# Patient Record
Sex: FEMALE | Race: BLACK OR AFRICAN AMERICAN | NOT HISPANIC OR LATINO
[De-identification: names, ages, dates, MRNs, and addresses within clinical notes are randomized per-mention and may not be internally consistent; named-entity substitution may affect disease eponyms.]

---

## 2020-03-23 ENCOUNTER — APPOINTMENT (OUTPATIENT)
Dept: COLORECTAL SURGERY | Facility: CLINIC | Age: 30
End: 2020-03-23
Payer: COMMERCIAL

## 2020-03-23 VITALS
BODY MASS INDEX: 20.88 KG/M2 | WEIGHT: 133 LBS | HEART RATE: 91 BPM | DIASTOLIC BLOOD PRESSURE: 69 MMHG | TEMPERATURE: 97.7 F | SYSTOLIC BLOOD PRESSURE: 112 MMHG | HEIGHT: 67 IN

## 2020-03-23 PROCEDURE — 99203 OFFICE O/P NEW LOW 30 MIN: CPT

## 2020-03-23 NOTE — PHYSICAL EXAM
[Abdomen Masses] : No abdominal masses [Abdomen Tenderness] : ~T No ~M abdominal tenderness [No HSM] : no hepatosplenomegaly [None] : no anal fissures seen [Tight] : was tight [de-identified] : Superficial one CM ulceration of the posterior margin [de-identified] : Moderate diffuse tenderness. Significant stricture at 6  cm. Patient unable to tolerate further examination

## 2020-03-23 NOTE — ASSESSMENT
[FreeTextEntry1] : I reviewed with the patient with the findings on examination and imaging are consistent with Crohn's disease. I recommend that she proceed and returned to her gastroenterologist for medical therapy. Pending assessment of response to medical therapy further treatment options would include balloon dilatation of the rectal stricture and possible ileal stricture. However the role of further surgical intervention with detail and outlined including but limited to risk benefits and alternatives.

## 2020-03-30 ENCOUNTER — APPOINTMENT (OUTPATIENT)
Dept: COLORECTAL SURGERY | Facility: CLINIC | Age: 30
End: 2020-03-30

## 2021-01-29 ENCOUNTER — APPOINTMENT (OUTPATIENT)
Dept: COLORECTAL SURGERY | Facility: CLINIC | Age: 31
End: 2021-01-29
Payer: COMMERCIAL

## 2021-01-29 VITALS
BODY MASS INDEX: 24.8 KG/M2 | DIASTOLIC BLOOD PRESSURE: 76 MMHG | HEIGHT: 67 IN | TEMPERATURE: 97.4 F | WEIGHT: 158 LBS | HEART RATE: 97 BPM | SYSTOLIC BLOOD PRESSURE: 129 MMHG

## 2021-01-29 DIAGNOSIS — Z82.49 FAMILY HISTORY OF ISCHEMIC HEART DISEASE AND OTHER DISEASES OF THE CIRCULATORY SYSTEM: ICD-10-CM

## 2021-01-29 DIAGNOSIS — Z80.3 FAMILY HISTORY OF MALIGNANT NEOPLASM OF BREAST: ICD-10-CM

## 2021-01-29 DIAGNOSIS — Z83.3 FAMILY HISTORY OF DIABETES MELLITUS: ICD-10-CM

## 2021-01-29 PROCEDURE — 99072 ADDL SUPL MATRL&STAF TM PHE: CPT

## 2021-01-29 PROCEDURE — 99214 OFFICE O/P EST MOD 30 MIN: CPT

## 2021-01-29 NOTE — HISTORY OF PRESENT ILLNESS
[FreeTextEntry1] : 30 y/o F presents for f/u Crohn's disease\par Last seen in the office on 3/23/20 after initial diagnosis. Patient was advised to return to GI and begin medical therapy \par She is currently being followed by Dr. Tonia Joshua and is on Remicaide Q4 weeks\par \par Reports difficulty evacuating her bowls which she discussed with Dr. Prieto. On exam, she was informed her muscles were very tight and diagnosed with possible anal fissure. Never prescribed topical compounds, advised to seek consultation with our office for anal dilatation\par Denies pain, itching, bleeding. Has noticed in the past blood in the bowl when only passing flatus\par BH: Daily\par Admits to straining with BM's and taking up to 5 minutes to evacuate\par Instructed by GI to take Miralax daily. Has been using for 1 month without improvement\par Working on dietary fiber intake, allergic to a number of fruits which limits options. Drinking 4-5 16 oz . bottles of water daily \par \par Colonoscopy completed 2/24/20. To be scheduled for repeat colonoscopy once evaluation of current symptoms completed\par \par MRI Enterography completed within the last 2 months at [facility] in [place]\par \par No ASA/NSAIDs last 7 days

## 2021-01-29 NOTE — ASSESSMENT
[FreeTextEntry1] : I reviewed with the patient that it appears that she has left-sided inflammation on a recent MRI and on examination the suspicion of rectal inflammation and secondary hypertonicity. I have suggested that we trialed a course of 5 ASA suppositories therapy and if symptoms persist we'll plan for exam under anesthesia with anal dilation. At this time I would like to address the acute inflammatory aspect of her chronic Crohn disease prior to attempted dilation in the hopes of achieving a long-term positive outcome.\par \par follow up 4 weeks.\par

## 2021-01-29 NOTE — PHYSICAL EXAM
[Abdomen Masses] : No abdominal masses [Abdomen Tenderness] : ~T No ~M abdominal tenderness [No HSM] : no hepatosplenomegaly [None] : no anal fissures seen [Fistula] : a fistula [1:00] : in the 1:00 position [Tight] : was tight [de-identified] : Moderate diffuse tenderness. able to pass finger on rectal examination- increased tone secondary to pain

## 2021-02-26 ENCOUNTER — APPOINTMENT (OUTPATIENT)
Dept: COLORECTAL SURGERY | Facility: CLINIC | Age: 31
End: 2021-02-26

## 2021-08-19 NOTE — HISTORY OF PRESENT ILLNESS
[FreeTextEntry1] : 30 y/o F presents for f/u Crohn's disease\par She is currently being followed by Dr. Tonia Joshua and is on Remicaide Q4 weeks\par \par Last seen in the office on 1/29/21 with c/o anal tightness and possible fissure. A quiescent closed fistula in the right anterior quadrant at 1 o'clock noted as well as a tight sphincter tone. Patient also noted to have moderate diffuse tenderness and increased tone secondary to pain. She was advised to complete a trial of  Mesalamine suppositories\par \par Colonoscopy completed 2/24/20. To be scheduled for repeat colonoscopy once evaluation of current symptoms completed\par \par MR enterography completed 1/4/21 at [facility]\par - compared to 3/3/20, the inflammatory change of the distal ileum shows improvement. Previously appreciated long ileal segment mild inflammatory change is no longer appreciated. However, there is newly appreciated descending colon inflammatory change\par - no evidence of bowel obstruction, proximal small bowel dilatation, fistula or abscess\par - IUD appears to be upside down in position within the uterine cavity

## 2021-08-20 ENCOUNTER — APPOINTMENT (OUTPATIENT)
Dept: COLORECTAL SURGERY | Facility: CLINIC | Age: 31
End: 2021-08-20

## 2021-10-01 ENCOUNTER — APPOINTMENT (OUTPATIENT)
Dept: COLORECTAL SURGERY | Facility: CLINIC | Age: 31
End: 2021-10-01

## 2021-10-25 ENCOUNTER — APPOINTMENT (OUTPATIENT)
Dept: COLORECTAL SURGERY | Facility: CLINIC | Age: 31
End: 2021-10-25

## 2021-11-12 ENCOUNTER — APPOINTMENT (OUTPATIENT)
Dept: COLORECTAL SURGERY | Facility: CLINIC | Age: 31
End: 2021-11-12
Payer: COMMERCIAL

## 2021-11-12 VITALS
WEIGHT: 159 LBS | HEIGHT: 67 IN | BODY MASS INDEX: 24.96 KG/M2 | HEART RATE: 98 BPM | DIASTOLIC BLOOD PRESSURE: 76 MMHG | SYSTOLIC BLOOD PRESSURE: 111 MMHG | TEMPERATURE: 98.2 F

## 2021-11-12 PROCEDURE — 99215 OFFICE O/P EST HI 40 MIN: CPT

## 2021-11-12 NOTE — ASSESSMENT
[FreeTextEntry1] : I reviewed with the patient her findings on examination are consistent with complex Crohn's disease.  Specifically she has complex perianal disease with perianal skin excoriations edematous tags, and a anal fistula with stricturing of the distal rectum.  In regard to these findings there is no benefit of surgical intervention at this time.  I suggested we proceed with an MRI for further evaluation.\par \par Pending review of the MRI further evaluation including colonoscopy for surveillance of her inflammatory bowel disease would be appropriate.\par \par I have outlined to her that surgical intervention for Crohn's disease would be utilized in settings of complications–abscess/obstruction/stricture not amenable to medical treatment.\par \par Nutritional consultation provided.  Appropriate referrals given.

## 2021-11-12 NOTE — PHYSICAL EXAM
[Abdomen Masses] : No abdominal masses [Abdomen Tenderness] : ~T No ~M abdominal tenderness [No HSM] : no hepatosplenomegaly [None] : no anal fissures seen [Excoriation] : excoriations [Fistula] : a fistula [5:00] : in the 5:00 position [Tight] : was tight [de-identified] : Superficial skin erosions/excoriations extending along the posterior anal margin of the inter  cleft and perianal skin.  Edematous right posterior base external hemorrhoidal tag with small anterior tags [de-identified] : able to pass fifth finger on rectal examination-

## 2021-11-12 NOTE — HISTORY OF PRESENT ILLNESS
[FreeTextEntry1] : 30 y/o F presents Crohn's disease\par She is currently being followed by Dr. Tonia Joshua and is on Remicaide Q4 weeks\par \par Last seen in the office on 1/29/21.  A quiescent, closed right anterior anal fistula opening at right anterior anal fistula opening in the 1 o'clock position. Sphincter tone was tight with moderate diffuse tenderness. Anoscopy deferred due to pain. Advised to begin trial of Mesalamine suppositories. Never started because pharmacy did not have in stock. Spoke to Dr. Prieto who advised patient begin using Metamucil instead\par \par Since last visit had a flare of symptoms when infusion was delayed one week. Back on track with infusions\par C/o persistent intermittent abdominal pains \par Reports small bumps near the anus that she believes may be a skin tag vs a cyst\par Denies anorectal pain or drainage\par Reports occasional BRBPR on the TP \par Denies fever, chills\par C/o occasional nausea and vomiting, last episode occurred last week. States this typically occurs within several days of receiving infusion. Last infusion complete several days prior \par Appetite is adequate, eating several smaller meals daily \par Does not do sitz baths\par BH: 2-3 times daily. Stools are typically formed with decreased caliber, at times can be mushy. Depending on diet can occasionally experience watery diarrhea \par C/o FU with  partial FI that occurs on average every 2 weeks. Typically triggered by diet. Wearing panty liner as a precaution \par Denies mucous with BM's\par Using Metamucil daily \par \par Colonoscopy completed 2/24/20\par - JOSE remarkable for perianal skin tags\par - firm circumferential nodularity in the anus causing stricture. Could NOT be traversed with pediatric colonoscopy, finally traversed with adult gastroscope. Reached to the ascending colon but likely visualizing ICV\par - large ulcer seen at farthest extent reached\par \par To be scheduled for repeat colonoscopy, patient advised to speak to our office in regards when she can proceed 2/2 stricturing.\par Reports blood work shows no change in inflammatory markers \par \par MRI Enterography completed 1/4/21\par - compared to 3/3/20, the inflammatory change of the distal ileum shows improvement. Previously appreciated long ileal segment mild inflammatory change is no longer appreciated. However, there is newly appreciated descending colon inflammatory change\par - no evidence of bowel obstruction, proximal small bowel dilatation, fistula or abscess\par - IUD appear to be upside down in position within the uterine cavity\par \par No ASA/NSAIDs last 7 days

## 2021-12-02 ENCOUNTER — APPOINTMENT (OUTPATIENT)
Dept: MRI IMAGING | Facility: CLINIC | Age: 31
End: 2021-12-02

## 2021-12-22 ENCOUNTER — APPOINTMENT (OUTPATIENT)
Dept: GASTROENTEROLOGY | Facility: CLINIC | Age: 31
End: 2021-12-22

## 2021-12-29 ENCOUNTER — TRANSCRIPTION ENCOUNTER (OUTPATIENT)
Age: 31
End: 2021-12-29

## 2022-01-18 ENCOUNTER — NON-APPOINTMENT (OUTPATIENT)
Age: 32
End: 2022-01-18

## 2022-02-17 ENCOUNTER — APPOINTMENT (OUTPATIENT)
Dept: GASTROENTEROLOGY | Facility: CLINIC | Age: 32
End: 2022-02-17
Payer: COMMERCIAL

## 2022-02-17 VITALS
SYSTOLIC BLOOD PRESSURE: 109 MMHG | WEIGHT: 160 LBS | DIASTOLIC BLOOD PRESSURE: 73 MMHG | BODY MASS INDEX: 25.11 KG/M2 | RESPIRATION RATE: 16 BRPM | HEART RATE: 106 BPM | TEMPERATURE: 96.6 F | HEIGHT: 67 IN | OXYGEN SATURATION: 99 %

## 2022-02-17 PROCEDURE — 99205 OFFICE O/P NEW HI 60 MIN: CPT

## 2022-02-18 NOTE — CONSULT LETTER
[Dear  ___] : Dear  [unfilled], [Consult Letter:] : I had the pleasure of evaluating your patient, [unfilled]. [Please see my note below.] : Please see my note below. [Consult Closing:] : Thank you very much for allowing me to participate in the care of this patient.  If you have any questions, please do not hesitate to contact me. [Sincerely,] : Sincerely, [FreeTextEntry3] : Raciel Wu MD\par Associate Professor of Medicine\par Chief of GI\par Director IBD Program\par [facility]\par

## 2022-02-18 NOTE — ASSESSMENT
[FreeTextEntry1] :  31 y/o F patient with history of Stricturing Crohn disease patient of Dr. Tonia Prieto, currently on Inflectra q4 weeks, sent as referral for evaluation and treatment of IC valve stricture.  Based on her radiologic follow-up it is clear that the Inflectra has been effective and has improved the distal colonic disease but the stricture at the ileum remains unaffected and will likely need mechanical or surgical therapy.\par \par #Stricturing Crohn Disease \par - Although not overtly symptomatic currently she does have intermittent abdominal pain and is food avoidant.\par - Case discussed with multidisciplinary IBD conference- Continue with medical management for now. Can try dilation of anal stenosis, but surgical evaluation suggested easy passage of digit and no evidence of anal stenosis as a cause of outlet obstruction (also agreed based on imaging). Bowel regimen to help with constipation. Group agreed that endoscopic dilation of IVC would be beneficial.  We reviewed the images with the patient.\par - Planned for Plan for Colonoscopy with dilation (with fluoroscopy), use golytely for prep \par - Risks/benefits and alternatives of procedure discussed with the patient and patient agrees to proceed with the procedure. \par - Continue Inflectra q4 weeks \par - Routine pre-procedure labs today \par \par \par RTC after procedure \par \par \par Time spent on preparation of visit immediately before the encounter patient visit and immediate post visit care was 80 minutes activities pertaining to this visit were: Obtaining and/or reviewing separately obtained history performing a medically appropriate examination and/or evaluation as documented in this encounter note, counseling, and education of the patient, family, or caregiver, ordering medications, tests, procedures, referring and communicating with healthcare professionals, documenting clinical information the patient's electronic medical record, interpreting results not separately reported and communicating results to the patient, family, or caregiver and care coordination.As a new patient, additional time was required to  re: options for meds and communicate with referring physician.\par \par

## 2022-02-18 NOTE — PHYSICAL EXAM
[General Appearance - Alert] : alert [General Appearance - In No Acute Distress] : in no acute distress [] : no respiratory distress [Abdomen Soft] : soft [Oriented To Time, Place, And Person] : oriented to person, place, and time [Sclera] : the sclera and conjunctiva were normal [Outer Ear] : the ears and nose were normal in appearance [Neck Appearance] : the appearance of the neck was normal [FreeTextEntry1] : tender in the lower abdomen with an ileal loop felt that had peristalsis with deeper palpation  [Abnormal Walk] : normal gait [Skin Color & Pigmentation] : normal skin color and pigmentation

## 2022-02-18 NOTE — HISTORY OF PRESENT ILLNESS
[Heartburn] : denies heartburn [Vomiting] : denies vomiting [Nausea] : denies nausea [Diarrhea] : denies diarrhea [Yellow Skin Or Eyes (Jaundice)] : denies jaundice [Constipation] : denies constipation [Abdominal Swelling] : denies abdominal swelling [Inflammatory Bowel Disease] : inflammatory bowel disease [Abdominal Pain] : abdominal pain [de-identified] : intermittent rectal bleeding  [FreeTextEntry1] :  33 y/o F patient with history of Stricturing Crohn Diesese, patient of Dr. Tonia Joshua, currently on Inflectra q4 weeks, sent as referral for evaluation and treatment of IC valve stricture. \par \par Crohn Disease \par Patient was diagnosed in 2019, after she had loss of weight accomplainied with intolerance of PO intake, vomiting and abdominal pain. She underwent a colonoscopy and was found to have anal stenosis and IC valve stricutre with ulceration. Initially started on Remicaide q8 weeks, not much symptom improvement and later changed to q4 weeks (Remicaide changed to biosimilar)\par \par Currently she complains of intermittent lower abdominal pain, she has changed her diet over the past year and avoids foods which cause her symptoms. Notices some blood in stools. No nausea, vomiting reported. \par BM 2-3 times/day, consistency varies with diet. \par \par Colonoscopy completed 2/24/20\par - JOSE remarkable for perianal skin tags\par - firm circumferential nodularity in the anus causing stricture. Could NOT be traversed with pediatric colonoscopy, finally traversed with adult gastroscope. Reached to the ascending colon but likely visualizing ICV\par - large ulcer seen at farthest extent reached\par \par MRI 12/2021\par - Stable 3 cm segment terminal ileum with moderate active inflammatory disease including prominent mural hyperenhancement and restricted diffusion. The segment again demonstrated probable stricture (intraluminal narrowing without upstream dilation). \par - Long segment distal colon with ch previously demonstrated active inflammatory disease now appears significantly improved with a 4 cm residual segment demonstrating persistent mild active inflammatory disease \par - NO evidence of penetrating or perianal disease\par - NO extraintestinal sequela of inflammatory bowel disease \par \par \par Case discussed with multidisciplinary IBD conference- \par Continue with medical management for now. Can try dilation of anal stenosis, but surgical evaluation suggested easy passage of digit and no evidence of anal stenosis as a cause of outlet obstruction (also agreed based on imaging). Bowel regimen to help with constipation. Group agreed that endoscopic dilation of IVC would be beneficial. \par \par

## 2022-02-22 LAB
ALBUMIN SERPL ELPH-MCNC: 4 G/DL
ALP BLD-CCNC: 97 U/L
ALT SERPL-CCNC: 15 U/L
ANION GAP SERPL CALC-SCNC: 11 MMOL/L
AST SERPL-CCNC: 16 U/L
BASOPHILS # BLD AUTO: 0.05 K/UL
BASOPHILS NFR BLD AUTO: 0.5 %
BILIRUB SERPL-MCNC: 0.3 MG/DL
BUN SERPL-MCNC: 12 MG/DL
CALCIUM SERPL-MCNC: 9.2 MG/DL
CHLORIDE SERPL-SCNC: 103 MMOL/L
CO2 SERPL-SCNC: 22 MMOL/L
CREAT SERPL-MCNC: 0.88 MG/DL
CRP SERPL-MCNC: 11 MG/L
EOSINOPHIL # BLD AUTO: 0.23 K/UL
EOSINOPHIL NFR BLD AUTO: 2.5 %
GLUCOSE SERPL-MCNC: 108 MG/DL
HCT VFR BLD CALC: 39.2 %
HGB BLD-MCNC: 12.3 G/DL
IMM GRANULOCYTES NFR BLD AUTO: 0.3 %
LYMPHOCYTES # BLD AUTO: 3.6 K/UL
LYMPHOCYTES NFR BLD AUTO: 39.6 %
MAN DIFF?: NORMAL
MCHC RBC-ENTMCNC: 28.5 PG
MCHC RBC-ENTMCNC: 31.4 GM/DL
MCV RBC AUTO: 90.7 FL
MONOCYTES # BLD AUTO: 1.1 K/UL
MONOCYTES NFR BLD AUTO: 12.1 %
NEUTROPHILS # BLD AUTO: 4.09 K/UL
NEUTROPHILS NFR BLD AUTO: 45 %
PLATELET # BLD AUTO: 395 K/UL
POTASSIUM SERPL-SCNC: 4.5 MMOL/L
PROT SERPL-MCNC: 8.2 G/DL
RBC # BLD: 4.32 M/UL
RBC # FLD: 12.9 %
SODIUM SERPL-SCNC: 137 MMOL/L
WBC # FLD AUTO: 9.1 K/UL

## 2022-03-15 ENCOUNTER — NON-APPOINTMENT (OUTPATIENT)
Age: 32
End: 2022-03-15

## 2022-03-22 ENCOUNTER — APPOINTMENT (OUTPATIENT)
Dept: GASTROENTEROLOGY | Facility: HOSPITAL | Age: 32
End: 2022-03-22

## 2022-04-07 ENCOUNTER — APPOINTMENT (OUTPATIENT)
Dept: GASTROENTEROLOGY | Facility: CLINIC | Age: 32
End: 2022-04-07

## 2022-05-03 ENCOUNTER — APPOINTMENT (OUTPATIENT)
Dept: GASTROENTEROLOGY | Facility: HOSPITAL | Age: 32
End: 2022-05-03

## 2022-07-05 ENCOUNTER — RESULT REVIEW (OUTPATIENT)
Age: 32
End: 2022-07-05

## 2022-07-05 ENCOUNTER — APPOINTMENT (OUTPATIENT)
Dept: GASTROENTEROLOGY | Facility: HOSPITAL | Age: 32
End: 2022-07-05

## 2022-07-05 ENCOUNTER — TRANSCRIPTION ENCOUNTER (OUTPATIENT)
Age: 32
End: 2022-07-05

## 2022-07-05 ENCOUNTER — OUTPATIENT (OUTPATIENT)
Dept: OUTPATIENT SERVICES | Facility: HOSPITAL | Age: 32
LOS: 1 days | Discharge: HOME | End: 2022-07-05
Payer: COMMERCIAL

## 2022-07-05 PROCEDURE — 45386 COLONOSCOPY W/BALLOON DILAT: CPT

## 2022-07-05 PROCEDURE — 45380 COLONOSCOPY AND BIOPSY: CPT

## 2022-07-05 PROCEDURE — 88305 TISSUE EXAM BY PATHOLOGIST: CPT | Mod: 26

## 2022-07-05 PROCEDURE — C1726: CPT

## 2022-07-05 PROCEDURE — 88305 TISSUE EXAM BY PATHOLOGIST: CPT

## 2022-07-05 DEVICE — CATH BLLN CRE .035INX7.7FR 10-11-12MM: Type: IMPLANTABLE DEVICE | Status: FUNCTIONAL

## 2022-07-05 DEVICE — CATHETER ESPH PYL CLN BIL .035: Type: IMPLANTABLE DEVICE | Status: FUNCTIONAL

## 2022-07-06 LAB — SURGICAL PATHOLOGY STUDY: SIGNIFICANT CHANGE UP

## 2022-07-13 ENCOUNTER — APPOINTMENT (OUTPATIENT)
Dept: GASTROENTEROLOGY | Facility: CLINIC | Age: 32
End: 2022-07-13

## 2022-07-13 PROCEDURE — 99215 OFFICE O/P EST HI 40 MIN: CPT | Mod: 95

## 2022-07-13 NOTE — PHYSICAL EXAM
[General Appearance - Alert] : alert [General Appearance - In No Acute Distress] : in no acute distress [General Appearance - Well Nourished] : well nourished [General Appearance - Well Developed] : well developed [General Appearance - Well-Appearing] : healthy appearing [Sclera] : the sclera and conjunctiva were normal [Hearing Threshold Finger Rub Not [place]] : hearing was normal [Neck Appearance] : the appearance of the neck was normal [Exaggerated Use Of Accessory Muscles For Inspiration] : no accessory muscle use [] : no rash [No Focal Deficits] : no focal deficits [Oriented To Time, Place, And Person] : oriented to person, place, and time [Impaired Insight] : insight and judgment were intact [Affect] : the affect was normal

## 2022-07-15 NOTE — HISTORY OF PRESENT ILLNESS
[Home] : at home, [unfilled] , at the time of the visit. [Medical Office: ([facility])___] : at the medical office located in  [Verbal consent obtained from patient] : the patient, [unfilled] [de-identified] : 31 y/o F patient with history of Stricturing Crohn Disease, patient of Dr. Tonia Joshua, currently on Inflectra q4 weeks, sent as referral for evaluation and treatment of IC valve stricture. \par \par Since her last visit to the clinic, patient had a colonoscopy with Dr Wu on 7/5/22. Results significant for anorectal stricture, s/p digital dilatation and ICV stricture, s/p dilatation 8-12 mm, still unable to pass through with the PCF scope. Notes feeling no change/improvement as far as her pain since her dilatation. Notes that she occasionally has a hard time passing stool and noted some blood with straining approximately 2 weeks ago. Notes her RLQ cramping abdominal pain persists, occurring approximately 2x/week. \par \par ROS otherwise negative for nausea/vomiting, inability to tolerate PO, weight loss. Remainder of ROS negative. \par \par Colonoscopy (7/5/22): Digital dilation was performed to be able pass PCF beyond anorectal stricture. The rest of the colon had diffuse apthae/exudate affecting >50% of surface area. The ICV was deformed and ulcerated and had only a pinhole opening. Under fluoroscopy guidance, a guidewire was passed and the stricture was dilated incrementally form 8 - 12mm. The stricture was still did not allow passage of colonoscope. (Sigmoid colon pathology --> colonic mucosa showing mild to moderate, focally active chronic inflammation. No dysplasia noted) \par \par ____________________________________________________________________________\par Crohn Disease \par Patient was diagnosed in 2019, after she had loss of weight accompanied with intolerance of PO intake, vomiting and abdominal pain. She underwent a colonoscopy and was found to have anal stenosis and IC valve stricture with ulceration. Initially started on Remicade q8 weeks, not much symptom improvement and later changed to q4 weeks (Remicade changed to biosimilar)\par \par Currently she complains of intermittent lower abdominal pain, she has changed her diet over the past year and avoids foods which cause her symptoms. Notices some blood in stools. No nausea, vomiting reported. \par BM 2-3 times/day, consistency varies with diet. \par \par Colonoscopy completed 2/24/20\par - JOSE remarkable for perianal skin tags\par - firm circumferential nodularity in the anus causing stricture. Could NOT be traversed with pediatric colonoscopy, finally traversed with adult gastroscope. Reached to the ascending colon but likely visualizing ICV\par - large ulcer seen at farthest extent reached\par \par MRI 12/2021\par - Stable 3 cm segment terminal ileum with moderate active inflammatory disease including prominent mural hyperenhancement and restricted diffusion. The segment again demonstrated probable stricture (intraluminal narrowing without upstream dilation). \par - Long segment distal colon with ch previously demonstrated active inflammatory disease now appears significantly improved with a 4 cm residual segment demonstrating persistent mild active inflammatory disease \par - NO evidence of penetrating or perianal disease\par - NO extraintestinal sequela of inflammatory bowel disease \par \par Case discussed with multidisciplinary IBD conference- \par Continue with medical management for now. Can try dilation of anal stenosis, but surgical evaluation suggested easy passage of digit and no evidence of anal stenosis as a cause of outlet obstruction (also agreed based on imaging). Bowel regimen to help with constipation. Group agreed that endoscopic dilation of IVC would be beneficial. \par

## 2022-07-15 NOTE — CONSULT LETTER
[DrArmando  ___] : Dr. RICKETTS [Dear  ___] : Dear  [unfilled], [Courtesy Letter:] : I had the pleasure of seeing your patient, [unfilled], in my office today. [Please see my note below.] : Please see my note below. [Sincerely,] : Sincerely, [FreeTextEntry3] : Raciel Wu MD\par Associate Professor of Medicine\par Chief of GI\par Director IBD Program\par [facility]\par

## 2022-07-15 NOTE — REVIEW OF SYSTEMS
[As Noted in HPI] : as noted in HPI [Abdominal Pain] : abdominal pain [Constipation] : constipation [Negative] : Heme/Lymph [FreeTextEntry7] : occasional rectal bleeding, RLQ pain

## 2022-07-15 NOTE — ASSESSMENT
[FreeTextEntry1] : 31 y/o F patient with history of Stricturing Crohn Disease, patient of Dr. Tonia Joshua, currently on Inflectra q4 weeks, sent as referral for evaluation and treatment of IC valve stricture. \par \par #Stricturing Crohn's disease \par With stricturing CD, diagnosed 2020, with ICV stricture & anorectal stenosis. MRI (12/2021) demonstrating 2.9 cm segment of TI with moderate wall thickening and moderate mural hyperenhancement with no upstream dilatation. No e/o fistulizing disease. With previous exposure to Remicade, since switched to biosimilar agent, Inflectra, q4 weeks, last infusion 6/28/22 with some clinical improvement on medication however still with persistent stricture @ ICV, unable to traverse s/p dilatation with PCF. \par - Scheduled for upcoming infusion 7/29, will plan to obtain Infliximab drug level/ab prior to planned infusion \par - Following infusion/pending BW results, will discuss with Dr Prieto regarding Inflectra dosing and need for adjustment of current regimen, including DUET study given persistent ongoing inflammation\par - Anorectal stenosis  resulting in complaints of "constipation", and seemingly limited benefit with repeat dilation.  WOuld benefit from more control of inflammation vs, possible temporary diversion. \par \par Dw Dr Prieto and will have f/u dr. Talamantes. \par \par RTC in 2 weeks post next appt 7/29\par \par Prema Beaver, \par Gastroenterology Fellow\par \par Medical decision making level was high given chronic illness posing threat to bodily function, and I personally reviewed colonoscopy and pathology and discussed findings with her referring provider, and have arranged for follow up of drug therapy monitoring and identified factors that may be relevant to elective surgery.\par \par

## 2022-08-10 LAB
INFLIXIMAB AB SERPL-MCNC: 38 NG/ML
INFLIXIMAB SERPL-MCNC: 11 UG/ML

## 2022-08-24 ENCOUNTER — APPOINTMENT (OUTPATIENT)
Dept: GASTROENTEROLOGY | Facility: CLINIC | Age: 32
End: 2022-08-24

## 2022-08-24 PROCEDURE — 99214 OFFICE O/P EST MOD 30 MIN: CPT | Mod: 95

## 2022-08-29 NOTE — CONSULT LETTER
[Dear  ___] : Dear  [unfilled], [Consult Letter:] : I had the pleasure of evaluating your patient, [unfilled]. [Please see my note below.] : Please see my note below. [Consult Closing:] : Thank you very much for allowing me to participate in the care of this patient.  If you have any questions, please do not hesitate to contact me. [Sincerely,] : Sincerely, [FreeTextEntry3] : Raciel Wu MD\par Associate Professor of Medicine\par Chief of GI\par Director IBD Program\par [facility]\par  [DrArmando  ___] : Dr. RICKETTS

## 2022-08-29 NOTE — HISTORY OF PRESENT ILLNESS
[Home] : at home, [unfilled] , at the time of the visit. [Medical Office: ([facility])___] : at the medical office located in  [Verbal consent obtained from patient] : the patient, [unfilled] [de-identified] : 33 y/o F patient with history of Stricturing Crohn Disease, patient of Dr. Tonia Joshua, currently on Inflectra q4 weeks, sent as referral for evaluation and treatment of IC valve stricture. Presenting today for telemedicine follow up.\par \par - Feeling well today\par - Overall feeling fatigued branden after work\par - Abdominal pain subsided, manageable with Tylenol\par - 1 BM/ day, thin, with straining with brb. 1 x/ day feels urge to defecate but unable to do so\par - Does not feel dilation improved symptoms\par - Last dose inflectra 8/12/22, gets q4 weeks, feelis it helps 90% with symptom but symptoms start to return the week before infusion\par - Requests a nutritionist referral\par - Trying to set up appt with Dr. Talamantes but difficult to take day off for work\par \par Prior visit 7/13/22:\par Since her last visit to the clinic, patient had a colonoscopy with Dr Wu on 7/5/22. Results significant for anorectal stricture, s/p digital dilatation and ICV stricture, s/p dilatation 8-12 mm, still unable to pass through with the PCF scope. Notes feeling no change/improvement as far as her pain since her dilatation. Notes that she occasionally has a hard time passing stool and noted some blood with straining approximately 2 weeks ago. Notes her RLQ cramping abdominal pain persists, occurring approximately 2x/week. \par \par ROS otherwise negative for nausea/vomiting, inability to tolerate PO, weight loss. Remainder of ROS negative. \par \par Colonoscopy (7/5/22): Digital dilation was performed to be able pass PCF beyond anorectal stricture. The rest of the colon had diffuse apthae/exudate affecting >50% of surface area. The ICV was deformed and ulcerated and had only a pinhole opening. Under fluoroscopy guidance, a guidewire was passed and the stricture was dilated incrementally form 8 - 12mm. The stricture was still did not allow passage of colonoscope. (Sigmoid colon pathology --> colonic mucosa showing mild to moderate, focally active chronic inflammation. No dysplasia noted) \par \par ____________________________________________________________________________\par Crohn Disease \par Patient was diagnosed in 2019, after she had loss of weight accompanied with intolerance of PO intake, vomiting and abdominal pain. She underwent a colonoscopy and was found to have anal stenosis and IC valve stricture with ulceration. Initially started on Remicade q8 weeks, not much symptom improvement and later changed to q4 weeks (Remicade changed to biosimilar)\par \par Currently she complains of intermittent lower abdominal pain, she has changed her diet over the past year and avoids foods which cause her symptoms. Notices some blood in stools. No nausea, vomiting reported. \par BM 2-3 times/day, consistency varies with diet. \par \par Colonoscopy completed 2/24/20\par - JOSE remarkable for perianal skin tags\par - firm circumferential nodularity in the anus causing stricture. Could NOT be traversed with pediatric colonoscopy, finally traversed with adult gastroscope. Reached to the ascending colon but likely visualizing ICV\par - large ulcer seen at farthest extent reached\par \par MRI 12/2021\par - Stable 3 cm segment terminal ileum with moderate active inflammatory disease including prominent mural hyperenhancement and restricted diffusion. The segment again demonstrated probable stricture (intraluminal narrowing without upstream dilation). \par - Long segment distal colon with ch previously demonstrated active inflammatory disease now appears significantly improved with a 4 cm residual segment demonstrating persistent mild active inflammatory disease \par - NO evidence of penetrating or perianal disease\par - NO extraintestinal sequela of inflammatory bowel disease \par \par Case discussed with multidisciplinary IBD conference- \par Continue with medical management for now. Can try dilation of anal stenosis, but surgical evaluation suggested easy passage of digit and no evidence of anal stenosis as a cause of outlet obstruction (also agreed based on imaging). Bowel regimen to help with constipation. Group agreed that endoscopic dilation of IVC would be beneficial. \par

## 2022-08-29 NOTE — ASSESSMENT
[FreeTextEntry1] : 31 y/o F patient with history of Stricturing Crohn Disease, patient of Dr. Tonia Joshua, currently on Inflectra 10mg/kg q4 weeks, sent as referral for evaluation and treatment of IC valve stricture. \par \par #Stricturing Crohn's disease \par With stricturing CD, diagnosed 2020, with ICV stricture & anorectal stenosis. MRI (12/2021) demonstrating 2.9 cm segment of TI with moderate wall thickening and moderate mural hyperenhancement with no upstream dilatation. No e/o fistulizing disease. With previous exposure to Remicade, since switched to biosimilar agent, Inflectra, q4 weeks, last infusion 8/12/22 with significant clinical improvement on medication however still with persistent stricture @ ICV, unable to traverse s/p dilatation with PCF. Symptoms appear to improve after infusion and wear off after week three. \par - Anorectal stenosis resulting in complaints of thin stools with rectal bleeding, and seemingly limited benefit with repeat dilation.  Would benefit from more control of inflammation vs, possible temporary diversion. \par - Drug level 7/29: 11, Antibody 38, - given early Ab will plan to obtain Infliximab drug level/ab prior to next planned infusion \par - Will plan to start Azathioprine 50mg/d to current regime and monitor ab/ level q4w, will obtain TPMT first\par - Can later consider increasing dose ifx biosim from 10 to 15mg/kg\par - Plan discussed with Dr Prieto regarding adjustment of current regimen\par - Recommend follow up with Dr. Talamantes for consideration of diversion (but only if we can't get control with higher dose of ifx biosim)\par \par Pt to f/u Dr Prieto and dr. Talamantes. \par \par F/u telemedicine visit following lab work\par \par Minal Howard MD\par GI Fellow \par

## 2022-09-19 LAB
TPMT ENZYME INTERPRETATION: NORMAL
TPMT ENZYME METHODOLOGY: NORMAL
TPMT ENZYME: 14.6

## 2022-09-20 LAB
ANTIBODIES TO INFLIXIMAB (ATI) CONCENRTRATION: < 3.1 U/ML
PROMETHEUS ANSER IFX: NORMAL
PROMETHEUS LABORATORY FOOTER: NORMAL
SERUM INFLIXIMAB (IFX) CONCENTRATION: > 34 UG/ML

## 2022-09-29 ENCOUNTER — APPOINTMENT (OUTPATIENT)
Dept: GASTROENTEROLOGY | Facility: CLINIC | Age: 32
End: 2022-09-29

## 2022-09-29 PROCEDURE — 99214 OFFICE O/P EST MOD 30 MIN: CPT | Mod: 95

## 2022-09-30 NOTE — CONSULT LETTER
[Dear  ___] : Dear  [unfilled], [Consult Letter:] : I had the pleasure of evaluating your patient, [unfilled]. [Please see my note below.] : Please see my note below. [Consult Closing:] : Thank you very much for allowing me to participate in the care of this patient.  If you have any questions, please do not hesitate to contact me. [Sincerely,] : Sincerely, [FreeTextEntry3] : Raciel Wu MD\par Associate Professor of Medicine\par Chief of GI\par Director IBD Program\par [facility]\par  [DrArmando  ___] : Dr. RICKETTS

## 2022-09-30 NOTE — HISTORY OF PRESENT ILLNESS
[de-identified] : 33 y/o F patient with history of Stricturing Crohn Disease, patient of Dr. Tonia Joshua, currently on Inflectra q4 weeks.  Presenting today for telemedicine follow up.\par \par - Feeling well today\par Remarkably better since her last visit and generally is asymptomatic between every 4 week infusions.  We had been planning to start an immunomodulators as her last drug level monitoring suggested the development of antibodies against Renflexis but a repeat study with the Prometheus assay does not show any antibodies and a higher level of in Inflectra at 34 mcg/mL.\par \par - Last dose inflectra 8/12/22, gets q4 weeks, \par - Requests a nutritionist referral\par \par Prior visit 7/13/22:\par Since her last visit to the clinic, patient had a colonoscopy with Dr Wu on 7/5/22. Results significant for anorectal stricture, s/p digital dilatation and ICV stricture, s/p dilatation 8-12 mm, still unable to pass through with the PCF scope. Notes feeling no change/improvement as far as her pain since her dilatation. Notes that she occasionally has a hard time passing stool and noted some blood with straining approximately 2 weeks ago. Notes her RLQ cramping abdominal pain persists, occurring approximately 2x/week. \par \par ROS otherwise negative for nausea/vomiting, inability to tolerate PO, weight loss. Remainder of ROS negative. \par \par Colonoscopy (7/5/22): Digital dilation was performed to be able pass PCF beyond anorectal stricture. The rest of the colon had diffuse apthae/exudate affecting >50% of surface area. The ICV was deformed and ulcerated and had only a pinhole opening. Under fluoroscopy guidance, a guidewire was passed and the stricture was dilated incrementally form 8 - 12mm. The stricture was still did not allow passage of colonoscope. (Sigmoid colon pathology --> colonic mucosa showing mild to moderate, focally active chronic inflammation. No dysplasia noted) \par \par ____________________________________________________________________________\par Crohn Disease \par Patient was diagnosed in 2019, after she had loss of weight accompanied with intolerance of PO intake, vomiting and abdominal pain. She underwent a colonoscopy and was found to have anal stenosis and IC valve stricture with ulceration. Initially started on Remicade q8 weeks, not much symptom improvement and later changed to q4 weeks (Remicade changed to biosimilar)\par \par Currently she complains of intermittent lower abdominal pain, she has changed her diet over the past year and avoids foods which cause her symptoms. Notices some blood in stools. No nausea, vomiting reported. \par BM 2-3 times/day, consistency varies with diet. \par \par Colonoscopy completed 2/24/20\par - JOSE remarkable for perianal skin tags\par - firm circumferential nodularity in the anus causing stricture. Could NOT be traversed with pediatric colonoscopy, finally traversed with adult gastroscope. Reached to the ascending colon but likely visualizing ICV\par - large ulcer seen at farthest extent reached\par \par MRI 12/2021\par - Stable 3 cm segment terminal ileum with moderate active inflammatory disease including prominent mural hyperenhancement and restricted diffusion. The segment again demonstrated probable stricture (intraluminal narrowing without upstream dilation). \par - Long segment distal colon with ch previously demonstrated active inflammatory disease now appears significantly improved with a 4 cm residual segment demonstrating persistent mild active inflammatory disease \par - NO evidence of penetrating or perianal disease\par - NO extraintestinal sequela of inflammatory bowel disease \par \par Case discussed with multidisciplinary IBD conference- \par Continue with medical management for now. Can try dilation of anal stenosis, but surgical evaluation suggested easy passage of digit and no evidence of anal stenosis as a cause of outlet obstruction (also agreed based on imaging). Bowel regimen to help with constipation. Group agreed that endoscopic dilation of IVC would be beneficial. \par  [Home] : at home, [unfilled] , at the time of the visit. [Medical Office: ([facility])___] : at the medical office located in  [Verbal consent obtained from patient] : the patient, [unfilled]

## 2022-09-30 NOTE — ASSESSMENT
[FreeTextEntry1] : 31 y/o F patient with history of Stricturing Crohn Disease, patient of Dr. Tonia Joshua, currently on Inflectra 10mg/kg q4 weeks, overall feels significantly better with excellent trough drug levels at 34.\par \par #Stricturing Crohn's disease \par With stricturing CD, diagnosed 2020, with ICV stricture & anorectal stenosis. MRI (12/2021) demonstrating 2.9 cm segment of TI with moderate wall thickening and moderate mural hyperenhancement with no upstream dilatation. No e/o fistulizing disease. With previous exposure to Remicade, since switched to biosimilar agent, Inflectra, q4 weeks, last infusion 8/12/22 with significant clinical improvement on medication however still with persistent stricture @ ICV, unable to traverse s/p dilatation with PCF. \par -Plan for repeat colonoscopy and plan for dilation up to 15 mm, with reassessment of overall disease burden which was quite high at last evaluation\par -Given her improvement in symptoms she will hold off visit with Dr. Talamantes as the need for diversion seems to be less now.\par - Drug level 7/29: 11, Antibody 38, - given early Ab will plan to obtain Infliximab drug level/ab prior to next planned infusion \par -She has intermediate to normal level of TPMT but no plans to start immunomodulators given her drug levels are therapeutic without evidence of antibodies to Inflectra\par - Plan discussed with Dr Prieto regarding adjustment of current regimen\par \par \par \par \par Follow-up after procedure\par \par Minal Howard MD\par GI Fellow \par

## 2022-09-30 NOTE — REASON FOR VISIT
[Home] : at home, [unfilled] , at the time of the visit. [Medical Office: ([facility])___] : at the medical office located in  [Patient] : the patient [Self] : self [Follow-Up: _____] : a [unfilled] follow-up visit

## 2022-10-07 ENCOUNTER — APPOINTMENT (OUTPATIENT)
Dept: COLORECTAL SURGERY | Facility: CLINIC | Age: 32
End: 2022-10-07

## 2022-11-22 ENCOUNTER — APPOINTMENT (OUTPATIENT)
Dept: GASTROENTEROLOGY | Facility: HOSPITAL | Age: 32
End: 2022-11-22

## 2023-01-28 ENCOUNTER — LABORATORY RESULT (OUTPATIENT)
Age: 33
End: 2023-01-28

## 2023-01-31 ENCOUNTER — APPOINTMENT (OUTPATIENT)
Dept: GASTROENTEROLOGY | Facility: HOSPITAL | Age: 33
End: 2023-01-31

## 2023-01-31 ENCOUNTER — OUTPATIENT (OUTPATIENT)
Dept: OUTPATIENT SERVICES | Facility: HOSPITAL | Age: 33
LOS: 1 days | Discharge: HOME | End: 2023-01-31
Payer: COMMERCIAL

## 2023-01-31 VITALS — WEIGHT: 160.94 LBS | HEIGHT: 67 IN

## 2023-01-31 PROCEDURE — 45378 DIAGNOSTIC COLONOSCOPY: CPT | Mod: 52

## 2023-01-31 PROCEDURE — C1726: CPT

## 2023-01-31 PROCEDURE — 45386 COLONOSCOPY W/BALLOON DILAT: CPT | Mod: 53

## 2023-01-31 DEVICE — CATH BLLN CRE .035INX7.7FR 10-11-12MM: Type: IMPLANTABLE DEVICE | Status: FUNCTIONAL

## 2023-01-31 NOTE — PRE-ANESTHESIA EVALUATION ADULT - NSANTHAIRWAYFT_ENT_ALL_CORE
Patient presents to [facility] with complaints of   cough and sore throat since last night.    Medications verified, no changes.    Denies known Latex allergy or symptoms of Latex sensitivity.    Patient would like communication of their results via:      Home Phone: [phone] (home)  Okay to leave a message containing results? Yes   neck full rangeof motion, >3FB

## 2023-02-15 ENCOUNTER — APPOINTMENT (OUTPATIENT)
Dept: GASTROENTEROLOGY | Facility: CLINIC | Age: 33
End: 2023-02-15
Payer: COMMERCIAL

## 2023-02-15 PROCEDURE — 99214 OFFICE O/P EST MOD 30 MIN: CPT | Mod: 95

## 2023-02-15 NOTE — REASON FOR VISIT
[Home] : at home, [unfilled] , at the time of the visit. [Medical Office: ([facility])___] : at the medical office located in  [Patient] : the patient [Self] : self [Follow-Up: _____] : a [unfilled] follow-up visit

## 2023-02-19 NOTE — ASSESSMENT
[FreeTextEntry1] : 32 y/o F patient with history of Stricturing Crohn Disease, patient of Dr. Tonia Joshua, currently on Inflectra 10mg/kg q4 weeks, overall feels significantly better with excellent trough drug levels at 34 (checked Sept 2022, pt receiving at another infusions center). \par \par #Stricturing Crohn's disease \par - With stricturing CD, diagnosed 2020, with ICV stricture & anorectal stenosis. MRI (12/2021) demonstrating 2.9 cm segment of TI with moderate wall thickening and moderate mural hyperenhancement with no upstream dilatation. No e/o fistulizing disease. With previous exposure to Remicade, since switched to biosimilar agent, Inflectra, q4 weeks, w/ significant clinical improvement on medication however still with persistent stricture @ ICV, unable to traverse s/p dilatation with PCF. \par - repeat cscope successfully dilated anal stricture, however due to poor prep unable to satisfactorily clear in order to perform ICV dilation; will plan for golytely prep and repeat dilation after MRE as last MRE in 2021 \par - start at home anal dilation\par - Given her improvement in symptoms she will hold off visit with Dr. Talamantes as the need for diversion seems to be less now.\par - She has intermediate to normal level of TPMT but no plans to start immunomodulators given her drug levels are therapeutic without evidence of antibodies to Inflectra in Sept - can check again this year \par \par F/U post-MRE

## 2023-02-19 NOTE — CONSULT LETTER
[Dear  ___] : Dear  [unfilled], [Consult Letter:] : I had the pleasure of evaluating your patient, [unfilled]. [Please see my note below.] : Please see my note below. [Consult Closing:] : Thank you very much for allowing me to participate in the care of this patient.  If you have any questions, please do not hesitate to contact me. [Sincerely,] : Sincerely, [DrArmando  ___] : Dr. RIKCETTS [FreeTextEntry3] : Raciel Wu MD\par Associate Professor of Medicine\par Chief of GI\par Director IBD Program\par [facility]\par

## 2023-02-19 NOTE — HISTORY OF PRESENT ILLNESS
[Home] : at home, [unfilled] , at the time of the visit. [Medical Office: ([facility])___] : at the medical office located in  [Verbal consent obtained from patient] : the patient, [unfilled] [de-identified] : 34 y/o F patient with history of Stricturing Crohn Disease, patient of Dr. Tonia Prieto, currently on Inflectra q4 weeks.  Presenting today for telemedicine follow up after recent cscope. \par \par CSCOPE: A very tight anal stricture did not initially allow passage of adult \par colonoscope. A gastroscope was used to bypass the anal stricture into the rectum \par and then a colonoscope was used to do the same with some resistance at the anus. \par The colon was full of stool that could not be satisfactorily cleared. The \par visible mucosa did not have evidence of ulceration or inflammation to the level \par of the transverse colon. The anal canal was dilated using TTE 10-12mm upon w/d. \par \par Remarkably better since her last visit and generally is asymptomatic between every 4 week infusions.  We had been planning to start an immunomodulators as her last drug level monitoring suggested the development of antibodies against Renflexis but a repeat study with the Prometheus assay does not show any antibodies and a higher level of in Inflectra. \par Pt reports less pain after cscope as well. \par \par Prior visit 7/13/22:\par Since her last visit to the clinic, patient had a colonoscopy with Dr Wu on 7/5/22. Results significant for anorectal stricture, s/p digital dilatation and ICV stricture, s/p dilatation 8-12 mm, still unable to pass through with the PCF scope. Notes feeling no change/improvement as far as her pain since her dilatation. Notes that she occasionally has a hard time passing stool and noted some blood with straining approximately 2 weeks ago. Notes her RLQ cramping abdominal pain persists, occurring approximately 2x/week. \par \par ROS otherwise negative for nausea/vomiting, inability to tolerate PO, weight loss. Remainder of ROS negative. \par \par Colonoscopy (7/5/22): Digital dilation was performed to be able pass PCF beyond anorectal stricture. The rest of the colon had diffuse apthae/exudate affecting >50% of surface area. The ICV was deformed and ulcerated and had only a pinhole opening. Under fluoroscopy guidance, a guidewire was passed and the stricture was dilated incrementally form 8 - 12mm. The stricture was still did not allow passage of colonoscope. (Sigmoid colon pathology --> colonic mucosa showing mild to moderate, focally active chronic inflammation. No dysplasia noted) \par \par ____________________________________________________________________________\par Crohn Disease \par Patient was diagnosed in 2019, after she had loss of weight accompanied with intolerance of PO intake, vomiting and abdominal pain. She underwent a colonoscopy and was found to have anal stenosis and IC valve stricture with ulceration. Initially started on Remicade q8 weeks, not much symptom improvement and later changed to q4 weeks (Remicade changed to biosimilar)\par \par Currently she complains of intermittent lower abdominal pain, she has changed her diet over the past year and avoids foods which cause her symptoms. Notices some blood in stools. No nausea, vomiting reported. \par BM 2-3 times/day, consistency varies with diet. \par \par Colonoscopy completed 2/24/20\par - JOSE remarkable for perianal skin tags\par - firm circumferential nodularity in the anus causing stricture. Could NOT be traversed with pediatric colonoscopy, finally traversed with adult gastroscope. Reached to the ascending colon but likely visualizing ICV\par - large ulcer seen at farthest extent reached\par \par MRI 12/2021\par - Stable 3 cm segment terminal ileum with moderate active inflammatory disease including prominent mural hyperenhancement and restricted diffusion. The segment again demonstrated probable stricture (intraluminal narrowing without upstream dilation). \par - Long segment distal colon with ch previously demonstrated active inflammatory disease now appears significantly improved with a 4 cm residual segment demonstrating persistent mild active inflammatory disease \par - NO evidence of penetrating or perianal disease\par - NO extraintestinal sequela of inflammatory bowel disease \par \par Case discussed with multidisciplinary IBD conference- \par Continue with medical management for now. Can try dilation of anal stenosis, but surgical evaluation suggested easy passage of digit and no evidence of anal stenosis as a cause of outlet obstruction (also agreed based on imaging). Bowel regimen to help with constipation. Group agreed that endoscopic dilation of IVC would be beneficial. \par

## 2023-02-22 ENCOUNTER — RESULT REVIEW (OUTPATIENT)
Age: 33
End: 2023-02-22

## 2023-04-15 ENCOUNTER — APPOINTMENT (OUTPATIENT)
Dept: MRI IMAGING | Facility: HOSPITAL | Age: 33
End: 2023-04-15

## 2023-05-05 ENCOUNTER — APPOINTMENT (OUTPATIENT)
Dept: MRI IMAGING | Facility: CLINIC | Age: 33
End: 2023-05-05

## 2023-05-05 ENCOUNTER — OUTPATIENT (OUTPATIENT)
Dept: OUTPATIENT SERVICES | Facility: HOSPITAL | Age: 33
LOS: 1 days | End: 2023-05-05
Payer: COMMERCIAL

## 2023-05-05 PROCEDURE — 72197 MRI PELVIS W/O & W/DYE: CPT

## 2023-05-05 PROCEDURE — 72197 MRI PELVIS W/O & W/DYE: CPT | Mod: 26

## 2023-05-05 PROCEDURE — 74183 MRI ABD W/O CNTR FLWD CNTR: CPT

## 2023-05-05 PROCEDURE — 74183 MRI ABD W/O CNTR FLWD CNTR: CPT | Mod: 26

## 2023-05-24 ENCOUNTER — APPOINTMENT (OUTPATIENT)
Dept: GASTROENTEROLOGY | Facility: CLINIC | Age: 33
End: 2023-05-24
Payer: COMMERCIAL

## 2023-05-24 PROCEDURE — 99213 OFFICE O/P EST LOW 20 MIN: CPT | Mod: 95

## 2023-05-24 NOTE — REASON FOR VISIT
[Home] : at home, [unfilled] , at the time of the visit. [Medical Office: ([facility])___] : at the medical office located in  [Patient] : the patient [Self] : self [This encounter was initiated by telehealth (audio with video) and converted to telephone (audio only) due to technical difficulties.] : This encounter was initiated by telehealth (audio with video) and converted to telephone (audio only) due to technical difficulties.

## 2023-05-25 NOTE — ASSESSMENT
[FreeTextEntry1] : 32 y/o F with history of Stricturing CD dx 2020, patient of Dr. Tonia Prieto, currently on Inflectra q4 weeks, presenting today for telemedicine s/p recent MRE. 5/3/23 MRE with involvement of 3.5cm segment of TI at the ICV with luminal narrowing (stable from prior studies) and resolved previously visualized colonic involvement. Last scope 1/2023 with anal stricture dilated to 10-12mm and limited mucosal evaluation due to inadequate prep. Pt has been using home dilator kit but reports that she has not seen much benefit from it (unclear which size was biggest she tried). Shes been experiencing constipation with formed to hard stool, generally every other day now, and is wary of straining to avoid bleeding.\par \par # Stricturing Crohn's disease; in clinical remission\par - clinical status, MRE without colonic involvement and stable TI segment, and healed visualized mucosa on last scope suggestive of good disease control on current regimen\par - unclear if recent constipation is due to tightening again of anal stricture vs true constipation.  She is too anxious to inc the size of her dilator.\par - discussed option of CRS referral for anal stricture dilation followed by colonoscopy to eval ICV vs daily miralax and Golytely prep and reassess anal stricture at time of colonoscopy; pt prefers Miralax and colonoscopy eval. 2 day prep given prior poor prep.\par - She has intermediate to normal level of TPMT but no plans to start immunomodulators given her drug levels are therapeutic without evidence of antibodies to Inflectra in Sept - can check again later this year \par - noted past consideration to see Dr. Talamantes for evaluation for diversion\par - todays evaluation and recs shared with Dr Prieto\par \par F/U post procedure\par \par \par Edson Kincaid DO\par Krunal Acevedo IBD Fellow

## 2023-05-25 NOTE — HISTORY OF PRESENT ILLNESS
[FreeTextEntry1] : 32 y/o F with history of Stricturing CD dx 2020, patient of Dr. Tonia Prieto, currently on Inflectra q4 weeks, presenting today for telemedicine s/p recent MRE. 5/3/23 MRE with involvement of 3.5cm segment of TI at the ICV with luminal narrowing (stable from prior studies) and resolved previously visualized colonic involvement. Last scope 1/2023 with anal stricture dilated to 10-12mm and limited mucosal evaluation due to inadequate prep. Pt has been using home dilator kit but reports that she has not seen much benefit from it (unclear which size was biggest she tried). Shes been experiencing constipation with formed to hard stool, generally every other day now and is wary of straining to avoid bleeding.\par \par Prior HPI:\par \par 32 y/o F patient with history of Stricturing Crohn Disease, patient of Dr. Tonia Prieto, currently on Inflectra q4 weeks. Presenting today for telemedicine follow up after recent cscope. \par \par CSCOPE: A very tight anal stricture did not initially allow passage of adult \par colonoscope. A gastroscope was used to bypass the anal stricture into the rectum \par and then a colonoscope was used to do the same with some resistance at the anus. \par The colon was full of stool that could not be satisfactorily cleared. The \par visible mucosa did not have evidence of ulceration or inflammation to the level \par of the transverse colon. The anal canal was dilated using TTE 10-12mm upon w/d. \par \par Remarkably better since her last visit and generally is asymptomatic between every 4 week infusions. We had been planning to start an immunomodulators as her last drug level monitoring suggested the development of antibodies against Renflexis but a repeat study with the [employer] assay does not show any antibodies and a higher level of in Inflectra. \par Pt reports less pain after cscope as well. \par \par Prior visit 7/13/22:\par Since her last visit to the clinic, patient had a colonoscopy with Dr Wu on 7/5/22. Results significant for anorectal stricture, s/p digital dilatation and ICV stricture, s/p dilatation 8-12 mm, still unable to pass through with the PCF scope. Notes feeling no change/improvement as far as her pain since her dilatation. Notes that she occasionally has a hard time passing stool and noted some blood with straining approximately 2 weeks ago. Notes her RLQ cramping abdominal pain persists, occurring approximately 2x/week. \par \par ROS otherwise negative for nausea/vomiting, inability to tolerate PO, weight loss. Remainder of ROS negative. \par \par Colonoscopy (7/5/22): Digital dilation was performed to be able pass PCF beyond anorectal stricture. The rest of the colon had diffuse apthae/exudate affecting >50% of surface area. The ICV was deformed and ulcerated and had only a pinhole opening. Under fluoroscopy guidance, a guidewire was passed and the stricture was dilated incrementally form 8 - 12mm. The stricture was still did not allow passage of colonoscope. (Sigmoid colon pathology --> colonic mucosa showing mild to moderate, focally active chronic inflammation. No dysplasia noted) \par \par ____________________________________________________________________________\par Crohn Disease \par Patient was diagnosed in 2019, after she had loss of weight accompanied with intolerance of PO intake, vomiting and abdominal pain. She underwent a colonoscopy and was found to have anal stenosis and IC valve stricture with ulceration. Initially started on Remicade q8 weeks, not much symptom improvement and later changed to q4 weeks (Remicade changed to biosimilar)\par \par Currently she complains of intermittent lower abdominal pain, she has changed her diet over the past year and avoids foods which cause her symptoms. Notices some blood in stools. No nausea, vomiting reported. \par BM 2-3 times/day, consistency varies with diet. \par \par Colonoscopy completed 2/24/20\par - JOSE remarkable for perianal skin tags\par - firm circumferential nodularity in the anus causing stricture. Could NOT be traversed with pediatric colonoscopy, finally traversed with adult gastroscope. Reached to the ascending colon but likely visualizing ICV\par - large ulcer seen at farthest extent reached\par \par MRI 12/2021\par - Stable 3 cm segment terminal ileum with moderate active inflammatory disease including prominent mural hyperenhancement and restricted diffusion. The segment again demonstrated probable stricture (intraluminal narrowing without upstream dilation). \par - Long segment distal colon with ch previously demonstrated active inflammatory disease now appears significantly improved with a 4 cm residual segment demonstrating persistent mild active inflammatory disease \par - NO evidence of penetrating or perianal disease\par - NO extraintestinal sequela of inflammatory bowel disease \par \par Case discussed with multidisciplinary IBD conference- \par Continue with medical management for now. Can try dilation of anal stenosis, but surgical evaluation suggested easy passage of digit and no evidence of anal stenosis as a cause of outlet obstruction (also agreed based on imaging). Bowel regimen to help with constipation. Group agreed that endoscopic dilation of IVC would be beneficial.

## 2023-05-25 NOTE — CONSULT LETTER
[Dear  ___] : Dear  [unfilled], [Please see my note below.] : Please see my note below. [Consult Closing:] : Thank you very much for allowing me to participate in the care of this patient.  If you have any questions, please do not hesitate to contact me. [Sincerely,] : Sincerely, [Courtesy Letter:] : I had the pleasure of seeing your patient, [unfilled], in my office today. [FreeTextEntry3] : Raciel Wu MD\par Professor of Medicine\par Chief of GI\par Director IBD Program\par [facility]\par

## 2023-07-05 ENCOUNTER — APPOINTMENT (OUTPATIENT)
Dept: GASTROENTEROLOGY | Facility: CLINIC | Age: 33
End: 2023-07-05

## 2023-07-25 ENCOUNTER — APPOINTMENT (OUTPATIENT)
Dept: GASTROENTEROLOGY | Facility: HOSPITAL | Age: 33
End: 2023-07-25

## 2023-10-31 ENCOUNTER — TRANSCRIPTION ENCOUNTER (OUTPATIENT)
Age: 33
End: 2023-10-31

## 2023-10-31 ENCOUNTER — APPOINTMENT (OUTPATIENT)
Dept: GASTROENTEROLOGY | Facility: HOSPITAL | Age: 33
End: 2023-10-31

## 2023-10-31 ENCOUNTER — OUTPATIENT (OUTPATIENT)
Dept: OUTPATIENT SERVICES | Facility: HOSPITAL | Age: 33
LOS: 1 days | Discharge: HOME | End: 2023-10-31
Payer: COMMERCIAL

## 2023-10-31 VITALS
SYSTOLIC BLOOD PRESSURE: 107 MMHG | HEART RATE: 78 BPM | HEIGHT: 67 IN | OXYGEN SATURATION: 99 % | RESPIRATION RATE: 18 BRPM | WEIGHT: 167.99 LBS | DIASTOLIC BLOOD PRESSURE: 69 MMHG | TEMPERATURE: 98 F

## 2023-10-31 PROCEDURE — C1726: CPT

## 2023-10-31 PROCEDURE — 45386 COLONOSCOPY W/BALLOON DILAT: CPT

## 2023-10-31 PROCEDURE — C1769: CPT

## 2023-10-31 DEVICE — CATH BLLN CRE .035INX7.7FR 12-13.5-15MM: Type: IMPLANTABLE DEVICE | Status: FUNCTIONAL

## 2023-10-31 DEVICE — CATH BLLN CRE .035INX7.7FR 10-11-12MM: Type: IMPLANTABLE DEVICE | Status: FUNCTIONAL

## 2023-10-31 DEVICE — HYDRA WIRE: Type: IMPLANTABLE DEVICE | Status: FUNCTIONAL

## 2023-10-31 NOTE — PRE-ANESTHESIA EVALUATION ADULT - NSANTHPMHFT_GEN_ALL_CORE
Cardiac: Denies HTN, HLD, MI/Angina/Heart Failure, Arrhythmia, Murmur/Valvular Disorder. >4 METS  Pulmonary: Denies Asthma, COPD, JOSEPHINE  Renal: Denies kidney dysfunction  Hepatic: Denies liver dysfunction  Gastrointestinal: Positive for Crohn's Disease complicated by strictures. Multiple food allergies.  Endocrine: Denies DM or thyroid dysfunction  Neurologic: Denies stroke/seizure disorder  Hematologic: Denies anemia, blood clotting disorder, blood thinning medication.    PSH: IUD placement two years ago, multiple colonoscopies.

## 2023-10-31 NOTE — PRE-ANESTHESIA EVALUATION ADULT - NSANTHOSAYNRD_GEN_A_CORE
No. JOSEPHINE screening performed.  STOP BANG Legend: 0-2 = LOW Risk; 3-4 = INTERMEDIATE Risk; 5-8 = HIGH Risk

## 2023-10-31 NOTE — PRE-ANESTHESIA EVALUATION ADULT - NSDENTALSD_ENT_ALL_CORE
Missing rear molars, several cavities noted, poor dentition with minor chips/grinding/worn down teeth./missing teeth

## 2023-11-07 ENCOUNTER — APPOINTMENT (OUTPATIENT)
Dept: GASTROENTEROLOGY | Facility: CLINIC | Age: 33
End: 2023-11-07
Payer: COMMERCIAL

## 2023-11-07 PROCEDURE — 99213 OFFICE O/P EST LOW 20 MIN: CPT | Mod: 95

## 2023-11-08 ENCOUNTER — APPOINTMENT (OUTPATIENT)
Dept: GASTROENTEROLOGY | Facility: CLINIC | Age: 33
End: 2023-11-08

## 2023-11-27 ENCOUNTER — APPOINTMENT (OUTPATIENT)
Dept: COLORECTAL SURGERY | Facility: CLINIC | Age: 33
End: 2023-11-27
Payer: COMMERCIAL

## 2023-11-27 ENCOUNTER — NON-APPOINTMENT (OUTPATIENT)
Age: 33
End: 2023-11-27

## 2023-11-27 VITALS
HEIGHT: 67 IN | DIASTOLIC BLOOD PRESSURE: 75 MMHG | HEART RATE: 85 BPM | BODY MASS INDEX: 27 KG/M2 | SYSTOLIC BLOOD PRESSURE: 110 MMHG | WEIGHT: 172 LBS | TEMPERATURE: 98.2 F

## 2023-11-27 PROCEDURE — 99214 OFFICE O/P EST MOD 30 MIN: CPT

## 2023-11-27 RX ORDER — LEVONORGESTREL 52 MG/1
INTRAUTERINE DEVICE INTRAUTERINE
Refills: 0 | Status: ACTIVE | COMMUNITY

## 2023-11-29 ENCOUNTER — NON-APPOINTMENT (OUTPATIENT)
Age: 33
End: 2023-11-29

## 2023-12-02 ENCOUNTER — APPOINTMENT (OUTPATIENT)
Dept: MRI IMAGING | Facility: CLINIC | Age: 33
End: 2023-12-02

## 2023-12-19 ENCOUNTER — APPOINTMENT (OUTPATIENT)
Dept: MRI IMAGING | Facility: CLINIC | Age: 33
End: 2023-12-19

## 2023-12-28 ENCOUNTER — APPOINTMENT (OUTPATIENT)
Dept: MRI IMAGING | Facility: CLINIC | Age: 33
End: 2023-12-28
Payer: COMMERCIAL

## 2023-12-28 ENCOUNTER — OUTPATIENT (OUTPATIENT)
Dept: OUTPATIENT SERVICES | Facility: HOSPITAL | Age: 33
LOS: 1 days | End: 2023-12-28

## 2023-12-28 PROCEDURE — 72197 MRI PELVIS W/O & W/DYE: CPT | Mod: 26

## 2023-12-29 ENCOUNTER — APPOINTMENT (OUTPATIENT)
Dept: COLORECTAL SURGERY | Facility: CLINIC | Age: 33
End: 2023-12-29

## 2024-01-09 ENCOUNTER — NON-APPOINTMENT (OUTPATIENT)
Age: 34
End: 2024-01-09

## 2024-02-05 ENCOUNTER — APPOINTMENT (OUTPATIENT)
Dept: COLORECTAL SURGERY | Facility: CLINIC | Age: 34
End: 2024-02-05
Payer: COMMERCIAL

## 2024-02-05 VITALS
HEART RATE: 90 BPM | HEIGHT: 67 IN | BODY MASS INDEX: 27.15 KG/M2 | SYSTOLIC BLOOD PRESSURE: 113 MMHG | WEIGHT: 173 LBS | DIASTOLIC BLOOD PRESSURE: 78 MMHG | TEMPERATURE: 97.4 F

## 2024-02-05 PROCEDURE — 99214 OFFICE O/P EST MOD 30 MIN: CPT

## 2024-02-05 NOTE — ASSESSMENT
[FreeTextEntry1] : Crohn's disease with significant anal rectal involvement.  I have reviewed with the patient that at this time she has persistent inflammatory aspect of her anal Crohn's disease.  Although documented fistula to both rectovaginal-not symptomatic and intersphincteric there is no evidence of active undrained abscess/infectious process.  Given the persistent inflammatory aspect I recommend she continue with medical management of her Crohn's disease.  However if symptoms change, and develops pain or increasing difficulty with bowel function I have outlined to her that she may require fecal diversion.  I have outlined to her that the treatment for Crohn's disease surgically is to address the complications of the disease.  First-line treatment would include medical therapy.  However if medical therapy is unable to control her disease we will reconsider surgical therapy including colostomy and possible proctectomy.  The risks, benefits and alternatives of the procedures and treatment plan have been reviewed.  All questions answered.

## 2024-02-05 NOTE — HISTORY OF PRESENT ILLNESS
[FreeTextEntry1] : 33 y/o F presents for f/u Crohn's disease  Primary GI Dr. Tonia Prieto, Crohn's managed by Dr. Wu   Colonoscopy completed 20 - JOSE remarkable for perianal skin tags - firm circumferential nodularity in the anus causing stricture. Could NOT be traversed with pediatric colonoscopy, finally traversed with adult gastroscope. Reached to the ascending colon but likely visualizing ICV - large ulcer seen at farthest extent reached  Seen in the office on 3/23/20 after initial diagnosis. Patient was advised to return to GI and begin medical therapy  MRI Enterography completed 21 - compared to 3/3/20, the inflammatory change of the distal ileum shows improvement. Previously appreciated long ileal segment mild inflammatory change is no longer appreciated. However, there is newly appreciated descending colon inflammatory change - no evidence of bowel obstruction, proximal small bowel dilatation, fistula or abscess - IUD appear to be upside down in position within the uterine cavity  Office visit 21 on exam noted A quiescent, closed right anterior anal fistula opening at right anterior anal fistula opening in the 1 o'clock position. Sphincter tone was tight with moderate diffuse tenderness. Anoscopy deferred due to pain. Advised to begin trial of Mesalamine suppositories and/or possible EUA w/ dilation.  Seen in office 2021, on Remicaide Q4 wks, c/o fecal urgency and fecal incontinence about every 2 weeks. Exam: Perianal Skin: excoriations, a fistula (quiescent- closed right anterior anal fistula opening) in the 5:00 position and Superficial skin erosions/excoriations extending along the posterior anal margin of the inter  cleft and perianal skin. Edematous right posterior base external hemorrhoidal tag with small anterior tags. The sphincter tone was tight. able to pass fifth finger on rectal examination. Advised complex perianal disease with anal fistula and stricturing of distal rectum. No surgical intervention advised, recommended MRI and further evaluation with colonoscopy for surveillance of IBD  MRI 2021 - Stable 3 cm segment terminal ileum with moderate active inflammatory disease including prominent mural hyperenhancement and restricted diffusion. The segment again demonstrated probable stricture (intraluminal narrowing without upstream dilation). - Long segment distal colon with ch previously demonstrated active inflammatory disease now appears significantly improved with a 4 cm residual segment demonstrating persistent mild active inflammatory disease - NO evidence of penetrating or perianal disease - NO extraintestinal sequela of inflammatory bowel disease  Case discussed with multidisciplinary IBD conference- Continue with medical management for now. Can try dilation of anal stenosis, but surgical evaluation suggested easy passage of digit and no evidence of anal stenosis as a cause of outlet obstruction (also agreed based on imaging). Bowel regimen to help with constipation. Group agreed that endoscopic dilation of IVC would be beneficial.  Colonoscopy (22) w/ Dr. Wu: Digital dilation was performed to be able pass PCF beyond anorectal stricture. The rest of the colon had diffuse apthae/exudate affecting >50% of surface area. The ICV was deformed and ulcerated and had only a pinhole opening. Under fluoroscopy guidance, a guidewire was passed and the stricture was dilated incrementally form 8 - 12mm. The stricture was still did not allow passage of colonoscope. (Sigmoid colon pathology --> colonic mucosa showing mild to moderate, focally active chronic inflammation. No dysplasia noted)  Last MRE 2023: IMPRESSION: 3.5 cm short segment involvement of the terminal ileum at the ileocecal valve with luminal narrowing, most consistent with chronic fibro stenotic disease, not significantly changed since prior studies.  Last Colonoscopy 10/31/23: Findings: Additional findings On JOSE there was a 6cm distal rectal stricture from 3-9cm from anal verge that was manually dilated prior to procedure and allowed passage of the PCF colonoscope. The stricture was later balloon dilated from 12 to 13.5 cm upon completion of the colonoscopy. There was ulceration and a pinhole stricture at the entrance of the terminal ileum. A guidewire was used to confirm placement. The stricture was dilated from 10 to 15mm; however, the PCF colonoscope was unable to pass the stricture following dilation. The rest of the colon was otherwise unremarkable..  Impressions: On JOSE there was a 6cm distal rectal stricture from 3-9cm from anal verge that was manually dilated prior to procedure and then balloon dilated from 12 to 13cm upon completion of the colonoscopy. There was ulceration and a pinhole stricture at the entrance of the terminal ileum. The stricture was dilated from 10 to 15; however, the PCF colonoscope was unable to pass the stricture following dilation. The rest of the colon was otherwise unremarkable.  Follow-up office visit. We did discuss that she should be evaluated by colorectal to address the stricture with a resection. Continue Inflectra [id number] and rectal dilations.  Followed w/ GI NP [place] 23, -clinical status, MRE without colonic involvement and stable TI segment, and healed visualized mucosa on last scope suggestive of good disease control on current regimen - unclear if recent constipation is due to tightening again of anal stricture vs true constipation. She is too anxious to inc the size of her dilator. Advised to start Miralax daily as no relief with recent CSCOPE. - She has intermediate to normal level of TPMT but no plans to start immunomodulators given her drug levels are therapeutic without evidence of antibodies to Inflectra in Sept - can check again later this year   Last seen 23, On exam; skin erosions / excoriations extending along the posterior anal margin of the inter  cleft and perianal skin. Edematous right posterior base external hemorrhoidal tag with small anterior tags, excoriations and fistula (RAQ) area of induration and fistula opening with expressible drainage mildly tender in 5:00 position. Sphincter tone was tight, able to pass fifth finger on rectal exam. MRI advised for further assessment and extent of perianal Crohn's disease. Role of possible EUA with dilation and possible drainage and seton placement reviewed.   MRI pelvis performed at [facility] on 23 ANAL FISTULA: Present: Yes LOCATION: MUCOSAL OPENING (based on axial images): Right anterior quadrant (6:26) EXTERNAL OPENING: Left inner gluteal fold CLASSIFICATION: Intersphincteric ACTIVITY: Healing granulation tissue (linear enhancement without fluid) COURSE OF FISTULOUS TRACT: Nonbranching (15:38-47) DISTANCE FROM MUCOSAL DEFECT TO THE PERIANAL SKIN (measured on coronal imaging): 2.5 cm (9:14) ADDITIONAL COMMENTS: None SECONDARY FISTULA OR ABSCESS (description if present): Second fistula with mucosal opening arising from anterior midline superior anal canal communicating with posterior vaginal wall (6:22). 3 x 3 mm T2 hyperintense focus with peripheral enhancement (18:32, 6:21) suggesting tiny abscess, suspected communication with anal mucosa at separate level right anterior quadrant at level of anal rectal junction/dentate line (6:20; 9:14). REPRODUCTIVE ORGANS: Left ovary within normal limits. Right ovary not visualized. Intrauterine device in place. BLADDER: Within normal limits. LYMPH NODES: No pelvic lymphadenopathy. VISUALIZED PORTIONS: PERITONEUM: No ascites. VESSELS: Within normal limits. ABDOMINAL WALL: Within normal limits. BONES: Within normal limits.  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 Both demonstrate healing changes. Tiny abscess at left anorectal junction.  Cheko RIGGINS contact patient 24 to review findings, evidence of rectovaginal fistula, patient currently asymptomatic. Recommended pt to f/u with Gi for medical management. If symptomatic despite medication advised to return to office.   Patient presents for f/u c/w Inflectra q 4 wks, last done 01/15/2024. Not doing rectal dilations.  Patient reports noting rectal bleeding  w/ straining, states she will have urgency, try to go, pass gas and then notice blood when wiping, states noting almost daily. Denies any pain. BM are soft and thin happens every other day. Patient reports on Miralax 1 capful daily if not she is constipated. also reports some overflow of stool, noted some small steam of stool liquid but feels incomplete evacuation.   Denies passing gas through vagina. Denies passing stool/particles via vagina.

## 2024-02-05 NOTE — PHYSICAL EXAM
[Abdomen Masses] : No abdominal masses [Abdomen Tenderness] : ~T No ~M abdominal tenderness [No HSM] : no hepatosplenomegaly [None] : no anal fissures seen [Excoriation] : excoriations [Fistula] : a fistula [5:00] : in the 5:00 position [Tight] : was tight [de-identified] : Superficial skin erosions/excoriations extending along the posterior anal margin of the inter  cleft and perianal skin.  Edematous right posterior base external hemorrhoidal tag with small anterior tags. [de-identified] : Edematous mildly indurated tag along the right posterior anal verge [de-identified] : able to pass fifth finger on rectal examination-

## 2024-03-13 ENCOUNTER — APPOINTMENT (OUTPATIENT)
Dept: GASTROENTEROLOGY | Facility: CLINIC | Age: 34
End: 2024-03-13

## 2024-03-18 ENCOUNTER — APPOINTMENT (OUTPATIENT)
Dept: GASTROENTEROLOGY | Facility: CLINIC | Age: 34
End: 2024-03-18
Payer: COMMERCIAL

## 2024-03-18 VITALS
OXYGEN SATURATION: 98 % | TEMPERATURE: 96.4 F | HEIGHT: 67 IN | RESPIRATION RATE: 16 BRPM | BODY MASS INDEX: 27.94 KG/M2 | WEIGHT: 178 LBS | DIASTOLIC BLOOD PRESSURE: 70 MMHG | SYSTOLIC BLOOD PRESSURE: 120 MMHG | HEART RATE: 101 BPM

## 2024-03-18 PROCEDURE — G2211 COMPLEX E/M VISIT ADD ON: CPT

## 2024-03-18 PROCEDURE — 99215 OFFICE O/P EST HI 40 MIN: CPT

## 2024-03-18 RX ORDER — CIPROFLOXACIN HYDROCHLORIDE 500 MG/1
500 TABLET, FILM COATED ORAL
Qty: 20 | Refills: 1 | Status: COMPLETED | COMMUNITY
Start: 2023-11-27 | End: 2024-03-18

## 2024-03-18 RX ORDER — SODIUM PHOSPHATE, DIBASIC AND SODIUM PHOSPHATE, MONOBASIC 7; 19 G/230ML; G/230ML
ENEMA RECTAL
Qty: 1 | Refills: 0 | Status: COMPLETED | COMMUNITY
Start: 2023-10-10 | End: 2024-03-18

## 2024-03-18 RX ORDER — AMOXICILLIN AND CLAVULANATE POTASSIUM 875; 125 MG/1; MG/1
875-125 TABLET, COATED ORAL
Qty: 24 | Refills: 0 | Status: COMPLETED | COMMUNITY
Start: 2023-11-29 | End: 2024-03-18

## 2024-03-18 RX ORDER — MESALAMINE 1000 MG/1
1000 SUPPOSITORY RECTAL
Qty: 30 | Refills: 1 | Status: COMPLETED | COMMUNITY
Start: 2021-01-29 | End: 2024-03-18

## 2024-03-18 RX ORDER — METRONIDAZOLE 500 MG/1
500 TABLET ORAL 3 TIMES DAILY
Qty: 30 | Refills: 0 | Status: COMPLETED | COMMUNITY
Start: 2023-11-27 | End: 2024-03-18

## 2024-03-22 NOTE — HISTORY OF PRESENT ILLNESS
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  3/18/2024 Patient follows with gastroenterologist Dr. Tonia Prieto. Most recent colonoscopy 10/2023 revealed pinhole stricture of terminal ileum with ulceration, dilated 10 to 15, unable to pass with PCF scope. Anal stricture manually dilated prior to procedure and then balloon dilated from 12 to 13cm. MR pelvis obtained 1/2024 revealed left intersphincteric perianal fistula and separate rectovaginal fistula, both demonstrate healing changes as well as tiny abscess at left anorectal junction. Was then evaluated by Dr. Talamantes colorectal surgery to discuss possible surgical options given anal stricture as well as 2x fistulas (intersphincteric and rectovaginal). Given persistent inflammatory aspect of anal disease, recommendation was made to optimize medical management prior to surgical intervention, as this may require fecal diversion. Patient states that over the previous few months since colonoscopy, she has had worsening constipation symptoms. States that she will have 1-3 small bowel movements per day that are occasionally bloody. Describes tenesmus and incomplete emptying feeling throughout most of the day, necessitating multiple trips to the restroom. Denies abdominal pain, nausea, vomiting, diarrhea, or melena. Denies new fever, weight loss, rash, joint pains, or other extraintestinal manifestations. Describes continued use of home anal balloon for self dilation, however only able to partially use due to pain. Persistently elevated CRP on labs from infusion center at outside facility.    Previous notes:  34 y/o F with history of Stricturing CD dx 2020, patient of Dr. Tonia Prieto, currently on Inflectra q4 weeks, presenting today for telemedicine s/p recent CSCOPE showing pinhole TI stricture and rectal stricture. Last MRE. 5/3/23 MRE with involvement of 3.5cm segment of TI at the ICV with luminal narrowing (stable from prior studies) and resolved previously visualized colonic involvement. Last scope 1/2023 with anal stricture dilated to 10-12mm and limited mucosal evaluation due to inadequate prep. Pt has been using home dilator kit but reports that she has not seen much benefit from it (unclear which size was biggest she tried). Shes been experiencing constipation with formed to hard stool, generally every other day now and is wary of straining to avoid bleeding.  Since CSCOPE reports constipation remains. Not on any laxatives. Denies fevers/chills since procedure. Overall back to baseline of constipation. Has not called Dr. Talamantes yet.  Prior HPI:  34 y/o F patient with history of Stricturing Crohn Disease, patient of Dr. Tonia Prieto, currently on Inflectra q4 weeks. Presenting today for telemedicine follow up after recent cscope.  CSCOPE: A very tight anal stricture did not initially allow passage of adult colonoscope. A gastroscope was used to bypass the anal stricture into the rectum and then a colonoscope was used to do the same with some resistance at the anus. The colon was full of stool that could not be satisfactorily cleared. The visible mucosa did not have evidence of ulceration or inflammation to the level of the transverse colon. The anal canal was dilated using TTE 10-12mm upon w/d.  Remarkably better since her last visit and generally is asymptomatic between every 4 week infusions. We had been planning to start an immunomodulators as her last drug level monitoring suggested the development of antibodies against Renflexis but a repeat study with the PromMirakls assay does not show any antibodies and a higher level of in Inflectra. Pt reports less pain after cscope as well.  Prior visit 7/13/22: Since her last visit to the clinic, patient had a colonoscopy with Dr Wu on 7/5/22. Results significant for anorectal stricture, s/p digital dilatation and ICV stricture, s/p dilatation 8-12 mm, still unable to pass through with the PCF scope. Notes feeling no change/improvement as far as her pain since her dilatation. Notes that she occasionally has a hard time passing stool and noted some blood with straining approximately 2 weeks ago. Notes her RLQ cramping abdominal pain persists, occurring approximately 2x/week.  ROS otherwise negative for nausea/vomiting, inability to tolerate PO, weight loss. Remainder of ROS negative.  Colonoscopy (7/5/22): Digital dilation was performed to be able pass PCF beyond anorectal stricture. The rest of the colon had diffuse apthae/exudate affecting >50% of surface area. The ICV was deformed and ulcerated and had only a pinhole opening. Under fluoroscopy guidance, a guidewire was passed and the stricture was dilated incrementally form 8 - 12mm. The stricture was still did not allow passage of colonoscope. (Sigmoid colon pathology --> colonic mucosa showing mild to moderate, focally active chronic inflammation. No dysplasia noted)  ____________________________________________________________________________ Crohn Disease Patient was diagnosed in 2019, after she had loss of weight accompanied with intolerance of PO intake, vomiting and abdominal pain. She underwent a colonoscopy and was found to have anal stenosis and IC valve stricture with ulceration. Initially started on Remicade q8 weeks, not much symptom improvement and later changed to q4 weeks (Remicade changed to biosimilar)  Currently she complains of intermittent lower abdominal pain, she has changed her diet over the past year and avoids foods which cause her symptoms. Notices some blood in stools. No nausea, vomiting reported. BM 2-3 times/day, consistency varies with diet.  Colonoscopy completed 2/24/20 - JOSE remarkable for perianal skin tags - firm circumferential nodularity in the anus causing stricture. Could NOT be traversed with pediatric colonoscopy, finally traversed with adult gastroscope. Reached to the ascending colon but likely visualizing ICV - large ulcer seen at farthest extent reached  MRI 12/2021 - Stable 3 cm segment terminal ileum with moderate active inflammatory disease including prominent mural hyperenhancement and restricted diffusion. The segment again demonstrated probable stricture (intraluminal narrowing without upstream dilation). - Long segment distal colon with ch previously demonstrated active inflammatory disease now appears significantly improved with a 4 cm residual segment demonstrating persistent mild active inflammatory disease - NO evidence of penetrating or perianal disease - NO extraintestinal sequela of inflammatory bowel disease  Case discussed with multidisciplinary IBD conference- Continue with medical management for now. Can try dilation of anal stenosis, but surgical evaluation suggested easy passage of digit and no evidence of anal stenosis as a cause of outlet obstruction (also agreed based on imaging). Bowel regimen to help with constipation. Group agreed that endoscopic dilation of IVC would be beneficial. Gastroenterology Summary   Colonoscopy: 10/31/23: Findings:    Additional findings On JOSE there was a 6cm distal rectal stricture from 3-9cm  from anal verge that was manually dilated prior to procedure and allowed passage  of the PCF colonoscope. The stricture was later balloon dilated from 12 to 13.5  cm upon completion of the colonoscopy. There was ulceration and a pinhole  stricture at the entrance of the terminal ileum. A guidewire was used to confirm  placement. The stricture was dilated from 10 to 15mm; however, the PCF  colonoscope was unable to pass the stricture following dilation. The rest of the  colon was otherwise unremarkable..    Impressions:    On JOSE there was a 6cm distal rectal stricture from 3-9cm from anal verge that  was manually dilated prior to procedure and then balloon dilated from 12 to 13cm  upon completion of the colonoscopy. There was ulceration and a pinhole stricture  at the entrance of the terminal ileum. The stricture was dilated from 10 to 15;  however, the PCF colonoscope was unable to pass the stricture following  dilation. The rest of the colon was otherwise unremarkable..    Plan:    Clear liquid diet for 4 hours, then advance to soft. Tomorrow advance to regular  diet as tolerated    Follow-up office visit. We did discuss that she should be evaluated by  colorectal to address the stricture with a resection.    Continue Inflectra [id number] and rectal dilations.

## 2024-03-22 NOTE — CONSULT LETTER
[Dear  ___] : Dear  [unfilled], [Courtesy Letter:] : I had the pleasure of seeing your patient, [unfilled], in my office today. [Please see my note below.] : Please see my note below. [Sincerely,] : Sincerely, [FreeTextEntry3] : Raciel Wu MD Professor of Medicine Chief of GI Director IBD Program [facility]

## 2024-03-22 NOTE — ASSESSMENT
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 Follows with colorectal surgeon Dr. Talamantes. Given persistent inflammatory aspect of anal disease, recommendation was made to optimize medical management prior to surgical intervention, as this may require fecal diversion.   #Stricturing and fistulizing Crohn's disease with worsening of obstructive symptoms likely related to anal stricture - Held extensive shared decision-making discussion with patient regarding potential therapeutic options. Option 1 would be to discontinue IFX and possibly enroll in duet-cd clinical trial to ideally better medically manage this patient, improve the previously underestimated inflammatory burden, improve obstructive symptoms related to strictures, and avoid/delay surgical intervention. Option 2 would be to possibly enroll in GIE drug coated balloon study for stricture dilation (would need to have ileal stricture dilated outside of study through non-study balloon first, then use study balloon for anal stricture). Option 3 would be to continue our current management with repeated colonoscopies with ileal and anal dilations as well as optimize medical management, likely by discontinuation of IFX and initiation of alternative advanced therapy. Ultimately, patient decided option 1 (enrollment in duet-cd) would be her preferred choice.  - Patient to skip today's IFX infusion so she will complete medication washout period to allow for sooner trial screening - Patient has met with our research coordinator Lawanda to assist with research screening - Labs from outside infusion center personally reviewed and within acceptable limits. Only CRP and platelet counts elevated - Patient to continue using anal home dilator as tolerated - Given complicated nature of case, will plan to discuss at monthly IBD multidisciplinary conference -I did call and discusss with the patient's gastroenterologist Dr. Tonia Prieto, who is in agreement  Follow up in 1 week  DO Krunal Young Advanced Inflammatory Bowel Disease Fellow

## 2024-03-22 NOTE — PHYSICAL EXAM
[Normal] : normal bowel sounds, non-tender, no masses, soft, no no hepato-splenomegaly [de-identified] : Some abdominal distention, nontender to palpation

## 2024-04-04 ENCOUNTER — OUTPATIENT (OUTPATIENT)
Dept: OUTPATIENT SERVICES | Facility: HOSPITAL | Age: 34
LOS: 1 days | End: 2024-04-04
Payer: COMMERCIAL

## 2024-04-04 ENCOUNTER — APPOINTMENT (OUTPATIENT)
Dept: GASTROENTEROLOGY | Facility: CLINIC | Age: 34
End: 2024-04-04
Payer: COMMERCIAL

## 2024-04-04 VITALS
HEART RATE: 88 BPM | DIASTOLIC BLOOD PRESSURE: 80 MMHG | RESPIRATION RATE: 14 BRPM | BODY MASS INDEX: 26.68 KG/M2 | SYSTOLIC BLOOD PRESSURE: 138 MMHG | WEIGHT: 170 LBS | HEIGHT: 67 IN | TEMPERATURE: 96.6 F | OXYGEN SATURATION: 98 %

## 2024-04-04 PROCEDURE — 74019 RADEX ABDOMEN 2 VIEWS: CPT

## 2024-04-04 PROCEDURE — 99499A: CUSTOM | Mod: NC

## 2024-04-04 PROCEDURE — 74019 RADEX ABDOMEN 2 VIEWS: CPT | Mod: 26

## 2024-04-04 RX ORDER — INFLIXIMAB 100 MG/10ML
INJECTION, POWDER, LYOPHILIZED, FOR SOLUTION INTRAVENOUS
Refills: 0 | Status: COMPLETED | COMMUNITY
End: 2024-04-04

## 2024-04-05 ENCOUNTER — TRANSCRIPTION ENCOUNTER (OUTPATIENT)
Age: 34
End: 2024-04-05

## 2024-04-05 NOTE — HISTORY OF PRESENT ILLNESS
[FreeTextEntry1] : Date: 4/4/24 Subject Initials: RACHELL  Subject Number: [id number] Protocol: [id number]  This is a 34 year old female with stricturing and fistulizing Crohn's disease previously on IFX q 4 weeks, last dose skipped; who presents today for screening visit. Here for screening visit for Phase III, DUET-CD  A Phase 2b Randomized, Double-blind, Active-and Placebo-controlled, Parallel-group, Multicenter Study to Evaluate the Efficacy and Safety of Induction and Maintenance Combination Therapy with Guselkumab and Golimumab in Participants with Moderately to Severely Active Crohn's Disease    Subject # met all the inclusion criteria and did not meet any exclusion criteria for DUET-CD and was enrolled on 1/29/2024. The study was explained; the subject had the opportunity to ask questions, and was given a signed copy of the consent form. Consent was obtained prior to the start of any study procedures.    The procedures listed in the consent with regard to contraception were reviewed with the subject. The subject agreed to continue with condoms for the entire study. The subject was informed to notify the study doctor immediately if he suspects a partner has become pregnant.

## 2024-04-05 NOTE — ASSESSMENT
[FreeTextEntry1] :  Date: 4/4/24 Subject Initials: RACHELL  Subject Number: [id number] Protocol: [id number]  This is a 34 year old female with stricturing and fistulizing Crohn's disease previously on IFX q 4 weeks, last dose skipped; who presents today for screening visit. Here for screening visit for Phase III, DUET-CD    Subject # met all the inclusion criteria and did not meet any exclusion criteria for DUET-CD and was enrolled on 1/29/2024. The study was explained; the subject had the opportunity to ask questions, and was given a signed copy of the consent form. Consent was obtained prior to the start of any study procedures.  ICF signed E-diary instructions given Labs collected Urine collected as per protocol Given her abdominal pain and nausea, plan for abdominal xray to evaluate stool burden/rule out SB dilation Consider starting prednisone this weekend for relief Cont Miralax daily Dietary counseling provided for low fiber diet   F/U as per trial protocol

## 2024-04-05 NOTE — PHYSICAL EXAM
[Alert] : alert [Normal Voice/Communication] : normal voice/communication [Healthy Appearing] : healthy appearing [No Acute Distress] : no acute distress [Sclera] : the sclera and conjunctiva were normal [Hearing Threshold Finger Rub Not Hocking] : hearing was normal [Normal Lips/Gums] : the lips and gums were normal [Oropharynx] : the oropharynx was normal [Normal Appearance] : the appearance of the neck was normal [No Neck Mass] : no neck mass was observed [No Respiratory Distress] : no respiratory distress [No Acc Muscle Use] : no accessory muscle use [Respiration, Rhythm And Depth] : normal respiratory rhythm and effort [Bowel Sounds] : normal bowel sounds [Abdomen Tenderness] : non-tender [No Masses] : no abdominal mass palpated [Abdomen Soft] : soft [] : no hepatosplenomegaly [Oriented To Time, Place, And Person] : oriented to person, place, and time [de-identified] : buttock superficial fissure, external fistula opening, no abscess, drainage seen

## 2024-04-08 ENCOUNTER — TRANSCRIPTION ENCOUNTER (OUTPATIENT)
Age: 34
End: 2024-04-08

## 2024-04-08 ENCOUNTER — INPATIENT (INPATIENT)
Facility: HOSPITAL | Age: 34
LOS: 5 days | Discharge: HOME | End: 2024-04-14
Attending: SURGERY | Admitting: SURGERY
Payer: COMMERCIAL

## 2024-04-08 VITALS
TEMPERATURE: 98 F | DIASTOLIC BLOOD PRESSURE: 78 MMHG | RESPIRATION RATE: 16 BRPM | SYSTOLIC BLOOD PRESSURE: 115 MMHG | OXYGEN SATURATION: 100 % | HEIGHT: 67 IN | WEIGHT: 166.01 LBS | HEART RATE: 117 BPM

## 2024-04-08 LAB
ALBUMIN SERPL ELPH-MCNC: 3.4 G/DL — SIGNIFICANT CHANGE UP (ref 3.3–5)
ALP SERPL-CCNC: 101 U/L — SIGNIFICANT CHANGE UP (ref 40–120)
ALT FLD-CCNC: 11 U/L — SIGNIFICANT CHANGE UP (ref 10–45)
ANION GAP SERPL CALC-SCNC: 8 MMOL/L — SIGNIFICANT CHANGE UP (ref 5–17)
APPEARANCE UR: CLEAR — SIGNIFICANT CHANGE UP
AST SERPL-CCNC: 13 U/L — SIGNIFICANT CHANGE UP (ref 10–40)
BACTERIA # UR AUTO: NEGATIVE /HPF — SIGNIFICANT CHANGE UP
BASOPHILS # BLD AUTO: 0.05 K/UL — SIGNIFICANT CHANGE UP (ref 0–0.2)
BASOPHILS NFR BLD AUTO: 0.4 % — SIGNIFICANT CHANGE UP (ref 0–2)
BILIRUB SERPL-MCNC: 0.3 MG/DL — SIGNIFICANT CHANGE UP (ref 0.2–1.2)
BILIRUB UR-MCNC: NEGATIVE — SIGNIFICANT CHANGE UP
BUN SERPL-MCNC: 6 MG/DL — LOW (ref 7–23)
CALCIUM SERPL-MCNC: 9.4 MG/DL — SIGNIFICANT CHANGE UP (ref 8.4–10.5)
CAST: 2 /LPF — SIGNIFICANT CHANGE UP (ref 0–4)
CHLORIDE SERPL-SCNC: 101 MMOL/L — SIGNIFICANT CHANGE UP (ref 96–108)
CO2 SERPL-SCNC: 25 MMOL/L — SIGNIFICANT CHANGE UP (ref 22–31)
COLOR SPEC: SIGNIFICANT CHANGE UP
CREAT SERPL-MCNC: 0.81 MG/DL — SIGNIFICANT CHANGE UP (ref 0.5–1.3)
DIFF PNL FLD: NEGATIVE — SIGNIFICANT CHANGE UP
EGFR: 96 ML/MIN/1.73M2 — SIGNIFICANT CHANGE UP
EOSINOPHIL # BLD AUTO: 0.18 K/UL — SIGNIFICANT CHANGE UP (ref 0–0.5)
EOSINOPHIL NFR BLD AUTO: 1.6 % — SIGNIFICANT CHANGE UP (ref 0–6)
GLUCOSE SERPL-MCNC: 92 MG/DL — SIGNIFICANT CHANGE UP (ref 70–99)
GLUCOSE UR QL: NEGATIVE MG/DL — SIGNIFICANT CHANGE UP
HCG SERPL-ACNC: <1 MIU/ML — SIGNIFICANT CHANGE UP
HCT VFR BLD CALC: 36.1 % — SIGNIFICANT CHANGE UP (ref 34.5–45)
HGB BLD-MCNC: 11.5 G/DL — SIGNIFICANT CHANGE UP (ref 11.5–15.5)
IMM GRANULOCYTES NFR BLD AUTO: 0.3 % — SIGNIFICANT CHANGE UP (ref 0–0.9)
KETONES UR-MCNC: 15 MG/DL
LACTATE SERPL-SCNC: 1.6 MMOL/L — SIGNIFICANT CHANGE UP (ref 0.5–2)
LEUKOCYTE ESTERASE UR-ACNC: ABNORMAL
LIDOCAIN IGE QN: 24 U/L — SIGNIFICANT CHANGE UP (ref 7–60)
LYMPHOCYTES # BLD AUTO: 2.5 K/UL — SIGNIFICANT CHANGE UP (ref 1–3.3)
LYMPHOCYTES # BLD AUTO: 21.9 % — SIGNIFICANT CHANGE UP (ref 13–44)
MCHC RBC-ENTMCNC: 27.1 PG — SIGNIFICANT CHANGE UP (ref 27–34)
MCHC RBC-ENTMCNC: 31.9 GM/DL — LOW (ref 32–36)
MCV RBC AUTO: 85.1 FL — SIGNIFICANT CHANGE UP (ref 80–100)
MONOCYTES # BLD AUTO: 1.43 K/UL — HIGH (ref 0–0.9)
MONOCYTES NFR BLD AUTO: 12.5 % — SIGNIFICANT CHANGE UP (ref 2–14)
MUCOUS THREADS # UR AUTO: PRESENT
NEUTROPHILS # BLD AUTO: 7.23 K/UL — SIGNIFICANT CHANGE UP (ref 1.8–7.4)
NEUTROPHILS NFR BLD AUTO: 63.3 % — SIGNIFICANT CHANGE UP (ref 43–77)
NITRITE UR-MCNC: NEGATIVE — SIGNIFICANT CHANGE UP
NRBC # BLD: 0 /100 WBCS — SIGNIFICANT CHANGE UP (ref 0–0)
PH UR: 5.5 — SIGNIFICANT CHANGE UP (ref 5–8)
PLATELET # BLD AUTO: 457 K/UL — HIGH (ref 150–400)
POTASSIUM SERPL-MCNC: 4.6 MMOL/L — SIGNIFICANT CHANGE UP (ref 3.5–5.3)
POTASSIUM SERPL-SCNC: 4.6 MMOL/L — SIGNIFICANT CHANGE UP (ref 3.5–5.3)
PROT SERPL-MCNC: 7.9 G/DL — SIGNIFICANT CHANGE UP (ref 6–8.3)
PROT UR-MCNC: SIGNIFICANT CHANGE UP MG/DL
RBC # BLD: 4.24 M/UL — SIGNIFICANT CHANGE UP (ref 3.8–5.2)
RBC # FLD: 13.1 % — SIGNIFICANT CHANGE UP (ref 10.3–14.5)
RBC CASTS # UR COMP ASSIST: 1 /HPF — SIGNIFICANT CHANGE UP (ref 0–4)
SODIUM SERPL-SCNC: 134 MMOL/L — LOW (ref 135–145)
SP GR SPEC: 1.02 — SIGNIFICANT CHANGE UP (ref 1–1.03)
SQUAMOUS # UR AUTO: 2 /HPF — SIGNIFICANT CHANGE UP (ref 0–5)
UROBILINOGEN FLD QL: 1 MG/DL — SIGNIFICANT CHANGE UP (ref 0.2–1)
WBC # BLD: 11.42 K/UL — HIGH (ref 3.8–10.5)
WBC # FLD AUTO: 11.42 K/UL — HIGH (ref 3.8–10.5)
WBC UR QL: 1 /HPF — SIGNIFICANT CHANGE UP (ref 0–5)

## 2024-04-08 PROCEDURE — 99285 EMERGENCY DEPT VISIT HI MDM: CPT

## 2024-04-08 PROCEDURE — 93010 ELECTROCARDIOGRAM REPORT: CPT

## 2024-04-08 PROCEDURE — 74177 CT ABD & PELVIS W/CONTRAST: CPT | Mod: 26,MC

## 2024-04-08 RX ORDER — SODIUM CHLORIDE 9 MG/ML
1000 INJECTION INTRAMUSCULAR; INTRAVENOUS; SUBCUTANEOUS ONCE
Refills: 0 | Status: COMPLETED | OUTPATIENT
Start: 2024-04-08 | End: 2024-04-08

## 2024-04-08 RX ORDER — IOHEXOL 300 MG/ML
30 INJECTION, SOLUTION INTRAVENOUS ONCE
Refills: 0 | Status: DISCONTINUED | OUTPATIENT
Start: 2024-04-08 | End: 2024-04-08

## 2024-04-08 RX ORDER — ACETAMINOPHEN 500 MG
650 TABLET ORAL EVERY 6 HOURS
Refills: 0 | Status: DISCONTINUED | OUTPATIENT
Start: 2024-04-08 | End: 2024-04-09

## 2024-04-08 RX ORDER — ONDANSETRON 8 MG/1
4 TABLET, FILM COATED ORAL ONCE
Refills: 0 | Status: COMPLETED | OUTPATIENT
Start: 2024-04-08 | End: 2024-04-08

## 2024-04-08 RX ORDER — SOD SULF/SODIUM/NAHCO3/KCL/PEG
4000 SOLUTION, RECONSTITUTED, ORAL ORAL ONCE
Refills: 0 | Status: COMPLETED | OUTPATIENT
Start: 2024-04-08 | End: 2024-04-08

## 2024-04-08 RX ORDER — ONDANSETRON 8 MG/1
4 TABLET, FILM COATED ORAL EVERY 6 HOURS
Refills: 0 | Status: DISCONTINUED | OUTPATIENT
Start: 2024-04-08 | End: 2024-04-09

## 2024-04-08 RX ORDER — SODIUM CHLORIDE 9 MG/ML
1000 INJECTION, SOLUTION INTRAVENOUS
Refills: 0 | Status: DISCONTINUED | OUTPATIENT
Start: 2024-04-08 | End: 2024-04-09

## 2024-04-08 RX ORDER — HEPARIN SODIUM 5000 [USP'U]/ML
5000 INJECTION INTRAVENOUS; SUBCUTANEOUS EVERY 8 HOURS
Refills: 0 | Status: DISCONTINUED | OUTPATIENT
Start: 2024-04-08 | End: 2024-04-09

## 2024-04-08 RX ORDER — ACETAMINOPHEN 500 MG
650 TABLET ORAL ONCE
Refills: 0 | Status: COMPLETED | OUTPATIENT
Start: 2024-04-08 | End: 2024-04-08

## 2024-04-08 RX ADMIN — Medication 4000 MILLILITER(S): at 23:42

## 2024-04-08 RX ADMIN — Medication 650 MILLIGRAM(S): at 15:16

## 2024-04-08 RX ADMIN — ONDANSETRON 4 MILLIGRAM(S): 8 TABLET, FILM COATED ORAL at 15:13

## 2024-04-08 RX ADMIN — SODIUM CHLORIDE 1000 MILLILITER(S): 9 INJECTION INTRAMUSCULAR; INTRAVENOUS; SUBCUTANEOUS at 15:11

## 2024-04-08 RX ADMIN — SODIUM CHLORIDE 115 MILLILITER(S): 9 INJECTION, SOLUTION INTRAVENOUS at 22:04

## 2024-04-08 RX ADMIN — Medication 10 MILLIGRAM(S): at 21:05

## 2024-04-08 NOTE — ED PROVIDER NOTE - PROGRESS NOTE DETAILS
[place]: WBC 11.42, Na 134, plt 457, other labs grossly wnl. UA trace leuk esterase, unlikely UTI. CT showed "There is an approximately 3-4 severe segment of wall thickening with probable mild active inflammation of the distal/terminal ileum extending to the ileocecal valve. No evidence of stricturing disease. No bowel obstruction. There is mild wall thickening of the rectosigmoid colon, which could be   due to underdistention or colitis. Possible subtle residual collapsed rectovaginal fistula, better evaluated on the comparison MRI and small persistent right-sided perianal fistula extending inferiorly to the right medial gluteal skin. Malpositioned intrauterine device rotated greater than 90 degrees with stem oriented toward the right cornua." Rediscussed w/ surgery, awaiting dispo. Vitals improved, pain improved. Updated pt. Remains well appearing. Marisa: Rediscussed w/ surgery, requesting 2 tabs dulcolax, admission for further care. Updated pt.

## 2024-04-08 NOTE — H&P ADULT - NSICDXFAMHXNEG_GEN_ALL
cancer Patient found to have PET avid Pancreatic mass. Unlikely that this mass is metastatic colon cancer and most likely represents a second primary lesion. Without tissue cannot confirm.   -Based on Tumor Board discussion whether second primary vs. metastatic unlikely to  and therefore may not need biopsy.  -Plan to treat Patient found to have PET avid Pancreatic mass. Unlikely that this mass is metastatic colon cancer and most likely represents a second primary lesion. Without tissue cannot confirm.   -Based on Tumor Board discussion whether second primary vs. metastatic unlikely to  and therefore may not need biopsy.  -Will follow up with Heme/Onc about need/utility of pancreatic biopsy.

## 2024-04-08 NOTE — H&P ADULT - HISTORY OF PRESENT ILLNESS
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     The patient was seen by GI (Dr. Wu) last Thursday for increasing abdominal pain, was prescribed steroid course (prednisone 10mg PO) , however AXR demonstrated large stool burden. Was recommending to come to ED on Friday for rectal dilation, however patient opted for noninteventional management at that time. She was recommended to take Miralax and laxatives for BMs. She states she was having loose BMs on Saturday, similar in consistency to when she took prep for colonoscopy, however no BMs since Saturday. She continues to pass flatus and tolerating liquids (as was suggested by GI) had one episode of nbnb emesis this AM, but tolerated dinner last night. No blood in stool endorsed/ Pt denies fever, chills, recent weight loss, chest pain, palpitations, SOB,  dysuria, dizziness, syncope.      PMHx - Crohns  PSHx - none  Meds - previously on infliximab, mesalamine (off for 8 weeks)  Allergies - NKDA  SoHx - socially EtOH, no tobacco/rec drugs  FamHx - no IBD, colon ca, bleeding or clotting disorders  CScope - 10/31/23 w/ balloon dilation;   Last Colonoscopy 10/31/23: Findings On JOSE there was a 6cm distal rectal stricture from 3-9cm from anal verge that was manually dilated prior to procedure and allowed passage of the PCF colonoscope. The stricture was later balloon dilated from 12 to 13.5 cm upon completion of the colonoscopy. There was ulceration and a pinhole stricture at the entrance of the terminal ileum. A guidewire was used to confirm placement. The stricture was dilated from 10 to 15mm; however, the PCF colonoscope was unable to pass the stricture following dilation. The rest of the colon was otherwise unremarkable    Of note, patient saw Dr. Talamantes in 02/24 and was advised to continue with medical management of her Chron's, but that if she were to get worsening signs of obstruction she would have to be newly evaluated with possible diversion/ protectomy.     In the ED, the patient is afebrile, tachycardic to 117, normotensive, and satting well on RA.   On exam abdomen is soft, minimally tender in all 4 quadrants, no rebound or guarding, nondistended. JOSE with no presence of active drainage of blood or purulence, able to only pass 5th digit through anal orifice.   Labs with WBC 11.42, no left shift. BMP with Na 134, BUN/Cr 6/.81  UA with trace LE and ketones.   CTAP shows an approximately 3-4 severe segment of wall thickening with probable mild active inflammation of the distal/terminal ileum extending to the ileocecal valve. No evidence of stricturing disease. No bowel obstruction.There is mild wall thickening of the rectosigmoid colon, which could be due to underdistention or colitis. Possible subtle residual collapsed rectovaginal fistula, better evaluated on the comparison MRI and small persistent right-sided perianal fistula extending inferiorly to the right medial gluteal skin. Malpositioned intrauterine device rotated greater than 90 degrees with stem oriented toward the right cornua.

## 2024-04-08 NOTE — CHART NOTE - NSCHARTNOTEFT_GEN_A_CORE
GI consulted by ED for IBD management as patient follows with Dr. Wu.     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     Patient seen and examined at bedside. States that she started having significant pain in her rectum followed by a lack of bowel movements and incomplete emptying, so contacted the GI office (Dr. Wu). Dr. Wu's office then ordered and abdominal x-ray and advised her to start laxatives and stool softeners. Patient states that she initially had some liquid bowel movements, but that shortly thereafter she quit having any bowel movements at all. Continues to pass flatus. At this point in time, was also prescribed prednisone 10 mg PO daily.     Underwent abdominal x-ray on 04/04/24, which was read as abundant feces/impaction with no abnormal bowel dilatation or pneumoperitoneum. Patient continued to have worsening pain and was unable to produce a bowel movement, so was then advised to come to the ED for admission and evaluation by colorectal surgery team. Patient has previously been seen by Dr. Talamantes out-patient.     Once abdominal x-ray showed evidence of fecal impaction, patient was initially planned for an anal dilation with the CRS; however, after discussion with Dr. Talamantes's office, it was ultimately concluded that an ER admission would be preferable as opposed to scheduling the OR given severity of symptoms and possible need for fecal diversion.     Additional collateral obtained from recent out-patient notes:   After an out-patient visit on 03/18/24, patient elected to skip her most recent IFX infusion in order to undergo a medication washout period to allow for sooner research trial screening.   After a thoughtful discussion, patient opted to enroll in duet-cd clinical trial to ideally achieve better medically management, improve the previously underestimated inflammatory burden, improve obstructive symptoms related to strictures, and avoid/delay surgical intervention.    Patient is currently hemodynamically stable.   Labs significant for:   WBC 11.42 (in setting of steroids), H/H 11.5/36.1, plt 457   CMP within normal limits with Na 124, K 4.6, Cr 0.81, AST/ALT 13/11, alk phos 101, total bili 0.3     PHYSICAL EXAM:  General: No acute distress, appears comfortable   Lungs: Normal respiratory effort and no intercostal retractions  Cardiovascular: RRR  Abdomen: Soft, non-tender, non-distended  Neurological: Alert and oriented x3  Skin: Warm and dry. No obvious rash    Recommendations:   - agree with admission to surgical team   - f/u CT abdomen/pelvis performed in the ED   - send fecal calprotectin and trend CRP daily   - appreciate input from surgical team on best approach to management of rectal stricture  - no indication for steroids at this times as symptoms most likely 2/2 to known stricture as opposed to Crohn's disease flare     Case discussed with Dr. Wu. GI Team will continue to follow.     Gisella Quigley D.O.   Gastroenterology Fellow  Weekday 7am-5pm Pager: [phone]  Weeknights/Weekend/Holiday Coverage: Please call the  for contact info
SUBJECTIVE: Patient is resting comfortably and in no acute distress. Pain is controlled with current regimen. Received 2 dulcolax in the ED and starting 2L golytely currently, no bowel movements as of yet. Denies any nausea/vomiting.     OBJECTIVE:   MEDICATIONS  (STANDING):  heparin   Injectable 5000 Unit(s) SubCutaneous every 8 hours  lactated ringers. 1000 milliLiter(s) (115 mL/Hr) IV Continuous <Continuous>    MEDICATIONS  (PRN):  acetaminophen     Tablet .. 650 milliGRAM(s) Oral every 6 hours PRN Mild Pain (1 - 3)  bisacodyl 5 milliGRAM(s) Oral every 12 hours PRN Constipation  ondansetron Injectable 4 milliGRAM(s) IV Push every 6 hours PRN Nausea      Vital Signs Last 24 Hrs  T(C): 36.8 (2024 22:30), Max: 36.8 (2024 20:53)  T(F): 98.3 (2024 22:30), Max: 98.3 (2024 22:30)  HR: 83 (2024 22:30) (80 - 117)  BP: 108/75 (2024 22:30) (104/70 - 115/78)  BP(mean): --  RR: 18 (2024 22:30) (16 - 18)  SpO2: 100% (2024 22:30) (98% - 100%)    Parameters below as of 2024 22:30  Patient On (Oxygen Delivery Method): room air      Physical Exam:  General: NAD, resting comfortably in bed  Pulmonary: Nonlabored breathing, no respiratory distress  Abdominal: soft, ND, (+) TTP to the mid abdomen/periumbilical region   Extremities: WWP, normal strength  Neuro: A/O x 3, normal motor/sensation    I&O's Summary      LABS:                        11.5   11.42 )-----------( 457      ( 2024 14:59 )             36.1     04-08    134<L>  |  101  |  6<L>  ----------------------------<  92  4.6   |  25  |  0.81    Ca    9.4      2024 14:59    TPro  7.9  /  Alb  3.4  /  TBili  0.3  /  DBili  x   /  AST  13  /  ALT  11  /  AlkPhos  101  04-08      Urinalysis Basic - ( 2024 14:59 )    Color: Dark Yellow / Appearance: Clear / S.024 / pH: x  Gluc: 92 mg/dL / Ketone: 15 mg/dL  / Bili: Negative / Urobili: 1.0 mg/dL   Blood: x / Protein: Trace mg/dL / Nitrite: Negative   Leuk Esterase: Trace / RBC: 1 /HPF / WBC 1 /HPF   Sq Epi: x / Non Sq Epi: 2 /HPF / Bacteria: Negative /HPF      CAPILLARY BLOOD GLUCOSE        LIVER FUNCTIONS - ( 2024 14:59 )  Alb: 3.4 g/dL / Pro: 7.9 g/dL / ALK PHOS: 101 U/L / ALT: 11 U/L / AST: 13 U/L / GGT: x

## 2024-04-08 NOTE — ED ADULT TRIAGE NOTE - CHIEF COMPLAINT QUOTE
pmhx Crohn's c/o lower abdominal pain, N/V, since thursday and constipation since yesterday. Outpatient XR showed constipation that was unrelieved by laxatives  and miralax so she was sent here. Denies h/o similar sx. EKG completed in triage for tachycardia 117.

## 2024-04-08 NOTE — ED ADULT NURSE REASSESSMENT NOTE - NS ED NURSE REASSESS COMMENT FT1
Received pt from night shift RN. Pt resting comfortably, NAD. Speaking in clear coherent sentences, respirations are spontaneous and unlabored.

## 2024-04-08 NOTE — H&P ADULT - ASSESSMENT
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     Will opt to admit patient for initial conservative management of partially obstructive symptoms (patient still passing gas) with aggresive bowel regimen. If patient clincally exhibits worsening signs of obstruction or fails conservative treatment, will opt to pursue surgical intervention with fecal diversion +/- proctectomy.     Plan     Admit to Dr. 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     Will opt to admit patient for initial conservative management of partially obstructive symptoms (patient still passing gas) with aggresive bowel regimen. If patient clincally exhibits worsening signs of obstruction or fails conservative treatment, will opt to pursue surgical intervention with fecal diversion +/- proctectomy.     Plan     Admit to Dr. Talamantes, regional, Team 5  CLD/IVF resuscitation   2 dulcolax/ Golytely solution (1/2 gallon overnight - 1/2 during day 4/9)   Pain, nausea control PRN   [place]  OOB/SCDs/ SQH   Appreciate GI recs  Rest of plans pending clinical course   Plan discssed with attending and chief resident on call

## 2024-04-08 NOTE — H&P ADULT - NSHPLABSRESULTS_GEN_ALL_CORE
LABS:                        11.5   11.42 )-----------( 457      ( 2024 14:59 )             36.1     04-08    134<L>  |  101  |  6<L>  ----------------------------<  92  4.6   |  25  |  0.81    Ca    9.4      2024 14:59    TPro  7.9  /  Alb  3.4  /  TBili  0.3  /  DBili  x   /  AST  13  /  ALT  11  /  AlkPhos  101  04-08      Urinalysis Basic - ( 2024 14:59 )    Color: Dark Yellow / Appearance: Clear / S.024 / pH: x  Gluc: 92 mg/dL / Ketone: 15 mg/dL  / Bili: Negative / Urobili: 1.0 mg/dL   Blood: x / Protein: Trace mg/dL / Nitrite: Negative   Leuk Esterase: Trace / RBC: 1 /HPF / WBC 1 /HPF   Sq Epi: x / Non Sq Epi: 2 /HPF / Bacteria: Negative /HPF        RADIOLOGY AND ADDITIONAL TESTS: Reviewed

## 2024-04-08 NOTE — H&P ADULT - NSHPPHYSICALEXAM_GEN_ALL_CORE
CONSTITUTIONAL: Awake, alert.  Nontoxic, no acute distress.  HEAD: Normocephalic, atraumatic.  EYES: Conjunctivae clear without exudates or hemorrhage. Sclera is non-icteric.  ENT: Normal appearing external ears, nose, mucous membranes moist.  NECK: supple, trachea midline.  HEART:  Normal rate, regular rhythm.    LUNGS:  No acute respiratory distress.  Non-tachypneic and non-labored.  ABDOMEN: abdomen is soft, minimally tender in all 4 quadrants, no rebound or guarding, nondistended. JOSE with no presence of active drainage of blood or purulence, able to only pass 5th digit through anal orifice.   MUSCULOSKELETAL:  Moving all extremities without issue.  SKIN: Skin in warm, dry and intact without rashes or lesions.  Appropriate color for ethnicity.  NEUROLOGICAL:  Patient is alert, oriented x person, place and time.  PSYCH: Appropriate mood and affect. Good judgment and insight.

## 2024-04-08 NOTE — ED PROVIDER NOTE - CLINICAL SUMMARY MEDICAL DECISION MAKING FREE TEXT BOX
34F PMHx Crohn's (previously on mesalamine & infliximab, not on any meds preparing for clinical trial) presents to ED for 1 week hx lower abdominal pain, constipation, decreased PO intake. Was seen by GI doctor last week in preparation for trial where she endorsed abd pain & abdominal XR showed large stool burden. Pt given trial of miralax which helped at first but pt states she has had not had a BM since Saturday 4/6. BM was non-bloody diarrhea. Pt still passing flatus. Pt had decreasing PO intake 2/2 nausea w one episode of nbnb emesis this AM. CBC/CMP, lactate, CTAP PO/IV constrast, GI/surg cs

## 2024-04-08 NOTE — ED PROVIDER NOTE - PHYSICAL EXAMINATION
T(C): 36.5 (04-08-24 @ 13:30), Max: 36.5 (04-08-24 @ 13:30)  HR: 117 (04-08-24 @ 13:30) (117 - 117)  BP: 115/78 (04-08-24 @ 13:30) (115/78 - 115/78)  RR: 16 (04-08-24 @ 13:30) (16 - 16)  SpO2: 100% (04-08-24 @ 13:30) (100% - 100%)    CONSTITUTIONAL: Well groomed, no apparent distress  EYES: PERRLA and symmetric, EOMI, No conjunctival or scleral injection, non-icteric  ENMT: Oral mucosa with moist membranes.   NECK: Supple, symmetric and without tracheal deviation   RESP: No respiratory distress, no use of accessory muscles  CV: RRR, no peripheral edema  GI: Soft, ND, TTP lower mid abdomen w voluntary guarding, mildly TTP epigastric region  NEURO: CN II-XII intact; no focal or motor deficits  EXTREMITIES: WWP, SCDs in place  PSYCH: Appropriate insight/judgment; A+O x 3, mood and affect appropriate, recent/remote memory intact T(C): 36.5 (04-08-24 @ 13:30), Max: 36.5 (04-08-24 @ 13:30)  HR: 117 (04-08-24 @ 13:30) (117 - 117)  BP: 115/78 (04-08-24 @ 13:30) (115/78 - 115/78)  RR: 16 (04-08-24 @ 13:30) (16 - 16)  SpO2: 100% (04-08-24 @ 13:30) (100% - 100%)    CONSTITUTIONAL: Well groomed, no apparent distress  EYES: PERRLA and symmetric, EOMI, No conjunctival or scleral injection, non-icteric  ENMT: Oral mucosa with moist membranes.   NECK: Supple, symmetric and without tracheal deviation   RESP: No respiratory distress, no use of accessory muscles  CV: RRR, no peripheral edema  GI: Soft, ND, TTP lower mid abdomen w voluntary guarding, mildly TTP epigastric region  NEURO: CN II-XII intact; no focal or motor deficits  EXTREMITIES: WWP, SCDs in place  PSYCH: Appropriate insight/judgment; A+O x 3, mood and affect appropriate, recent/remote memory intact    [place]: Abd: soft, mild b/l lower abd ttp, R>L, no rebound/guarding.   no LE edema. well appearing.

## 2024-04-08 NOTE — ED PROVIDER NOTE - ATTENDING CONTRIBUTION TO CARE
34F PMH stricturing and fistulizing Crohn's disease, p/w several days of worsening lower abd pain, nausea, NBNB emesis today, decreased BMs/constipated. Has been following w/ GI and surgery, GI referred for abd XR 4days ago, results c/w large stool burden. Outpt notes indicate that pt is scheduled to have procedure (pt states dilation) w/ Dr. Talamantes but that due to worsening symptoms, she was referred to ED. No other systemic symptoms.  Denies fevers, chills, black stool, bloody stool, dysuria, hematuria, urinary frequency, focal weakness, focal numbness, lightheadedness, back pain, SOB, CP, rhinorrhea, nasal congestion, sore throat, cough.  Mild tachycardia, other vitals wnl. Exam as above.   ddx: Likely related to Crohns - possible flare vs. stricture/obstructive process. Likely mild dehydration.   Labs, IVF/attempt symptom control, CT. Surgery and GI consulted.

## 2024-04-08 NOTE — ED ADULT NURSE NOTE - OBJECTIVE STATEMENT
34 y.o. Female c/o mid to lower abdominal pain, constipation, and decreased PO intake x1 week. Had nausea with vomiting nonbloody emesis this AM now resolved. GI Doctor tucker last week that with absominal XR that showed constipation took miralax but has not had BM since 4/6/24. +flatus, bowel movements were nonbloody. Denies CP, SOB, fevers/chills, dizziness, urinary complaints. P<Hx Crohn's. A/ox4 speaking in clear full sentences unlabored even respirations. Ambulatory steady independently.

## 2024-04-08 NOTE — ED PROVIDER NOTE - OBJECTIVE STATEMENT
34F PMHx Crohn's (previously on mesalamine & infliximab, not on any meds preparing for clinical trial) presents to ED for 1 week hx lower abdominal pain, constipation, decreased PO intake. Was seen by GI doctor last week in preparation for trial where she endorsed abd pain & abdominal XR showed large stool burden. Pt given trial of miralax which helped at first but pt states she has had not had a BM since Saturday 4/6. BM was non-bloody diarrhea. Pt still passing flatus. Pt had decreasing PO intake 2/2 nausea w one episode of nbnb emesis this AM. Pt denies HA, fevers/chills, CP, SOB, difficulty urination.

## 2024-04-09 LAB
ALBUMIN SERPL ELPH-MCNC: 3.3 G/DL — SIGNIFICANT CHANGE UP (ref 3.3–5)
ALP SERPL-CCNC: 98 U/L — SIGNIFICANT CHANGE UP (ref 40–120)
ALT FLD-CCNC: 10 U/L — SIGNIFICANT CHANGE UP (ref 10–45)
ANION GAP SERPL CALC-SCNC: 2 MMOL/L — LOW (ref 5–17)
APTT BLD: 31.2 SEC — SIGNIFICANT CHANGE UP (ref 24.5–35.6)
AST SERPL-CCNC: 13 U/L — SIGNIFICANT CHANGE UP (ref 10–40)
BASOPHILS # BLD AUTO: 0.04 K/UL — SIGNIFICANT CHANGE UP (ref 0–0.2)
BASOPHILS NFR BLD AUTO: 0.4 % — SIGNIFICANT CHANGE UP (ref 0–2)
BILIRUB SERPL-MCNC: 0.2 MG/DL — SIGNIFICANT CHANGE UP (ref 0.2–1.2)
BUN SERPL-MCNC: 4 MG/DL — LOW (ref 7–23)
CALCIUM SERPL-MCNC: 9.2 MG/DL — SIGNIFICANT CHANGE UP (ref 8.4–10.5)
CHLORIDE SERPL-SCNC: 102 MMOL/L — SIGNIFICANT CHANGE UP (ref 96–108)
CO2 SERPL-SCNC: 31 MMOL/L — SIGNIFICANT CHANGE UP (ref 22–31)
CREAT SERPL-MCNC: 0.76 MG/DL — SIGNIFICANT CHANGE UP (ref 0.5–1.3)
CRP SERPL-MCNC: 38 MG/L — HIGH (ref 0–4)
EGFR: 104 ML/MIN/1.73M2 — SIGNIFICANT CHANGE UP
EOSINOPHIL # BLD AUTO: 0.34 K/UL — SIGNIFICANT CHANGE UP (ref 0–0.5)
EOSINOPHIL NFR BLD AUTO: 3.2 % — SIGNIFICANT CHANGE UP (ref 0–6)
GLUCOSE SERPL-MCNC: 85 MG/DL — SIGNIFICANT CHANGE UP (ref 70–99)
HCT VFR BLD CALC: 36.6 % — SIGNIFICANT CHANGE UP (ref 34.5–45)
HGB BLD-MCNC: 11.7 G/DL — SIGNIFICANT CHANGE UP (ref 11.5–15.5)
IMM GRANULOCYTES NFR BLD AUTO: 0.4 % — SIGNIFICANT CHANGE UP (ref 0–0.9)
INR BLD: 1.17 — SIGNIFICANT CHANGE UP (ref 0.85–1.18)
LYMPHOCYTES # BLD AUTO: 2.73 K/UL — SIGNIFICANT CHANGE UP (ref 1–3.3)
LYMPHOCYTES # BLD AUTO: 25.7 % — SIGNIFICANT CHANGE UP (ref 13–44)
MAGNESIUM SERPL-MCNC: 2.1 MG/DL — SIGNIFICANT CHANGE UP (ref 1.6–2.6)
MCHC RBC-ENTMCNC: 27.8 PG — SIGNIFICANT CHANGE UP (ref 27–34)
MCHC RBC-ENTMCNC: 32 GM/DL — SIGNIFICANT CHANGE UP (ref 32–36)
MCV RBC AUTO: 86.9 FL — SIGNIFICANT CHANGE UP (ref 80–100)
MONOCYTES # BLD AUTO: 1.27 K/UL — HIGH (ref 0–0.9)
MONOCYTES NFR BLD AUTO: 12 % — SIGNIFICANT CHANGE UP (ref 2–14)
NEUTROPHILS # BLD AUTO: 6.19 K/UL — SIGNIFICANT CHANGE UP (ref 1.8–7.4)
NEUTROPHILS NFR BLD AUTO: 58.3 % — SIGNIFICANT CHANGE UP (ref 43–77)
NRBC # BLD: 0 /100 WBCS — SIGNIFICANT CHANGE UP (ref 0–0)
PHOSPHATE SERPL-MCNC: 3.5 MG/DL — SIGNIFICANT CHANGE UP (ref 2.5–4.5)
PLATELET # BLD AUTO: 448 K/UL — HIGH (ref 150–400)
POTASSIUM SERPL-MCNC: 4.1 MMOL/L — SIGNIFICANT CHANGE UP (ref 3.5–5.3)
POTASSIUM SERPL-SCNC: 4.1 MMOL/L — SIGNIFICANT CHANGE UP (ref 3.5–5.3)
PROT SERPL-MCNC: 8.1 G/DL — SIGNIFICANT CHANGE UP (ref 6–8.3)
PROTHROM AB SERPL-ACNC: 13.3 SEC — HIGH (ref 9.5–13)
RBC # BLD: 4.21 M/UL — SIGNIFICANT CHANGE UP (ref 3.8–5.2)
RBC # FLD: 13.1 % — SIGNIFICANT CHANGE UP (ref 10.3–14.5)
SODIUM SERPL-SCNC: 135 MMOL/L — SIGNIFICANT CHANGE UP (ref 135–145)
WBC # BLD: 10.61 K/UL — HIGH (ref 3.8–10.5)
WBC # FLD AUTO: 10.61 K/UL — HIGH (ref 3.8–10.5)

## 2024-04-09 PROCEDURE — 45330 DIAGNOSTIC SIGMOIDOSCOPY: CPT | Mod: GC

## 2024-04-09 RX ORDER — ACETAMINOPHEN 500 MG
650 TABLET ORAL EVERY 6 HOURS
Refills: 0 | Status: DISCONTINUED | OUTPATIENT
Start: 2024-04-09 | End: 2024-04-14

## 2024-04-09 RX ORDER — POLYETHYLENE GLYCOL 3350 17 G/17G
17 POWDER, FOR SOLUTION ORAL
Refills: 0 | Status: DISCONTINUED | OUTPATIENT
Start: 2024-04-09 | End: 2024-04-14

## 2024-04-09 RX ORDER — HEPARIN SODIUM 5000 [USP'U]/ML
5000 INJECTION INTRAVENOUS; SUBCUTANEOUS EVERY 8 HOURS
Refills: 0 | Status: DISCONTINUED | OUTPATIENT
Start: 2024-04-09 | End: 2024-04-14

## 2024-04-09 RX ORDER — ONDANSETRON 8 MG/1
4 TABLET, FILM COATED ORAL EVERY 6 HOURS
Refills: 0 | Status: DISCONTINUED | OUTPATIENT
Start: 2024-04-09 | End: 2024-04-14

## 2024-04-09 RX ORDER — INFLUENZA VIRUS VACCINE 15; 15; 15; 15 UG/.5ML; UG/.5ML; UG/.5ML; UG/.5ML
0.5 SUSPENSION INTRAMUSCULAR ONCE
Refills: 0 | Status: DISCONTINUED | OUTPATIENT
Start: 2024-04-09 | End: 2024-04-14

## 2024-04-09 RX ADMIN — POLYETHYLENE GLYCOL 3350 17 GRAM(S): 17 POWDER, FOR SOLUTION ORAL at 22:04

## 2024-04-09 RX ADMIN — Medication 650 MILLIGRAM(S): at 00:58

## 2024-04-09 RX ADMIN — Medication 650 MILLIGRAM(S): at 01:58

## 2024-04-09 NOTE — PROGRESS NOTE ADULT - ASSESSMENT
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 Now s/p EUA with rectal dilation and flex sig on 4/9.    Reg diet  Pain, nausea control PRN   Miralax TID  [place]  OOB/SCDs/ SQH   No AM labs

## 2024-04-09 NOTE — PROGRESS NOTE ADULT - SUBJECTIVE AND OBJECTIVE BOX
INTERVAL HPI/OVERNIGHT EVENTS: received 2 dulcolax, golytely started. +bm brown tinged     SUBJECTIVE: Patient seen and examined bedside with chief resident on AM rounds. She reports that her pain is improved from prior days. She has been drinking the golytely and she has been having liquid bowel movements that she describes as clear in color. Denies cp, sob, nausea, emesis, or fevers.    MEDICATIONS  (STANDING):  heparin   Injectable 5000 Unit(s) SubCutaneous every 8 hours  influenza   Vaccine 0.5 milliLiter(s) IntraMuscular once  lactated ringers. 1000 milliLiter(s) (115 mL/Hr) IV Continuous <Continuous>    MEDICATIONS  (PRN):  acetaminophen     Tablet .. 650 milliGRAM(s) Oral every 6 hours PRN Mild Pain (1 - 3)  bisacodyl 5 milliGRAM(s) Oral every 12 hours PRN Constipation  ondansetron Injectable 4 milliGRAM(s) IV Push every 6 hours PRN Nausea      Vital Signs Last 24 Hrs  T(C): 36.8 (09 Apr 2024 04:56), Max: 36.8 (08 Apr 2024 20:53)  T(F): 98.3 (09 Apr 2024 04:56), Max: 98.3 (08 Apr 2024 22:30)  HR: 85 (09 Apr 2024 04:56) (80 - 117)  BP: 101/66 (09 Apr 2024 04:56) (101/66 - 115/78)  BP(mean): --  RR: 17 (09 Apr 2024 04:56) (16 - 18)  SpO2: 98% (09 Apr 2024 04:56) (98% - 100%)    Parameters below as of 09 Apr 2024 04:56  Patient On (Oxygen Delivery Method): room air        PHYSICAL EXAM:  Constitutional: A&Ox3, resting comfortably in bed  Respiratory: non labored breathing, no respiratory distress  Cardiovascular: NSR, RRR  Gastrointestinal: Abdomen soft, NTND.  Genitourinary: voiding  Extremities: wwp, no calf tenderness or edema, +SCDs        I&O's Detail    08 Apr 2024 07:01  -  09 Apr 2024 07:00  --------------------------------------------------------  IN:    Lactated Ringers: 1035 mL  Total IN: 1035 mL    OUT:    Stool (mL): 400 mL    Voided (mL): 250 mL  Total OUT: 650 mL    Total NET: 385 mL        LABS:                        11.7   10.61 )-----------( 448      ( 09 Apr 2024 05:30 )             36.6     04-09    135  |  102  |  4<L>  ----------------------------<  85  4.1   |  31  |  0.76    Ca    9.2      09 Apr 2024 05:30  Phos  3.5     04-09  Mg     2.1     04-09    TPro  8.1  /  Alb  3.3  /  TBili  0.2  /  DBili  x   /  AST  13  /  ALT  10  /  AlkPhos  98  04-09    PT/INR - ( 09 Apr 2024 05:30 )   PT: 13.3 sec;   INR: 1.17          PTT - ( 09 Apr 2024 05:30 )  PTT:31.2 sec  Urinalysis Basic - ( 09 Apr 2024 05:30 )    Color: x / Appearance: x / SG: x / pH: x  Gluc: 85 mg/dL / Ketone: x  / Bili: x / Urobili: x   Blood: x / Protein: x / Nitrite: x   Leuk Esterase: x / RBC: x / WBC x   Sq Epi: x / Non Sq Epi: x / Bacteria: x        RADIOLOGY & ADDITIONAL STUDIES:

## 2024-04-09 NOTE — PROGRESS NOTE ADULT - ASSESSMENT
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 She presented with 1 week of worsening lower abdominal pain, constipation and decreased PO intake, incomplete emptying when having bowel movements. Admitted for conservative management of obstructive symptoms with an aggressive bowel regimen.    Added on to OR for afternooon for Sigmoidoscopy and stricture dilation  CLD/NPO by 10AM/IVF resuscitation   2 dulcolax/ Golytely solution  Pain, nausea control PRN   [place]  OOB/SCDs/ SQH   AM labs

## 2024-04-09 NOTE — PRE-ANESTHESIA EVALUATION ADULT - HEIGHT IN FEET
Verbal - The patient responds to verbal stimuli by opening their eyes when someone speaks to them. The patient is not fully oriented to time, place, or person. 5

## 2024-04-09 NOTE — BRIEF OPERATIVE NOTE - OPERATION/FINDINGS
Lithotomy position. JOSE with friable, stenotic tissue of anus and distal rectum. Rectal dilators inserted up to size 58Fr. Flexible sigmoidoscopy then performed with friable inflamed rectal tissue consistent with proctitis. Scope inserted to 60cm mild inflammation of colonic tissue, no obstructions appreciated.

## 2024-04-09 NOTE — PROGRESS NOTE ADULT - SUBJECTIVE AND OBJECTIVE BOX
POST-OPERATIVE NOTE    Procedure: Rectal EUA    Diagnosis/Indication: Crohn's disease; rectal stricture    Surgeon: Dr. Talamantes     S: Patient resting comfortably and in no acute distress. Admit to some blood per rectum but denies any fecal incontinence.  Denies CP, SOB, N/V. Pain controlled with medication. Tolerating regular diet.     O:  T(C): 37.1 (04-09-24 @ 19:14), Max: 37.1 (04-09-24 @ 19:14)  T(F): 98.7 (04-09-24 @ 19:14), Max: 98.7 (04-09-24 @ 19:14)  HR: 90 (04-09-24 @ 19:14) (87 - 105)  BP: 102/67 (04-09-24 @ 19:14) (102/67 - 115/67)  RR: 17 (04-09-24 @ 19:14) (17 - 23)  SpO2: 98% (04-09-24 @ 19:14) (98% - 100%)  Wt(kg): --                        11.7   10.61 )-----------( 448      ( 09 Apr 2024 05:30 )             36.6     04-09    135  |  102  |  4<L>  ----------------------------<  85  4.1   |  31  |  0.76    Ca    9.2      09 Apr 2024 05:30  Phos  3.5     04-09  Mg     2.1     04-09    TPro  8.1  /  Alb  3.3  /  TBili  0.2  /  DBili  x   /  AST  13  /  ALT  10  /  AlkPhos  98  04-09      Gen: NAD, resting comfortably in bed  C/V: NSR  Pulm: Nonlabored breathing, no respiratory distress  Abd: soft, NT/ND  Extrem: WWP, (-) edema.       A/P: 34yFemale s/p above procedure  Diet:  IVF:  Pain/nausea control

## 2024-04-09 NOTE — PATIENT PROFILE ADULT - FALL HARM RISK - UNIVERSAL INTERVENTIONS
Bed in lowest position, wheels locked, appropriate side rails in place/Call bell, personal items and telephone in reach/Instruct patient to call for assistance before getting out of bed or chair/Non-slip footwear when patient is out of bed/[place] to call system/Physically safe environment - no spills, clutter or unnecessary equipment/Purposeful Proactive Rounding/Room/bathroom lighting operational, light cord in reach

## 2024-04-09 NOTE — CONSULT NOTE ADULT - ASSESSMENT
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 Admitted to surgical service for rectal stricture dilation with Dr. Talamantes.     Patient reports that she feels much better after consuming Golytely bowel prep and having mulitiple episodes of diarrhea.       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 Admitted to surgical service for rectal stricture dilation with Dr. Talamantes.     Patient reports that she feels much better after consuming Golytely bowel prep and having multiple episodes of diarrhea overnight. Planned for sigmoidoscopy and stricture dilation today with surgery.     Recommendations:   - will follow-up results of stricture dilation today in OR   - patient is currently NPO for procedure   - f/u fecal calprotectin and CRP 38   - no indication for steroids at this times as symptoms most likely 2/2 to known stricture as opposed to Crohn's disease flare   - pain control and IVF as per surgery team     Case discussed with Dr. Wu and IBD Team. GI Team will continue to follow.     Gisella Quigley D.O.   Gastroenterology Fellow  Weekday 7am-5pm Pager: [phone]  Weeknights/Weekend/Holiday Coverage: Please call the  for contact info.

## 2024-04-10 ENCOUNTER — TRANSCRIPTION ENCOUNTER (OUTPATIENT)
Age: 34
End: 2024-04-10

## 2024-04-10 LAB
APPEARANCE UR: CLEAR — SIGNIFICANT CHANGE UP
BACTERIA # UR AUTO: NEGATIVE /HPF — SIGNIFICANT CHANGE UP
BILIRUB UR-MCNC: NEGATIVE — SIGNIFICANT CHANGE UP
COLOR SPEC: YELLOW — SIGNIFICANT CHANGE UP
CULTURE RESULTS: SIGNIFICANT CHANGE UP
DIFF PNL FLD: NEGATIVE — SIGNIFICANT CHANGE UP
GLUCOSE UR QL: NEGATIVE MG/DL — SIGNIFICANT CHANGE UP
KETONES UR-MCNC: 15 MG/DL
LEUKOCYTE ESTERASE UR-ACNC: ABNORMAL
NITRITE UR-MCNC: NEGATIVE — SIGNIFICANT CHANGE UP
PH UR: 8 — SIGNIFICANT CHANGE UP (ref 5–8)
PROT UR-MCNC: NEGATIVE MG/DL — SIGNIFICANT CHANGE UP
RBC CASTS # UR COMP ASSIST: 0 /HPF — SIGNIFICANT CHANGE UP (ref 0–4)
SP GR SPEC: 1.01 — SIGNIFICANT CHANGE UP (ref 1–1.03)
SPECIMEN SOURCE: SIGNIFICANT CHANGE UP
SQUAMOUS # UR AUTO: 2 /HPF — SIGNIFICANT CHANGE UP (ref 0–5)
UROBILINOGEN FLD QL: 0.2 MG/DL — SIGNIFICANT CHANGE UP (ref 0.2–1)
WBC UR QL: 7 /HPF — HIGH (ref 0–5)

## 2024-04-10 PROCEDURE — 71045 X-RAY EXAM CHEST 1 VIEW: CPT | Mod: 26

## 2024-04-10 RX ORDER — SODIUM CHLORIDE 9 MG/ML
1000 INJECTION, SOLUTION INTRAVENOUS ONCE
Refills: 0 | Status: COMPLETED | OUTPATIENT
Start: 2024-04-10 | End: 2024-04-10

## 2024-04-10 RX ORDER — METRONIDAZOLE 500 MG
500 TABLET ORAL EVERY 8 HOURS
Refills: 0 | Status: DISCONTINUED | OUTPATIENT
Start: 2024-04-10 | End: 2024-04-11

## 2024-04-10 RX ORDER — CEFTRIAXONE 500 MG/1
1000 INJECTION, POWDER, FOR SOLUTION INTRAMUSCULAR; INTRAVENOUS EVERY 24 HOURS
Refills: 0 | Status: DISCONTINUED | OUTPATIENT
Start: 2024-04-10 | End: 2024-04-11

## 2024-04-10 RX ORDER — ACETAMINOPHEN 500 MG
650 TABLET ORAL ONCE
Refills: 0 | Status: COMPLETED | OUTPATIENT
Start: 2024-04-10 | End: 2024-04-10

## 2024-04-10 RX ORDER — MESALAMINE 400 MG
1 TABLET, DELAYED RELEASE (ENTERIC COATED) ORAL
Qty: 30 | Refills: 0
Start: 2024-04-10 | End: 2024-05-09

## 2024-04-10 RX ADMIN — POLYETHYLENE GLYCOL 3350 17 GRAM(S): 17 POWDER, FOR SOLUTION ORAL at 13:48

## 2024-04-10 RX ADMIN — Medication 100 MILLIGRAM(S): at 19:10

## 2024-04-10 RX ADMIN — Medication 650 MILLIGRAM(S): at 06:18

## 2024-04-10 RX ADMIN — POLYETHYLENE GLYCOL 3350 17 GRAM(S): 17 POWDER, FOR SOLUTION ORAL at 06:19

## 2024-04-10 RX ADMIN — CEFTRIAXONE 100 MILLIGRAM(S): 500 INJECTION, POWDER, FOR SOLUTION INTRAMUSCULAR; INTRAVENOUS at 18:37

## 2024-04-10 RX ADMIN — Medication 650 MILLIGRAM(S): at 18:02

## 2024-04-10 RX ADMIN — Medication 650 MILLIGRAM(S): at 07:18

## 2024-04-10 RX ADMIN — POLYETHYLENE GLYCOL 3350 17 GRAM(S): 17 POWDER, FOR SOLUTION ORAL at 22:12

## 2024-04-10 RX ADMIN — SODIUM CHLORIDE 1000 MILLILITER(S): 9 INJECTION, SOLUTION INTRAVENOUS at 16:54

## 2024-04-10 NOTE — DISCHARGE NOTE PROVIDER - NSDCFUADDINST_GEN_ALL_CORE_FT
You have been prescribed Canasa, take 1 suppository at bedtime. You should continue the steroids prescribed to you by Dr. Wu. Continue all other home medication as prescribed.    Warning Signs:  Please call your doctor or nurse practitioner if you experience the following:  *You experience new chest pain, pressure, squeezing or tightness.  *New or worsening cough, shortness of breath, or wheeze.  *If you are vomiting and cannot keep down fluids or your medications.  *You are getting dehydrated due to continued vomiting, diarrhea, or other reasons. Signs of dehydration include dry mouth, rapid heartbeat, or feeling dizzy or faint when standing.  *You see blood or dark/black material when you vomit or have a bowel movement.  *You experience burning when you urinate, have blood in your urine, or experience a discharge.  *Your pain is not improving within 8-12 hours or is not gone within 24 hours. Call or return immediately if your pain is getting worse, changes location, or moves to your chest or back.  *You have shaking chills, or fever greater than 101.5 degrees Fahrenheit or 38 degrees Celsius.  *Any change in your symptoms, or any new symptoms that concern you.

## 2024-04-10 NOTE — DISCHARGE NOTE PROVIDER - CARE PROVIDER_API CALL
Chris Talamantes  Surgery  [facility]  [place] [address]  Phone: [phone]  Fax: [phone]  Follow Up Time: 1 week    Raciel Wu  Gastroenterology  [address]  [place] [address]  Phone: [phone]  Fax: [phone]  Follow Up Time: 1 week

## 2024-04-10 NOTE — DISCHARGE NOTE PROVIDER - PROVIDER TOKENS
PROVIDER:[TOKEN:[[id number]],FOLLOWUP:[1 week]],PROVIDER:[TOKEN:[[id number]],FOLLOWUP:[1 week]]

## 2024-04-10 NOTE — DISCHARGE NOTE PROVIDER - NSDCCPCAREPLAN_GEN_ALL_CORE_FT
PRINCIPAL DISCHARGE DIAGNOSIS  Diagnosis: Abdominal pain  Assessment and Plan of Treatment:       SECONDARY DISCHARGE DIAGNOSES  Diagnosis: Crohn disease  Assessment and Plan of Treatment:

## 2024-04-10 NOTE — DISCHARGE NOTE PROVIDER - CARE PROVIDERS DIRECT ADDRESSES
[email or web address]

## 2024-04-10 NOTE — DISCHARGE NOTE PROVIDER - NSDCFUADDAPPT_GEN_ALL_CORE_FT
Please follow up with Dr. Talamantes in 1-2 weeks, you may call [phone] to schedule an appointment. Please follow up with Dr. Talamantes in 1-2 weeks, you may call [phone] to schedule an appointment.    Please follow up with Dr. Wu, you may call [phone] to schedule an appointment and schedule a colonoscopy.

## 2024-04-10 NOTE — PROGRESS NOTE ADULT - ASSESSMENT
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 Now s/p EUA with rectal dilation and flex sig on 4/9.    Reg diet  Pain, nausea control PRN   Miralax TID  OOB/SCDs/ SQH   No AM labs   Discharge today with Canasa and continuation of steroid treatment

## 2024-04-10 NOTE — DISCHARGE NOTE PROVIDER - NSDCFUSCHEDAPPT_GEN_ALL_CORE_FT
Raciel Wu  [facility] PreAdmits  Scheduled Appointment: 04/16/2024    [facility]  GASTRO  [address]  Scheduled Appointment: 04/16/2024    [facility]  COLOSURG [address]  Scheduled Appointment: 05/10/2024     Raciel Wu  [facility] PreAdmits  Scheduled Appointment: 04/16/2024    Raciel Wu  [facility]  GASTRO  [address]  Scheduled Appointment: 04/16/2024    Christen Winters  [facility]  GASTRO [address]  Scheduled Appointment: 04/25/2024    Chris Talamantes  [facility]  COLOSURG [address]  Scheduled Appointment: 05/10/2024

## 2024-04-10 NOTE — DISCHARGE NOTE PROVIDER - HOSPITAL COURSE
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 The patient was seen by GI (Dr. Wu) last Thursday for increasing abdominal pain, was prescribed steroid course (prednisone 10mg PO) , however AXR demonstrated large stool burden. Was recommending to come to ED on Friday for rectal dilation, however patient opted for noninteventional management at that time. She was recommended to take Miralax and laxatives for BMs. She states she was having loose BMs on Saturday, similar in consistency to when she took prep for colonoscopy, however no BMs since Saturday. She continues to pass flatus and tolerating liquids (as was suggested by GI) had one episode of nbnb emesis this AM, but tolerated dinner last night. Of note, patient saw Dr. Talamantes in 02/24 and was advised to continue with medical management of her Crohn's, but that if she were to get worsening signs of obstruction she would have to be newly evaluated with possible diversion/ protectomy. In the ED, the patient is afebrile, tachycardic to 117, normotensive, and satting well on RA. On exam abdomen is soft, minimally tender in RUQ, nondistended. JOSE with no presence of active drainage of blood or purulence, able to only pass 5th digit through anal orifice, labs with WBC 11.42, no left shift. BMP with Na 134, BUN/Cr 6/.81 UA with trace LE and ketones. CTAP shows an approximately 3-4 severe segment of wall thickening with probable mild active inflammation of the distal/terminal ileum extending to the ileocecal valve. No evidence of stricturing disease. No bowel obstruction.There is mild wall thickening of the rectosigmoid colon, which could be due to underdistention or colitis. Possible subtle residual collapsed rectovaginal fistula, better evaluated on the comparison MRI and small persistent right-sided perianal fistula extending inferiorly to the right medial gluteal skin. Malpositioned intrauterine device rotated greater than 90 degrees with stem oriented toward the right cornua. The patient was admitted on 4/8 and bowel prep was started with dulcolax and golytely. On 4/9 she underwent EUA with rectal dilation and flexible sig; she progressed through her chiara-operative period without complication, her post-op assessment was within normal limits, she tolerated her regular diet, and on day of discharge she was stable for discharge home. 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 The patient was seen by GI (Dr. Wu) last Thursday for increasing abdominal pain, was prescribed steroid course (prednisone 10mg PO) , however AXR demonstrated large stool burden. Was recommending to come to ED on Friday for rectal dilation, however patient opted for noninteventional management at that time. She was recommended to take Miralax and laxatives for BMs. She states she was having loose BMs on Saturday, similar in consistency to when she took prep for colonoscopy, however no BMs since Saturday. She continues to pass flatus and tolerating liquids (as was suggested by GI) had one episode of nbnb emesis this AM, but tolerated dinner last night. Of note, patient saw Dr. Talamantes in 02/24 and was advised to continue with medical management of her Crohn's, but that if she were to get worsening signs of obstruction she would have to be newly evaluated with possible diversion/ protectomy. In the ED, the patient is afebrile, tachycardic to 117, normotensive, and satting well on RA. On exam abdomen is soft, minimally tender in RUQ, nondistended. JOSE with no presence of active drainage of blood or purulence, able to only pass 5th digit through anal orifice, labs with WBC 11.42, no left shift. BMP with Na 134, BUN/Cr 6/.81 UA with trace LE and ketones. CTAP shows an approximately 3-4 severe segment of wall thickening with probable mild active inflammation of the distal/terminal ileum extending to the ileocecal valve. No evidence of stricturing disease. No bowel obstruction.There is mild wall thickening of the rectosigmoid colon, which could be due to underdistention or colitis. Possible subtle residual collapsed rectovaginal fistula, better evaluated on the comparison MRI and small persistent right-sided perianal fistula extending inferiorly to the right medial gluteal skin. Malpositioned intrauterine device rotated greater than 90 degrees with stem oriented toward the right cornua. The patient was admitted on 4/8 and bowel prep was started with dulcolax and golytely. On 4/9 she underwent EUA with rectal dilation and flexible sig; she tolerated the procedure well and her post-operative assessment was within normal limits. On post-operative day 1 she was tachycardic and febrile to 101F, she was started on ceftriaxone and flagyl. A fever work up was performed, her blood cultures, urinalysis, chest x-ray were negative. Infectious disease was consulted who recommended discontinuing ceftriaxone/flagyl, starting zosyn, and giving one dose of vancomycin. RVP was ordered and was negative. On 4/12 she was experiencing nausea with multiple episodes of spit up. She was experiencing tenderness in the right upper quadrant, ultrasound was performed without acute cholecystitis. Hepatitis panel was ordered which was negative. ID suggested that her fevers were likely due to zosyn, so her antibiotics were discontinued and her fevers resolved. On day of discharge the patient was tolerating her diet, her pain was well controlled, and she was stable for discharge home with close follow up.

## 2024-04-10 NOTE — DISCHARGE NOTE PROVIDER - DETAILS OF MALNUTRITION DIAGNOSIS/DIAGNOSES
This patient has been assessed with a concern for Malnutrition and was treated during this hospitalization for the following Nutrition diagnosis/diagnoses:     -  04/12/2024: Moderate protein-calorie malnutrition

## 2024-04-10 NOTE — DISCHARGE NOTE PROVIDER - NSDCMRMEDTOKEN_GEN_ALL_CORE_FT
Canasa 1000 mg rectal suppository: 1 suppository(ies) rectally once a day (at bedtime) MDD: 1 suppository   Canasa 1000 mg rectal suppository: 1 suppository(ies) rectally once a day MDD: 1 suppository

## 2024-04-10 NOTE — DISCHARGE NOTE PROVIDER - NSDCACTIVITY_GEN_ALL_CORE
Stairs allowed/Walking - Indoors allowed/Walking - Outdoors allowed/Follow Instructions Provided by your Surgical Team Showering allowed/Stairs allowed/Walking - Indoors allowed/Walking - Outdoors allowed/Follow Instructions Provided by your Surgical Team

## 2024-04-10 NOTE — PROGRESS NOTE ADULT - SUBJECTIVE AND OBJECTIVE BOX
INTERVAL HPI/OVERNIGHT EVENTS: poc wnl. caty reg diet. no acute complaints.     STATUS POST:  EUA, rectal dilation, and flex sig    POST OPERATIVE DAY #: 1    SUBJECTIVE: Patient seen and examined at bedside with chief resident. Patient without any acute complaint this morning. She is tolerating her diet without nausea or vomiting and her pain is well controlled.       heparin   Injectable 5000 Unit(s) SubCutaneous every 8 hours      Vital Signs Last 24 Hrs  T(C): 37.4 (10 Apr 2024 08:10), Max: 37.4 (10 Apr 2024 08:10)  T(F): 99.4 (10 Apr 2024 08:10), Max: 99.4 (10 Apr 2024 08:10)  HR: 104 (10 Apr 2024 09:34) (87 - 111)  BP: 92/59 (10 Apr 2024 09:34) (92/59 - 115/67)  BP(mean): 70 (10 Apr 2024 00:45) (70 - 88)  RR: 17 (10 Apr 2024 08:10) (16 - 23)  SpO2: 96% (10 Apr 2024 08:10) (95% - 100%)    Parameters below as of 10 Apr 2024 08:10  Patient On (Oxygen Delivery Method): room air      I&O's Detail    09 Apr 2024 07:01  -  10 Apr 2024 07:00  --------------------------------------------------------  IN:    Lactated Ringers: 920 mL  Total IN: 920 mL    OUT:    Stool (mL): 2 mL    Voided (mL): 700 mL  Total OUT: 702 mL    Total NET: 218 mL          General: NAD, resting comfortably in bed  C/V: NSR  Pulm: Nonlabored breathing, no respiratory distress  Abd: soft, NT/ND.  Extrem: WWP, no edema, SCDs in place        LABS:                        11.7   10.61 )-----------( 448      ( 09 Apr 2024 05:30 )             36.6     04-09    135  |  102  |  4<L>  ----------------------------<  85  4.1   |  31  |  0.76    Ca    9.2      09 Apr 2024 05:30  Phos  3.5     04-09  Mg     2.1     04-09    TPro  8.1  /  Alb  3.3  /  TBili  0.2  /  DBili  x   /  AST  13  /  ALT  10  /  AlkPhos  98  04-09    PT/INR - ( 09 Apr 2024 05:30 )   PT: 13.3 sec;   INR: 1.17          PTT - ( 09 Apr 2024 05:30 )  PTT:31.2 sec  Urinalysis Basic - ( 09 Apr 2024 05:30 )    Color: x / Appearance: x / SG: x / pH: x  Gluc: 85 mg/dL / Ketone: x  / Bili: x / Urobili: x   Blood: x / Protein: x / Nitrite: x   Leuk Esterase: x / RBC: x / WBC x   Sq Epi: x / Non Sq Epi: x / Bacteria: x        RADIOLOGY & ADDITIONAL STUDIES:

## 2024-04-11 PROBLEM — K50.90 CROHN'S DISEASE, UNSPECIFIED, WITHOUT COMPLICATIONS: Chronic | Status: ACTIVE | Noted: 2024-04-08

## 2024-04-11 LAB
ANION GAP SERPL CALC-SCNC: 10 MMOL/L — SIGNIFICANT CHANGE UP (ref 5–17)
BUN SERPL-MCNC: 3 MG/DL — LOW (ref 7–23)
CALCIUM SERPL-MCNC: 9 MG/DL — SIGNIFICANT CHANGE UP (ref 8.4–10.5)
CHLORIDE SERPL-SCNC: 100 MMOL/L — SIGNIFICANT CHANGE UP (ref 96–108)
CO2 SERPL-SCNC: 24 MMOL/L — SIGNIFICANT CHANGE UP (ref 22–31)
CREAT SERPL-MCNC: 0.78 MG/DL — SIGNIFICANT CHANGE UP (ref 0.5–1.3)
CULTURE RESULTS: SIGNIFICANT CHANGE UP
EGFR: 101 ML/MIN/1.73M2 — SIGNIFICANT CHANGE UP
GLUCOSE SERPL-MCNC: 111 MG/DL — HIGH (ref 70–99)
HCT VFR BLD CALC: 34 % — LOW (ref 34.5–45)
HGB BLD-MCNC: 11.2 G/DL — LOW (ref 11.5–15.5)
MAGNESIUM SERPL-MCNC: 1.8 MG/DL — SIGNIFICANT CHANGE UP (ref 1.6–2.6)
MCHC RBC-ENTMCNC: 27.6 PG — SIGNIFICANT CHANGE UP (ref 27–34)
MCHC RBC-ENTMCNC: 32.9 GM/DL — SIGNIFICANT CHANGE UP (ref 32–36)
MCV RBC AUTO: 83.7 FL — SIGNIFICANT CHANGE UP (ref 80–100)
NRBC # BLD: 0 /100 WBCS — SIGNIFICANT CHANGE UP (ref 0–0)
PHOSPHATE SERPL-MCNC: 2.4 MG/DL — LOW (ref 2.5–4.5)
PLATELET # BLD AUTO: 372 K/UL — SIGNIFICANT CHANGE UP (ref 150–400)
POTASSIUM SERPL-MCNC: 3.9 MMOL/L — SIGNIFICANT CHANGE UP (ref 3.5–5.3)
POTASSIUM SERPL-SCNC: 3.9 MMOL/L — SIGNIFICANT CHANGE UP (ref 3.5–5.3)
RAPID RVP RESULT: SIGNIFICANT CHANGE UP
RBC # BLD: 4.06 M/UL — SIGNIFICANT CHANGE UP (ref 3.8–5.2)
RBC # FLD: 12.8 % — SIGNIFICANT CHANGE UP (ref 10.3–14.5)
SARS-COV-2 RNA SPEC QL NAA+PROBE: SIGNIFICANT CHANGE UP
SODIUM SERPL-SCNC: 134 MMOL/L — LOW (ref 135–145)
SPECIMEN SOURCE: SIGNIFICANT CHANGE UP
WBC # BLD: 9.14 K/UL — SIGNIFICANT CHANGE UP (ref 3.8–10.5)
WBC # FLD AUTO: 9.14 K/UL — SIGNIFICANT CHANGE UP (ref 3.8–10.5)

## 2024-04-11 PROCEDURE — 99232 SBSQ HOSP IP/OBS MODERATE 35: CPT | Mod: GC

## 2024-04-11 PROCEDURE — 74177 CT ABD & PELVIS W/CONTRAST: CPT | Mod: 26

## 2024-04-11 RX ORDER — ACETAMINOPHEN 500 MG
1000 TABLET ORAL ONCE
Refills: 0 | Status: DISCONTINUED | OUTPATIENT
Start: 2024-04-11 | End: 2024-04-11

## 2024-04-11 RX ORDER — PIPERACILLIN AND TAZOBACTAM 4; .5 G/20ML; G/20ML
4.5 INJECTION, POWDER, LYOPHILIZED, FOR SOLUTION INTRAVENOUS ONCE
Refills: 0 | Status: COMPLETED | OUTPATIENT
Start: 2024-04-11 | End: 2024-04-11

## 2024-04-11 RX ORDER — PIPERACILLIN AND TAZOBACTAM 4; .5 G/20ML; G/20ML
4.5 INJECTION, POWDER, LYOPHILIZED, FOR SOLUTION INTRAVENOUS EVERY 8 HOURS
Refills: 0 | Status: DISCONTINUED | OUTPATIENT
Start: 2024-04-11 | End: 2024-04-11

## 2024-04-11 RX ORDER — SODIUM CHLORIDE 9 MG/ML
500 INJECTION, SOLUTION INTRAVENOUS ONCE
Refills: 0 | Status: COMPLETED | OUTPATIENT
Start: 2024-04-11 | End: 2024-04-11

## 2024-04-11 RX ORDER — ACETAMINOPHEN 500 MG
1000 TABLET ORAL ONCE
Refills: 0 | Status: COMPLETED | OUTPATIENT
Start: 2024-04-11 | End: 2024-04-11

## 2024-04-11 RX ORDER — ONDANSETRON 8 MG/1
4 TABLET, FILM COATED ORAL ONCE
Refills: 0 | Status: COMPLETED | OUTPATIENT
Start: 2024-04-11 | End: 2024-04-11

## 2024-04-11 RX ORDER — MAGNESIUM SULFATE 500 MG/ML
1 VIAL (ML) INJECTION ONCE
Refills: 0 | Status: COMPLETED | OUTPATIENT
Start: 2024-04-11 | End: 2024-04-11

## 2024-04-11 RX ORDER — VANCOMYCIN HCL 1 G
1250 VIAL (EA) INTRAVENOUS EVERY 12 HOURS
Refills: 0 | Status: COMPLETED | OUTPATIENT
Start: 2024-04-11 | End: 2024-04-11

## 2024-04-11 RX ORDER — PIPERACILLIN AND TAZOBACTAM 4; .5 G/20ML; G/20ML
4.5 INJECTION, POWDER, LYOPHILIZED, FOR SOLUTION INTRAVENOUS EVERY 8 HOURS
Refills: 0 | Status: DISCONTINUED | OUTPATIENT
Start: 2024-04-12 | End: 2024-04-13

## 2024-04-11 RX ADMIN — ONDANSETRON 4 MILLIGRAM(S): 8 TABLET, FILM COATED ORAL at 16:00

## 2024-04-11 RX ADMIN — ONDANSETRON 4 MILLIGRAM(S): 8 TABLET, FILM COATED ORAL at 18:24

## 2024-04-11 RX ADMIN — PIPERACILLIN AND TAZOBACTAM 25 GRAM(S): 4; .5 INJECTION, POWDER, LYOPHILIZED, FOR SOLUTION INTRAVENOUS at 23:02

## 2024-04-11 RX ADMIN — Medication 650 MILLIGRAM(S): at 01:01

## 2024-04-11 RX ADMIN — PIPERACILLIN AND TAZOBACTAM 200 GRAM(S): 4; .5 INJECTION, POWDER, LYOPHILIZED, FOR SOLUTION INTRAVENOUS at 19:10

## 2024-04-11 RX ADMIN — SODIUM CHLORIDE 1000 MILLILITER(S): 9 INJECTION, SOLUTION INTRAVENOUS at 01:55

## 2024-04-11 RX ADMIN — ONDANSETRON 4 MILLIGRAM(S): 8 TABLET, FILM COATED ORAL at 09:29

## 2024-04-11 RX ADMIN — Medication 400 MILLIGRAM(S): at 23:58

## 2024-04-11 RX ADMIN — Medication 100 GRAM(S): at 10:58

## 2024-04-11 RX ADMIN — POLYETHYLENE GLYCOL 3350 17 GRAM(S): 17 POWDER, FOR SOLUTION ORAL at 21:10

## 2024-04-11 RX ADMIN — Medication 1000 MILLIGRAM(S): at 16:00

## 2024-04-11 RX ADMIN — Medication 62.5 MILLIMOLE(S): at 15:02

## 2024-04-11 RX ADMIN — Medication 100 MILLIGRAM(S): at 03:29

## 2024-04-11 RX ADMIN — Medication 100 MILLIGRAM(S): at 13:39

## 2024-04-11 RX ADMIN — POLYETHYLENE GLYCOL 3350 17 GRAM(S): 17 POWDER, FOR SOLUTION ORAL at 05:03

## 2024-04-11 RX ADMIN — Medication 650 MILLIGRAM(S): at 00:01

## 2024-04-11 RX ADMIN — Medication 166.67 MILLIGRAM(S): at 21:09

## 2024-04-11 RX ADMIN — Medication 400 MILLIGRAM(S): at 09:17

## 2024-04-11 NOTE — PROGRESS NOTE ADULT - ASSESSMENT
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 Admitted to surgical service for rectal stricture dilation with Dr. Talamantes.     Underwent dilation of rectal stricture with colorectal surgery on 04/09. Was dilated to the size of 58Fr. Febrile to 102 on 04/10, but UA, blood cultures, and CXR negative.     Recommendations:   - f/u fecal calprotectin and CRP this admission 38   - agree with ceftriaxone and flagyl given fever to 102   - tolerating regular diet without issues   - has a planned date for colonoscopy with Dr. Wu     GI Team will continue to follow.     Gisella Quigley D.O.   Gastroenterology Fellow  Weekday 7am-5pm Pager: [phone]  Weeknights/Weekend/Holiday Coverage: Please call the  for contact info. 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 Admitted to surgical service for rectal stricture dilation with Dr. Talamantes.     Underwent dilation of rectal stricture with colorectal surgery on 04/09. Was dilated to the size of 58Fr. Febrile to 102 on 04/10, but UA, blood cultures, and CXR negative.     Recommendations:   - f/u fecal calprotectin and CRP this admission 38   - agree with ceftriaxone and flagyl given fever to 102   - tolerating regular diet without issues   - has a planned date for colonoscopy with Dr. Wu   - f/u CT abdomen/pelvis with IV contrast     GI Team will continue to follow.     Gisella Quigley D.O.   Gastroenterology Fellow  Weekday 7am-5pm Pager: [phone]  Weeknights/Weekend/Holiday Coverage: Please call the  for contact info.

## 2024-04-11 NOTE — PROGRESS NOTE ADULT - SUBJECTIVE AND OBJECTIVE BOX
INTERVAL HPI/OVERNIGHT EVENTS: UA neg, CXR clear lungs, tachy to 100-109s, soft BPs, remains afebrile, missed voids, refused AM heparin, 500cc LR bolus    STATUS POST: EUA, rectal dilation, and flex sig    POST OPERATIVE DAY #: 2    SUBJECTIVE: Patient seen and examined at bedside with chief resident. Patient states that she is feeling well, her only complaint this morning is fatigue. She denies abdominal pain, rectal pain, or rectal bleeding. She endorses passing flatus and having a nonbloody bowel movement overnight.      cefTRIAXone   IVPB 1000 milliGRAM(s) IV Intermittent every 24 hours  heparin   Injectable 5000 Unit(s) SubCutaneous every 8 hours  metroNIDAZOLE  IVPB 500 milliGRAM(s) IV Intermittent every 8 hours      Vital Signs Last 24 Hrs  T(C): 37.1 (11 Apr 2024 04:41), Max: 38.3 (10 Apr 2024 17:26)  T(F): 98.8 (11 Apr 2024 04:41), Max: 101 (10 Apr 2024 17:26)  HR: 100 (11 Apr 2024 04:41) (100 - 115)  BP: 101/66 (11 Apr 2024 04:41) (90/57 - 113/70)  BP(mean): --  RR: 17 (11 Apr 2024 04:41) (17 - 18)  SpO2: 99% (11 Apr 2024 04:41) (95% - 99%)    Parameters below as of 11 Apr 2024 04:41  Patient On (Oxygen Delivery Method): room air      I&O's Detail    10 Apr 2024 07:01  -  11 Apr 2024 07:00  --------------------------------------------------------  IN:  Total IN: 0 mL    OUT:    Voided (mL): 400 mL  Total OUT: 400 mL    Total NET: -400 mL          General: NAD, resting comfortably in bed  C/V: NSR  Pulm: Nonlabored breathing, no respiratory distress  Abd: soft, NT/ND.  Extrem: WWP, no edema, SCDs in place        LABS:                        11.2   9.14  )-----------( 372      ( 11 Apr 2024 07:54 )             34.0     04-11    134<L>  |  100  |  3<L>  ----------------------------<  111<H>  3.9   |  24  |  0.78    Ca    9.0      11 Apr 2024 07:54  Phos  2.4     04-11  Mg     1.8     04-11        Urinalysis Basic - ( 11 Apr 2024 07:54 )    Color: x / Appearance: x / SG: x / pH: x  Gluc: 111 mg/dL / Ketone: x  / Bili: x / Urobili: x   Blood: x / Protein: x / Nitrite: x   Leuk Esterase: x / RBC: x / WBC x   Sq Epi: x / Non Sq Epi: x / Bacteria: x        RADIOLOGY & ADDITIONAL STUDIES:

## 2024-04-11 NOTE — PROGRESS NOTE ADULT - ASSESSMENT
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 Now s/p EUA with rectal dilation and flex sig on 4/9.    Reg diet  Pain, nausea control PRN   Miralax TID  CTX/Flagyl  OOB/SCDs/ SQH   No AM labs

## 2024-04-11 NOTE — PROGRESS NOTE ADULT - SUBJECTIVE AND OBJECTIVE BOX
GI Consult Progress Note:     OVERNIGHT EVENTS: LAURIE.    SUBJECTIVE / INTERVAL HPI:   Patient was planned for discharge yesterday but developed a fever to 102, so fever work-up sent. UA negative, CXR clear, and blood cultures NGTD.       VITAL SIGNS:  Vital Signs Last 24 Hrs  T(C): 37.1 (11 Apr 2024 04:41), Max: 38.3 (10 Apr 2024 17:26)  T(F): 98.8 (11 Apr 2024 04:41), Max: 101 (10 Apr 2024 17:26)  HR: 100 (11 Apr 2024 04:41) (100 - 115)  BP: 101/66 (11 Apr 2024 04:41) (90/57 - 113/70)  BP(mean): --  RR: 17 (11 Apr 2024 04:41) (17 - 18)  SpO2: 99% (11 Apr 2024 04:41) (95% - 99%)    Parameters below as of 11 Apr 2024 04:41  Patient On (Oxygen Delivery Method): room air        04-10-24 @ 07:01  -  04-11-24 @ 07:00  --------------------------------------------------------  IN: 0 mL / OUT: 400 mL / NET: -400 mL      PHYSICAL EXAM:  General: No acute distress  Lungs: Normal respiratory effort and no intercostal retractions  Cardiovascular: RRR  Abdomen: Soft, non-tender, non-distended  Neurological: Alert and oriented x3  Skin: Warm and dry. No obvious rash      MEDICATIONS:  MEDICATIONS  (STANDING):  cefTRIAXone   IVPB 1000 milliGRAM(s) IV Intermittent every 24 hours  heparin   Injectable 5000 Unit(s) SubCutaneous every 8 hours  influenza   Vaccine 0.5 milliLiter(s) IntraMuscular once  metroNIDAZOLE  IVPB 500 milliGRAM(s) IV Intermittent every 8 hours  polyethylene glycol 3350 17 Gram(s) Oral <User Schedule>    MEDICATIONS  (PRN):  acetaminophen     Tablet .. 650 milliGRAM(s) Oral every 6 hours PRN Mild Pain (1 - 3), Moderate Pain (4 - 6)  ondansetron Injectable 4 milliGRAM(s) IV Push every 6 hours PRN Nausea and/or Vomiting      ALLERGIES:  Allergies    No Known Drug Allergies  Nuts (Anaphylaxis)  Seafood (Anaphylaxis)  Soy (Anaphylaxis)  Felt throat closing after getting &quot;oral contrast&quot; many yrs ago. Has had oral contrast since then w/o issues. Has never had reaction to IV contrast. (Unknown)    Intolerances        LABS:                        11.2   9.14  )-----------( 372      ( 11 Apr 2024 07:54 )             34.0     04-11    134<L>  |  100  |  3<L>  ----------------------------<  111<H>  3.9   |  24  |  0.78    Ca    9.0      11 Apr 2024 07:54  Phos  2.4     04-11  Mg     1.8     04-11        Urinalysis Basic - ( 11 Apr 2024 07:54 )    Color: x / Appearance: x / SG: x / pH: x  Gluc: 111 mg/dL / Ketone: x  / Bili: x / Urobili: x   Blood: x / Protein: x / Nitrite: x   Leuk Esterase: x / RBC: x / WBC x   Sq Epi: x / Non Sq Epi: x / Bacteria: x      CAPILLARY BLOOD GLUCOSE          Urinalysis with Rflx Culture (collected 04-10-24 @ 19:02)    Culture - Blood (collected 04-10-24 @ 18:00)  Source: .Blood Blood  Preliminary Report (04-11-24 @ 07:01):    No growth at 12 hours    Culture - Blood (collected 04-10-24 @ 17:50)  Source: .Blood Blood  Preliminary Report (04-11-24 @ 07:01):    No growth at 12 hours        RADIOLOGY & ADDITIONAL TESTS: Reviewed.

## 2024-04-12 LAB
ADD ON TEST-SPECIMEN IN LAB: SIGNIFICANT CHANGE UP
ANION GAP SERPL CALC-SCNC: 10 MMOL/L — SIGNIFICANT CHANGE UP (ref 5–17)
BASOPHILS # BLD AUTO: 0.04 K/UL — SIGNIFICANT CHANGE UP (ref 0–0.2)
BASOPHILS NFR BLD AUTO: 0.4 % — SIGNIFICANT CHANGE UP (ref 0–2)
BUN SERPL-MCNC: 3 MG/DL — LOW (ref 7–23)
CALCIUM SERPL-MCNC: 8.8 MG/DL — SIGNIFICANT CHANGE UP (ref 8.4–10.5)
CHLORIDE SERPL-SCNC: 99 MMOL/L — SIGNIFICANT CHANGE UP (ref 96–108)
CO2 SERPL-SCNC: 23 MMOL/L — SIGNIFICANT CHANGE UP (ref 22–31)
CREAT SERPL-MCNC: 1.14 MG/DL — SIGNIFICANT CHANGE UP (ref 0.5–1.3)
EGFR: 64 ML/MIN/1.73M2 — SIGNIFICANT CHANGE UP
EOSINOPHIL # BLD AUTO: 0.01 K/UL — SIGNIFICANT CHANGE UP (ref 0–0.5)
EOSINOPHIL NFR BLD AUTO: 0.1 % — SIGNIFICANT CHANGE UP (ref 0–6)
GLUCOSE SERPL-MCNC: 100 MG/DL — HIGH (ref 70–99)
HCT VFR BLD CALC: 33.8 % — LOW (ref 34.5–45)
HGB BLD-MCNC: 10.9 G/DL — LOW (ref 11.5–15.5)
IMM GRANULOCYTES NFR BLD AUTO: 0.5 % — SIGNIFICANT CHANGE UP (ref 0–0.9)
LYMPHOCYTES # BLD AUTO: 1.83 K/UL — SIGNIFICANT CHANGE UP (ref 1–3.3)
LYMPHOCYTES # BLD AUTO: 19.4 % — SIGNIFICANT CHANGE UP (ref 13–44)
MAGNESIUM SERPL-MCNC: 2.1 MG/DL — SIGNIFICANT CHANGE UP (ref 1.6–2.6)
MCHC RBC-ENTMCNC: 27.1 PG — SIGNIFICANT CHANGE UP (ref 27–34)
MCHC RBC-ENTMCNC: 32.2 GM/DL — SIGNIFICANT CHANGE UP (ref 32–36)
MCV RBC AUTO: 84.1 FL — SIGNIFICANT CHANGE UP (ref 80–100)
MONOCYTES # BLD AUTO: 0.78 K/UL — SIGNIFICANT CHANGE UP (ref 0–0.9)
MONOCYTES NFR BLD AUTO: 8.3 % — SIGNIFICANT CHANGE UP (ref 2–14)
NEUTROPHILS # BLD AUTO: 6.7 K/UL — SIGNIFICANT CHANGE UP (ref 1.8–7.4)
NEUTROPHILS NFR BLD AUTO: 71.3 % — SIGNIFICANT CHANGE UP (ref 43–77)
NRBC # BLD: 0 /100 WBCS — SIGNIFICANT CHANGE UP (ref 0–0)
PHOSPHATE SERPL-MCNC: 4.2 MG/DL — SIGNIFICANT CHANGE UP (ref 2.5–4.5)
PLATELET # BLD AUTO: 352 K/UL — SIGNIFICANT CHANGE UP (ref 150–400)
POTASSIUM SERPL-MCNC: 3.7 MMOL/L — SIGNIFICANT CHANGE UP (ref 3.5–5.3)
POTASSIUM SERPL-SCNC: 3.7 MMOL/L — SIGNIFICANT CHANGE UP (ref 3.5–5.3)
RBC # BLD: 4.02 M/UL — SIGNIFICANT CHANGE UP (ref 3.8–5.2)
RBC # FLD: 13.1 % — SIGNIFICANT CHANGE UP (ref 10.3–14.5)
SODIUM SERPL-SCNC: 132 MMOL/L — LOW (ref 135–145)
WBC # BLD: 9.41 K/UL — SIGNIFICANT CHANGE UP (ref 3.8–10.5)
WBC # FLD AUTO: 9.41 K/UL — SIGNIFICANT CHANGE UP (ref 3.8–10.5)

## 2024-04-12 PROCEDURE — 99254 IP/OBS CNSLTJ NEW/EST MOD 60: CPT

## 2024-04-12 PROCEDURE — 99232 SBSQ HOSP IP/OBS MODERATE 35: CPT | Mod: GC

## 2024-04-12 PROCEDURE — 76705 ECHO EXAM OF ABDOMEN: CPT | Mod: 26

## 2024-04-12 RX ORDER — ACETAMINOPHEN 500 MG
1000 TABLET ORAL ONCE
Refills: 0 | Status: COMPLETED | OUTPATIENT
Start: 2024-04-12 | End: 2024-04-12

## 2024-04-12 RX ORDER — POTASSIUM CHLORIDE 20 MEQ
40 PACKET (EA) ORAL ONCE
Refills: 0 | Status: COMPLETED | OUTPATIENT
Start: 2024-04-12 | End: 2024-04-12

## 2024-04-12 RX ADMIN — Medication 650 MILLIGRAM(S): at 23:16

## 2024-04-12 RX ADMIN — PIPERACILLIN AND TAZOBACTAM 25 GRAM(S): 4; .5 INJECTION, POWDER, LYOPHILIZED, FOR SOLUTION INTRAVENOUS at 07:13

## 2024-04-12 RX ADMIN — PIPERACILLIN AND TAZOBACTAM 25 GRAM(S): 4; .5 INJECTION, POWDER, LYOPHILIZED, FOR SOLUTION INTRAVENOUS at 16:01

## 2024-04-12 RX ADMIN — ONDANSETRON 4 MILLIGRAM(S): 8 TABLET, FILM COATED ORAL at 00:34

## 2024-04-12 RX ADMIN — POLYETHYLENE GLYCOL 3350 17 GRAM(S): 17 POWDER, FOR SOLUTION ORAL at 05:35

## 2024-04-12 RX ADMIN — Medication 1000 MILLIGRAM(S): at 00:46

## 2024-04-12 RX ADMIN — PIPERACILLIN AND TAZOBACTAM 25 GRAM(S): 4; .5 INJECTION, POWDER, LYOPHILIZED, FOR SOLUTION INTRAVENOUS at 23:17

## 2024-04-12 RX ADMIN — Medication 40 MILLIEQUIVALENT(S): at 09:37

## 2024-04-12 RX ADMIN — Medication 650 MILLIGRAM(S): at 09:37

## 2024-04-12 RX ADMIN — Medication 400 MILLIGRAM(S): at 17:01

## 2024-04-12 NOTE — DIETITIAN INITIAL EVALUATION ADULT - ADD RECOMMEND
1. Continue current diet as tolerated: Regular.  Fluids deferred to medical team.   2. Recommend Isent[employer] Standard 1.4 1x/day (455 kcal, 20 g protein in each) to optimize intake   3. Monitor PO intake, diet tolerance, weight trends, labs, and skin integrity and bowel movement regularity.   4. Encourage PO intake and honor food preferences as able unless otherwise contraindicated.   5. RDN will continue to monitor, reassess, and intervene as appropriate.

## 2024-04-12 NOTE — PROGRESS NOTE ADULT - SUBJECTIVE AND OBJECTIVE BOX
GI Consult Progress Note:     OVERNIGHT EVENTS: LAURIE.    SUBJECTIVE / INTERVAL HPI:   Patient seen and examined at bedside.     VITAL SIGNS:  Vital Signs Last 24 Hrs  T(C): 37.8 (12 Apr 2024 08:57), Max: 39.2 (11 Apr 2024 23:49)  T(F): 100 (12 Apr 2024 08:57), Max: 102.5 (11 Apr 2024 23:49)  HR: 105 (12 Apr 2024 08:57) (95 - 117)  BP: 102/62 (12 Apr 2024 08:57) (91/56 - 108/67)  BP(mean): --  RR: 16 (12 Apr 2024 08:57) (16 - 19)  SpO2: 95% (12 Apr 2024 08:57) (95% - 99%)    Parameters below as of 12 Apr 2024 08:57  Patient On (Oxygen Delivery Method): room air        04-11-24 @ 07:01  -  04-12-24 @ 07:00  --------------------------------------------------------  IN: 957 mL / OUT: 1601 mL / NET: -644 mL    04-12-24 @ 07:01  -  04-12-24 @ 10:27  --------------------------------------------------------  IN: 320 mL / OUT: 0 mL / NET: 320 mL        PHYSICAL EXAM:    General: WDWN  HEENT: NC/AT; PERRL, anicteric sclera; MMM  Neck: supple  Cardiovascular: +S1/S2; RRR  Respiratory: CTA B/L; no W/R/R  Gastrointestinal: soft, NT/ND; +BSx4  Extremities: WWP; no edema, clubbing or cyanosis  Vascular: 2+ radial, DP/PT pulses B/L  Neurological: AAOx3; no focal deficits    MEDICATIONS:  MEDICATIONS  (STANDING):  heparin   Injectable 5000 Unit(s) SubCutaneous every 8 hours  influenza   Vaccine 0.5 milliLiter(s) IntraMuscular once  piperacillin/tazobactam IVPB.. 4.5 Gram(s) IV Intermittent every 8 hours  polyethylene glycol 3350 17 Gram(s) Oral <User Schedule>    MEDICATIONS  (PRN):  acetaminophen     Tablet .. 650 milliGRAM(s) Oral every 6 hours PRN Mild Pain (1 - 3), Moderate Pain (4 - 6)  ondansetron Injectable 4 milliGRAM(s) IV Push every 6 hours PRN Nausea and/or Vomiting      ALLERGIES:  Allergies    No Known Drug Allergies  Nuts (Anaphylaxis)  Seafood (Anaphylaxis)  Soy (Anaphylaxis)  Felt throat closing after getting &quot;oral contrast&quot; many yrs ago. Has had oral contrast since then w/o issues. Has never had reaction to IV contrast. (Unknown)    Intolerances        LABS:                        10.9   9.41  )-----------( 352      ( 12 Apr 2024 05:30 )             33.8     04-12    132<L>  |  99  |  3<L>  ----------------------------<  100<H>  3.7   |  23  |  1.14    Ca    8.8      12 Apr 2024 05:30  Phos  4.2     04-12  Mg     2.1     04-12        Urinalysis Basic - ( 12 Apr 2024 05:30 )    Color: x / Appearance: x / SG: x / pH: x  Gluc: 100 mg/dL / Ketone: x  / Bili: x / Urobili: x   Blood: x / Protein: x / Nitrite: x   Leuk Esterase: x / RBC: x / WBC x   Sq Epi: x / Non Sq Epi: x / Bacteria: x      CAPILLARY BLOOD GLUCOSE            RADIOLOGY & ADDITIONAL TESTS: Reviewed.    ASSESSMENT:    PLAN:    GI Consult Progress Note:     OVERNIGHT EVENTS: LAURIE.    SUBJECTIVE / INTERVAL HPI:   Patient seen and examined at bedside. No acute complaint as this time. Still having fevers, so broadened to Zosyn.       VITAL SIGNS:  Vital Signs Last 24 Hrs  T(C): 37.8 (12 Apr 2024 08:57), Max: 39.2 (11 Apr 2024 23:49)  T(F): 100 (12 Apr 2024 08:57), Max: 102.5 (11 Apr 2024 23:49)  HR: 105 (12 Apr 2024 08:57) (95 - 117)  BP: 102/62 (12 Apr 2024 08:57) (91/56 - 108/67)  BP(mean): --  RR: 16 (12 Apr 2024 08:57) (16 - 19)  SpO2: 95% (12 Apr 2024 08:57) (95% - 99%)    Parameters below as of 12 Apr 2024 08:57  Patient On (Oxygen Delivery Method): room air      04-11-24 @ 07:01  -  04-12-24 @ 07:00  --------------------------------------------------------  IN: 957 mL / OUT: 1601 mL / NET: -644 mL    04-12-24 @ 07:01  -  04-12-24 @ 10:27  --------------------------------------------------------  IN: 320 mL / OUT: 0 mL / NET: 320 mL      PHYSICAL EXAM:  General: No acute distress  Lungs: Normal respiratory effort and no intercostal retractions  Cardiovascular: RRR  Abdomen: Soft, non-tender, non-distended  Neurological: Alert and oriented x3  Skin: Warm and dry. No obvious rash       MEDICATIONS:  MEDICATIONS  (STANDING):  heparin   Injectable 5000 Unit(s) SubCutaneous every 8 hours  influenza   Vaccine 0.5 milliLiter(s) IntraMuscular once  piperacillin/tazobactam IVPB.. 4.5 Gram(s) IV Intermittent every 8 hours  polyethylene glycol 3350 17 Gram(s) Oral <User Schedule>    MEDICATIONS  (PRN):  acetaminophen     Tablet .. 650 milliGRAM(s) Oral every 6 hours PRN Mild Pain (1 - 3), Moderate Pain (4 - 6)  ondansetron Injectable 4 milliGRAM(s) IV Push every 6 hours PRN Nausea and/or Vomiting      ALLERGIES:  Allergies    No Known Drug Allergies  Nuts (Anaphylaxis)  Seafood (Anaphylaxis)  Soy (Anaphylaxis)  Felt throat closing after getting &quot;oral contrast&quot; many yrs ago. Has had oral contrast since then w/o issues. Has never had reaction to IV contrast. (Unknown)    Intolerances        LABS:                        10.9   9.41  )-----------( 352      ( 12 Apr 2024 05:30 )             33.8     04-12    132<L>  |  99  |  3<L>  ----------------------------<  100<H>  3.7   |  23  |  1.14    Ca    8.8      12 Apr 2024 05:30  Phos  4.2     04-12  Mg     2.1     04-12        Urinalysis Basic - ( 12 Apr 2024 05:30 )    Color: x / Appearance: x / SG: x / pH: x  Gluc: 100 mg/dL / Ketone: x  / Bili: x / Urobili: x   Blood: x / Protein: x / Nitrite: x   Leuk Esterase: x / RBC: x / WBC x   Sq Epi: x / Non Sq Epi: x / Bacteria: x      CAPILLARY BLOOD GLUCOSE    RADIOLOGY & ADDITIONAL TESTS: Reviewed.

## 2024-04-12 NOTE — DIETITIAN INITIAL EVALUATION ADULT - EDUCATION DIETARY MODIFICATIONS
Educated/Discussed on ways to promote intake in setting of low appetite. Educated/Discussed the importance to prioritize protein at meal times. Reviewed good sources of protein on the menu. Pt amenable to oral nutrition supplement to optimize intake. Pt receptive and verbalizes understanding./(2) meets goals/outcomes/verbalization

## 2024-04-12 NOTE — DIETITIAN INITIAL EVALUATION ADULT - NSFNSNUTRHOMESUPPLEMENTFT_GEN_A_CORE
States that home she takes vitamin D and multivitamin at home. Reports no additional use of oral nutritional supplement.

## 2024-04-12 NOTE — PROGRESS NOTE ADULT - ASSESSMENT
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 Now s/p EUA with rectal dilation and flex sig on 4/9. Patient continues spiking fevers while on antibiotics.    Reg diet  Pain, nausea control PRN   Miralax TID  Zosyn - f/u ID recs  Vanc  OOB/SCDs/ SQH   No AM labs

## 2024-04-12 NOTE — DIETITIAN INITIAL EVALUATION ADULT - PERTINENT MEDS FT
MEDICATIONS  (STANDING):  heparin   Injectable 5000 Unit(s) SubCutaneous every 8 hours  influenza   Vaccine 0.5 milliLiter(s) IntraMuscular once  piperacillin/tazobactam IVPB.. 4.5 Gram(s) IV Intermittent every 8 hours  polyethylene glycol 3350 17 Gram(s) Oral <User Schedule>    MEDICATIONS  (PRN):  acetaminophen     Tablet .. 650 milliGRAM(s) Oral every 6 hours PRN Mild Pain (1 - 3), Moderate Pain (4 - 6)  ondansetron Injectable 4 milliGRAM(s) IV Push every 6 hours PRN Nausea and/or Vomiting

## 2024-04-12 NOTE — DIETITIAN INITIAL EVALUATION ADULT - ENERGY INTAKE
Pt is tolerating current diet: Regular. States that while inpatient her appetite has been low; endorses consuming ~50% of meals. States that she has a metallic taste which effects her appetite which she attributes to the antibiotics.

## 2024-04-12 NOTE — DIETITIAN NUTRITION RISK NOTIFICATION - ADDITIONAL COMMENTS/DIETITIAN RECOMMENDATIONS
Moderate Acute Malnutrition RT inadequate PO intake in setting of demands for nutrients AEB mild muscle/fat depletions, 2% weight loss x 1 week    1. Continue current diet as tolerated: Regular.  Fluids deferred to medical team.   2. Recommend [employer] Standard 1.4 1x/day (455 kcal, 20 g protein in each) to optimize intake   3. Monitor PO intake, diet tolerance, weight trends, labs, and skin integrity and bowel movement regularity.   4. Encourage PO intake and honor food preferences as able unless otherwise contraindicated.   5. RDN will continue to monitor, reassess, and intervene as appropriate.

## 2024-04-12 NOTE — DIETITIAN INITIAL EVALUATION ADULT - REASON FOR ADMISSION
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 Now s/p EUA with rectal dilation and flex sig on 4/9. Patient continues spiking fevers while on antibiotics."

## 2024-04-12 NOTE — DIETITIAN INITIAL EVALUATION ADULT - PERTINENT LABORATORY DATA
04-12    132<L>  |  99  |  3<L>  ----------------------------<  100<H>  3.7   |  23  |  1.14    Ca    8.8      12 Apr 2024 05:30  Phos  4.2     04-12  Mg     2.1     04-12

## 2024-04-12 NOTE — PROGRESS NOTE ADULT - SUBJECTIVE AND OBJECTIVE BOX
INTERVAL HPI/OVERNIGHT EVENTS: stat vitals afebrile, SBP 91 asymptomatic, RVP/covid negative, final UCx normal dell, temp to 102.5, stat blood cultures sent, IV tylenol/ice packs, c/o nausea with spitting up and sob, HR 110s, abd benign, sob self-resolved with resolution of nausea    STATUS POST:  EUA, rectal dilation, and flex sig    POST OPERATIVE DAY #: 3    SUBJECTIVE: Patient seen and examined at bedside with chief resident. Patient states that she has had nausea overnight with multiple episodes of spit up, denies emesis. She denies abdominal pain or rectal pain. She endorses having 3 nonbloody BMs.      heparin   Injectable 5000 Unit(s) SubCutaneous every 8 hours  piperacillin/tazobactam IVPB.. 4.5 Gram(s) IV Intermittent every 8 hours      Vital Signs Last 24 Hrs  T(C): 37.8 (12 Apr 2024 08:57), Max: 39.2 (11 Apr 2024 23:49)  T(F): 100 (12 Apr 2024 08:57), Max: 102.5 (11 Apr 2024 23:49)  HR: 105 (12 Apr 2024 08:57) (95 - 117)  BP: 102/62 (12 Apr 2024 08:57) (91/56 - 108/67)  BP(mean): --  RR: 16 (12 Apr 2024 08:57) (16 - 19)  SpO2: 95% (12 Apr 2024 08:57) (95% - 99%)    Parameters below as of 12 Apr 2024 08:57  Patient On (Oxygen Delivery Method): room air      I&O's Detail    11 Apr 2024 07:01  -  12 Apr 2024 07:00  --------------------------------------------------------  IN:    Oral Fluid: 957 mL  Total IN: 957 mL    OUT:    Stool (mL): 1 mL    Voided (mL): 1600 mL  Total OUT: 1601 mL    Total NET: -644 mL      12 Apr 2024 07:01  -  12 Apr 2024 09:54  --------------------------------------------------------  IN:    Oral Fluid: 320 mL  Total IN: 320 mL    OUT:  Total OUT: 0 mL    Total NET: 320 mL          General: NAD, resting comfortably in bed  C/V: NSR  Pulm: Nonlabored breathing, no respiratory distress  Abd: soft, NT/ND.  Extrem: WWP, no edema, SCDs in place        LABS:                        10.9   9.41  )-----------( 352      ( 12 Apr 2024 05:30 )             33.8     04-12    132<L>  |  99  |  3<L>  ----------------------------<  100<H>  3.7   |  23  |  1.14    Ca    8.8      12 Apr 2024 05:30  Phos  4.2     04-12  Mg     2.1     04-12        Urinalysis Basic - ( 12 Apr 2024 05:30 )    Color: x / Appearance: x / SG: x / pH: x  Gluc: 100 mg/dL / Ketone: x  / Bili: x / Urobili: x   Blood: x / Protein: x / Nitrite: x   Leuk Esterase: x / RBC: x / WBC x   Sq Epi: x / Non Sq Epi: x / Bacteria: x        RADIOLOGY & ADDITIONAL STUDIES:

## 2024-04-12 NOTE — PROGRESS NOTE ADULT - ASSESSMENT
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 Admitted to surgical service for rectal stricture dilation with Dr. Talamantes.     Underwent dilation of rectal stricture with colorectal surgery on 04/09. Was dilated to the size of 58Fr. Continues to be febrile but UA, blood cultures, and CXR negative. Now broadened to Zosyn.     CT abdomen/pelvis:   - No significant change since 4/8/2024.  - Unchanged mild inflammatory changes of the terminal ileum and rectum.  - No bowel obstruction.    Recommendations:   - f/u fecal calprotectin and CRP this admission 38   - abx per surgery team, currently on Zosyn   - tolerating regular diet without issues   - has a planned date for colonoscopy with Dr. Wu     Case discussed with Dr. Bautista. GI Team will continue to follow.     Gisella Quigley D.O.   Gastroenterology Fellow  Weekday 7am-5pm Pager: [phone]  Weeknights/Weekend/Holiday Coverage: Please call the  for contact info.

## 2024-04-12 NOTE — PROGRESS NOTE ADULT - ATTENDING COMMENTS
Patient seen and discussed with fellow plan as noted above
Patient seen, examined, and discussed with Dr. Quigley. Agree with above. 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 Admitted to surgical service for rectal stricture dilation with Dr. Talamantes. S/p dilation, but subsequently developed fever to 102F. Could this be transient bacterial translocation from dilation? Or a whole different process? F/u CT imaging, blood cx. Agree with empiric abx.     Ronda Adler MD  Gastroenterology

## 2024-04-12 NOTE — DIETITIAN INITIAL EVALUATION ADULT - PHYSCIAL ASSESSMENT
Weights:   4/9 166 pounds   Per HIE:  10/31/2023 168 pounds     UBW:~170 pounds (per pt) x 1 week   IBW: 135 pounds   %IBW: 123%    Weights noted; Suggest 2% weight loss x 1 week. RD to continue to monitor weight trends.

## 2024-04-12 NOTE — CONSULT NOTE ADULT - SUBJECTIVE AND OBJECTIVE BOX
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     The patient was seen by GI (Dr. Wu) last Thursday for increasing abdominal pain, was prescribed steroid course (prednisone 10mg PO) , however AXR demonstrated large stool burden. Was recommending to come to ED on Friday for rectal dilation, however patient opted for noninteventional management at that time. She was recommended to take Miralax and laxatives for BMs. She states she was having loose BMs on Saturday, similar in consistency to when she took prep for colonoscopy, however no BMs since Saturday. She continues to pass flatus and tolerating liquids (as was suggested by GI) had one episode of nbnb emesis this AM, but tolerated dinner last night. No blood in stool endorsed/ Pt denies fever, chills, recent weight loss, chest pain, palpitations, SOB,  dysuria, dizziness, syncope.      PMHx - Crohns  PSHx - none  Meds - previously on infliximab, mesalamine (off for 8 weeks)  Allergies - NKDA  SoHx - socially EtOH, no tobacco/rec drugs  FamHx - no IBD, colon ca, bleeding or clotting disorders  CScope - 10/31/23 w/ balloon dilation;   Last Colonoscopy 10/31/23: Findings On JOSE there was a 6cm distal rectal stricture from 3-9cm from anal verge that was manually dilated prior to procedure and allowed passage of the PCF colonoscope. The stricture was later balloon dilated from 12 to 13.5 cm upon completion of the colonoscopy. There was ulceration and a pinhole stricture at the entrance of the terminal ileum. A guidewire was used to confirm placement. The stricture was dilated from 10 to 15mm; however, the PCF colonoscope was unable to pass the stricture following dilation. The rest of the colon was otherwise unremarkable    Of note, patient saw Dr. Talamantes in 02/24 and was advised to continue with medical management of her Chron's, but that if she were to get worsening signs of obstruction she would have to be newly evaluated with possible diversion/ protectomy.     In the ED, the patient is afebrile, tachycardic to 117, normotensive, and satting well on RA.   On exam abdomen is soft, minimally tender in all 4 quadrants, no rebound or guarding, nondistended. JOSE with no presence of active drainage of blood or purulence, able to only pass 5th digit through anal orifice.   Labs with WBC 11.42, no left shift. BMP with Na 134, BUN/Cr 6/.81  UA with trace LE and ketones.   CTAP shows an approximately 3-4 severe segment of wall thickening with probable mild active inflammation of the distal/terminal ileum extending to the ileocecal valve. No evidence of stricturing disease. No bowel obstruction.There is mild wall thickening of the rectosigmoid colon, which could be due to underdistention or colitis. Possible subtle residual collapsed rectovaginal fistula, better evaluated on the comparison MRI and small persistent right-sided perianal fistula extending inferiorly to the right medial gluteal skin. Malpositioned intrauterine device rotated greater than 90 degrees with stem oriented toward the right cornua.   (08 Apr 2024 20:43)      PAST MEDICAL & SURGICAL HISTORY:  Crohn disease      No significant past surgical history            Review of Systems:   Constitutional, eyes, ENT, cardiovascular, respiratory, gastrointestinal, genitourinary, integumentary, neurological, psychiatric and heme/lymph are otherwise negative other than noted above       ANTIBIOTICS:  MEDICATIONS  (STANDING):  heparin   Injectable 5000 Unit(s) SubCutaneous every 8 hours  influenza   Vaccine 0.5 milliLiter(s) IntraMuscular once  piperacillin/tazobactam IVPB.. 4.5 Gram(s) IV Intermittent every 8 hours  polyethylene glycol 3350 17 Gram(s) Oral <User Schedule>    MEDICATIONS  (PRN):  acetaminophen     Tablet .. 650 milliGRAM(s) Oral every 6 hours PRN Mild Pain (1 - 3), Moderate Pain (4 - 6)  ondansetron Injectable 4 milliGRAM(s) IV Push every 6 hours PRN Nausea and/or Vomiting      Allergies    No Known Drug Allergies  Nuts (Anaphylaxis)  Seafood (Anaphylaxis)  Soy (Anaphylaxis)  Felt throat closing after getting &quot;oral contrast&quot; many yrs ago. Has had oral contrast since then w/o issues. Has never had reaction to IV contrast. (Unknown)    Intolerances        SOCIAL HISTORY:    FAMILY HISTORY:  No pertinent family history in first degree relatives     no FH leading to current infection    Vital Signs Last 24 Hrs  T(C): 37.8 (12 Apr 2024 08:57), Max: 39.2 (11 Apr 2024 23:49)  T(F): 100 (12 Apr 2024 08:57), Max: 102.5 (11 Apr 2024 23:49)  HR: 105 (12 Apr 2024 08:57) (95 - 117)  BP: 102/62 (12 Apr 2024 08:57) (91/56 - 108/67)  BP(mean): --  RR: 16 (12 Apr 2024 08:57) (16 - 19)  SpO2: 95% (12 Apr 2024 08:57) (95% - 99%)    Parameters below as of 12 Apr 2024 08:57  Patient On (Oxygen Delivery Method): room air        04-11-24 @ 07:01  -  04-12-24 @ 07:00  --------------------------------------------------------  IN: 957 mL / OUT: 1601 mL / NET: -644 mL    04-12-24 @ 07:01  -  04-12-24 @ 10:17  --------------------------------------------------------  IN: 320 mL / OUT: 0 mL / NET: 320 mL        PHYSICAL EXAM:  Constitutional: alert, NAD  Eyes: the sclera and conjunctiva were normal.   ENT: the ears and nose were normal in appearance.   Neck: the appearance of the neck was normal and the neck was supple.   Pulmonary: no respiratory distress and lungs were clear to auscultation bilaterally.   Heart: heart rate was normal and rhythm regular, normal S1 and S2  Vascular:. there was no peripheral edema  Abdomen: normal bowel sounds, soft, non-tender  Neurological: no focal deficits.   Psychiatric: the affect was normal      LABS:                        10.9   9.41  )-----------( 352      ( 12 Apr 2024 05:30 )             33.8     04-12    132<L>  |  99  |  3<L>  ----------------------------<  100<H>  3.7   |  23  |  1.14    Ca    8.8      12 Apr 2024 05:30  Phos  4.2     04-12  Mg     2.1     04-12        Urinalysis Basic - ( 12 Apr 2024 05:30 )    Color: x / Appearance: x / SG: x / pH: x  Gluc: 100 mg/dL / Ketone: x  / Bili: x / Urobili: x   Blood: x / Protein: x / Nitrite: x   Leuk Esterase: x / RBC: x / WBC x   Sq Epi: x / Non Sq Epi: x / Bacteria: x        MICROBIOLOGY:    RADIOLOGY & ADDITIONAL STUDIES:  
  Initial GI Consult Note:     GI consulted by ED for IBD management as patient follows with Dr. Wu.     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     Patient seen and examined at bedside. States that she started having significant pain in her rectum followed by a lack of bowel movements and incomplete emptying, so contacted the GI office (Dr. Wu). Dr. Wu's office then ordered and abdominal x-ray and advised her to start laxatives and stool softeners. Patient states that she initially had some liquid bowel movements, but that shortly thereafter she quit having any bowel movements at all. Continues to pass flatus. At this point in time, was also prescribed prednisone 10 mg PO daily.     Underwent abdominal x-ray on 04/04/24, which was read as abundant feces/impaction with no abnormal bowel dilatation or pneumoperitoneum. Patient continued to have worsening pain and was unable to produce a bowel movement, so was then advised to come to the ED for admission and evaluation by colorectal surgery team. Patient has previously been seen by Dr. Talamantes out-patient.     Once abdominal x-ray showed evidence of fecal impaction, patient was initially planned for an anal dilation with the CRS; however, after discussion with Dr. Talamantes's office, it was ultimately concluded that an ER admission would be preferable as opposed to scheduling the OR given severity of symptoms and possible need for fecal diversion.     Additional collateral obtained from recent out-patient notes:   After an out-patient visit on 03/18/24, patient elected to skip her most recent IFX infusion in order to undergo a medication washout period to allow for sooner research trial screening.   After a thoughtful discussion, patient opted to enroll in duet-cd clinical trial to ideally achieve better medically management, improve the previously underestimated inflammatory burden, improve obstructive symptoms related to strictures, and avoid/delay surgical intervention.    Patient is currently hemodynamically stable.   Labs significant for:   WBC 11.42 (in setting of steroids), H/H 11.5/36.1, plt 457   CMP within normal limits with Na 124, K 4.6, Cr 0.81, AST/ALT 13/11, alk phos 101, total bili 0.3       VITAL SIGNS:  Vital Signs Last 24 Hrs  T(C): 36.9 (09 Apr 2024 15:12), Max: 37 (09 Apr 2024 08:15)  T(F): 98.4 (09 Apr 2024 13:57), Max: 98.6 (09 Apr 2024 08:15)  HR: 88 (09 Apr 2024 15:12) (80 - 90)  BP: 107/72 (09 Apr 2024 15:12) (100/67 - 109/72)  BP(mean): 84 (09 Apr 2024 15:12) (84 - 84)  RR: 18 (09 Apr 2024 15:12) (17 - 18)  SpO2: 100% (09 Apr 2024 15:12) (98% - 100%)    Parameters below as of 09 Apr 2024 13:57  Patient On (Oxygen Delivery Method): room air        04-08-24 @ 07:01  -  04-09-24 @ 07:00  --------------------------------------------------------  IN: 1035 mL / OUT: 650 mL / NET: 385 mL    04-09-24 @ 07:01  -  04-09-24 @ 15:38  --------------------------------------------------------  IN: 805 mL / OUT: 0 mL / NET: 805 mL      PHYSICAL EXAM:  General: No acute distress  Lungs: Normal respiratory effort and no intercostal retractions  Cardiovascular: RRR  Abdomen: Soft, non-tender, non-distended  Neurological: Alert and oriented x3  Skin: Warm and dry. No obvious rash    MEDICATIONS:  MEDICATIONS  (STANDING):  heparin   Injectable 5000 Unit(s) SubCutaneous every 8 hours  influenza   Vaccine 0.5 milliLiter(s) IntraMuscular once  lactated ringers. 1000 milliLiter(s) (115 mL/Hr) IV Continuous <Continuous>    MEDICATIONS  (PRN):  acetaminophen     Tablet .. 650 milliGRAM(s) Oral every 6 hours PRN Mild Pain (1 - 3)  bisacodyl 5 milliGRAM(s) Oral every 12 hours PRN Constipation  ondansetron Injectable 4 milliGRAM(s) IV Push every 6 hours PRN Nausea      ALLERGIES:  Allergies    No Known Drug Allergies  Nuts (Anaphylaxis)  Seafood (Anaphylaxis)  Soy (Anaphylaxis)  Felt throat closing after getting &quot;oral contrast&quot; many yrs ago. Has had oral contrast since then w/o issues. Has never had reaction to IV contrast. (Unknown)    Intolerances        LABS:                        11.7   10.61 )-----------( 448      ( 09 Apr 2024 05:30 )             36.6     04-09    135  |  102  |  4<L>  ----------------------------<  85  4.1   |  31  |  0.76    Ca    9.2      09 Apr 2024 05:30  Phos  3.5     04-09  Mg     2.1     04-09    TPro  8.1  /  Alb  3.3  /  TBili  0.2  /  DBili  x   /  AST  13  /  ALT  10  /  AlkPhos  98  04-09    PT/INR - ( 09 Apr 2024 05:30 )   PT: 13.3 sec;   INR: 1.17          PTT - ( 09 Apr 2024 05:30 )  PTT:31.2 sec  Urinalysis Basic - ( 09 Apr 2024 05:30 )    Color: x / Appearance: x / SG: x / pH: x  Gluc: 85 mg/dL / Ketone: x  / Bili: x / Urobili: x   Blood: x / Protein: x / Nitrite: x   Leuk Esterase: x / RBC: x / WBC x   Sq Epi: x / Non Sq Epi: x / Bacteria: x      CAPILLARY BLOOD GLUCOSE  RADIOLOGY & ADDITIONAL TESTS: Reviewed.  
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   The patient was seen by GI (Dr. Wu) last Thursday for increasing abdominal pain, was prescribed steroid course (prednisone 10mg PO) which pt says she did not start taking. AXR demonstrated large stool burden. Was recommended to come to ED for rectal dilation, however patient opted for noninteventional management at that time. She was recommended to take Miralax and laxatives for BMs. Had loose BMs reportedly similar in consistency to when she took prep for colonoscopy.   In the ED, the patient was afebrile, tachycardic to 117, normotensive, and satting well on RA. Per chart, abdomen was soft, minimally tender in RUQ, nondistended. Per notes, JOSE with no presence of active drainage of blood or purulence, able to only pass 5th digit through anal orifice, labs with WBC 11.42, no left shift. BMP with Na 134, BUN/Cr 6/.81 UA with trace LE and ketones. CTAP showed an approximately 3-4 severe segment of wall thickening with probable mild active inflammation of the distal/terminal ileum extending to the ileocecal valve. No evidence of stricturing disease. No bowel obstruction. Mild wall thickening of the rectosigmoid colon, which could be due to underdistention or colitis. Possible subtle residual collapsed rectovaginal fistula, and small persistent right-sided perianal fistula extending inferiorly to the right medial gluteal skin. Malpositioned intrauterine device rotated greater than 90 degrees with stem oriented toward the right cornua. The patient was admitted on 4/8 and bowel prep was started with dulcolax and golytely. On 4/9 she underwent EUA with rectal dilation and flexible sig; noted to be febrile on 4/10 and 4/11. She was placed on empiric IV Piptazo 4.5 gm q8h, 4/8 WBC was 11.42 which has since normalized. ALT/AST 64/62, ALP WNL. 4/10 UA w 7 wbc, small LE but pt denies dysuria/LUTS. RVP neg, 4/10 and 4/11 BCx ngtd.   Denies cough, reports one episode of emesis at initial presentation and now dry heaving since Piptazo started but states that she has not been eating much and would like to continue same Abx with oral intake before switching Abx.   Has RUQ tenderness on exam.       PMHx - Crohns  PSHx - none  Meds - previously on infliximab, mesalamine (off for 8 weeks)  Allergies - NKDA  SoHx - socially EtOH, no tobacco/rec drugs  Resides in [facility]  and 2 daughters  Works full-time  Denies toxic habits  No pets  No recent travel  FamHx - no IBD, colon ca, bleeding or clotting disorders      PAST MEDICAL & SURGICAL HISTORY:  Crohn disease      No significant past surgical history            REVIEW OF SYSTEMS:    General:	 no weakness; no fevers, no chills  Skin/Breast: no rash  Respiratory and Thorax: no SOB, no cough  Cardiovascular:	No chest pain  Gastrointestinal:	 + nausea, vomiting , AP, no diarrhea  Genitourinary:	no dysuria, no difficulty urinating, no hematuria  Musculoskeletal:	no weakness, no joint swelling/pain  Neurological:	no focal weakness/numbness  Endocrine:	no polyuria, no polydipsia      ANTIBIOTICS:  cefTRIAXone   IVPB   100 mL/Hr IV Intermittent (04-10-24 @ 18:37)    metroNIDAZOLE  IVPB   100 mL/Hr IV Intermittent (04-11-24 @ 13:39)   100 mL/Hr IV Intermittent (04-11-24 @ 03:29)   100 mL/Hr IV Intermittent (04-10-24 @ 19:10)    piperacillin/tazobactam IVPB.   200 mL/Hr IV Intermittent (04-11-24 @ 19:10)    piperacillin/tazobactam IVPB.-   25 mL/Hr IV Intermittent (04-11-24 @ 23:02)    piperacillin/tazobactam IVPB..   25 mL/Hr IV Intermittent (04-12-24 @ 23:17)   25 mL/Hr IV Intermittent (04-12-24 @ 16:01)   25 mL/Hr IV Intermittent (04-12-24 @ 07:13)    vancomycin  IVPB   166.67 mL/Hr IV Intermittent (04-11-24 @ 21:09)      MEDICATIONS  (STANDING):  heparin   Injectable 5000 Unit(s) SubCutaneous every 8 hours  influenza   Vaccine 0.5 milliLiter(s) IntraMuscular once  piperacillin/tazobactam IVPB.. 4.5 Gram(s) IV Intermittent every 8 hours  polyethylene glycol 3350 17 Gram(s) Oral <User Schedule>    MEDICATIONS  (PRN):  acetaminophen     Tablet .. 650 milliGRAM(s) Oral every 6 hours PRN Mild Pain (1 - 3), Moderate Pain (4 - 6)  ondansetron Injectable 4 milliGRAM(s) IV Push every 6 hours PRN Nausea and/or Vomiting      Allergies    No Known Drug Allergies  Nuts (Anaphylaxis)  Seafood (Anaphylaxis)  Soy (Anaphylaxis)  Felt throat closing after getting &quot;oral contrast&quot; many yrs ago. Has had oral contrast since then w/o issues. Has never had reaction to IV contrast. (Unknown)    Intolerances        SOCIAL HISTORY: as above    FAMILY HISTORY:  No pertinent family history in first degree relatives        Vital Signs Last 24 Hrs  T(C): 37.3 (12 Apr 2024 20:01), Max: 39.2 (11 Apr 2024 23:49)  T(F): 99.2 (12 Apr 2024 20:01), Max: 102.5 (11 Apr 2024 23:49)  HR: 96 (12 Apr 2024 20:01) (95 - 116)  BP: 96/63 (12 Apr 2024 20:01) (94/58 - 108/67)  BP(mean): --  RR: 16 (12 Apr 2024 20:01) (16 - 19)  SpO2: 96% (12 Apr 2024 20:01) (95% - 100%)    Parameters below as of 12 Apr 2024 20:01  Patient On (Oxygen Delivery Method): room air        04-11-24 @ 07:01  -  04-12-24 @ 07:00  --------------------------------------------------------  IN: 957 mL / OUT: 1601 mL / NET: -644 mL    04-12-24 @ 07:01  -  04-12-24 @ 23:48  --------------------------------------------------------  IN: 955 mL / OUT: 1050 mL / NET: -95 mL        PHYSICAL EXAM:  Constitutional:Well-developed, well nourished  Eyes:KARLENE, EOMI  Ear/Nose/Throat: no oral lesion, no sinus tenderness on percussion	  Neck:no JVD, no lymphadenopathy, supple  Respiratory: CTA nikhil  Cardiovascular: S1S2 RRR, no murmurs  Gastrointestinal: RUQ tenderness, soft  Extremities:no e/e/c      LABS:                        10.9   9.41  )-----------( 352      ( 12 Apr 2024 05:30 )             33.8     04-12    132<L>  |  99  |  3<L>  ----------------------------<  100<H>  3.7   |  23  |  1.14    Ca    8.8      12 Apr 2024 05:30  Phos  4.2     04-12  Mg     2.1     04-12    TPro  7.4  /  Alb  2.9<L>  /  TBili  0.3  /  DBili  0.2  /  AST  62<H>  /  ALT  64<H>  /  AlkPhos  117  04-12      Urinalysis Basic - ( 12 Apr 2024 05:30 )    Color: x / Appearance: x / SG: x / pH: x  Gluc: 100 mg/dL / Ketone: x  / Bili: x / Urobili: x   Blood: x / Protein: x / Nitrite: x   Leuk Esterase: x / RBC: x / WBC x   Sq Epi: x / Non Sq Epi: x / Bacteria: x        MICROBIOLOGY:      Culture - Blood (collected 11 Apr 2024 23:55)  Source: .Blood Blood-Venous  Preliminary Report (12 Apr 2024 13:00):    No growth at 12 hours    Culture - Urine (collected 10 Apr 2024 19:02)  Source: Clean Catch None  Final Report (11 Apr 2024 22:52):    <10,000 CFU/mL Normal Urogenital Ceci    Urinalysis with Rflx Culture (collected 10 Apr 2024 19:02)    Culture - Blood (collected 10 Apr 2024 18:00)  Source: .Blood Blood  Preliminary Report (12 Apr 2024 19:00):    No growth at 2 days.    Culture - Blood (collected 10 Apr 2024 17:50)  Source: .Blood Blood  Preliminary Report (12 Apr 2024 19:00):    No growth at 2 days.    Culture - Urine (collected 08 Apr 2024 14:59)  Source: Clean Catch Clean Catch (Midstream)  Final Report (10 Apr 2024 08:36):    <10,000 CFU/mL Normal Urogenital Ceci      RADIOLOGY & ADDITIONAL STUDIES:    ACC: [id number] EXAM:  CT ABDOMEN AND PELVIS IC   ORDERED BY: VERONIKA WHITE     PROCEDURE DATE:  04/11/2024          INTERPRETATION:  CLINICAL INFORMATION: Fevers and tachycardia. Crohn's   disease with known intersphincteric and rectovaginal fistulas. Recent   exam under anesthesia, rectal stricture dilatation and flex sigmoidoscopy.    COMPARISON: CT abdomen/pelvis 4/8/2024    CONTRAST/COMPLICATIONS:  IV Contrast: Isovue 370  90 cc administered   10 cc discarded  Oral Contrast: NONE  Complications: None reported at time of study completion    PROCEDURE:  CT of the Abdomen and Pelvis was performed.  Sagittal and coronal reformats were performed.    FINDINGS:  LOWER CHEST: Within normal limits.    LIVER: Within normal limits.  BILE DUCTS: Normalcaliber.  GALLBLADDER: Within normal limits.  SPLEEN: Within normal limits.  PANCREAS: Within normal limits.  ADRENALS: Within normal limits.  KIDNEYS/URETERS: Left renal cyst. No hydronephrosis or obstructive renal   calculi.    BLADDER: Within normal limits.  REPRODUCTIVE ORGANS: Malpositioned IUD again noted. No suspicious adnexal   mass. Left corpus luteum.    BOWEL: No bowel obstruction. Wall thickening with mucosal   hyperenhancement involving 3.7 cm length of the terminal ileum. Mild wall   thickening with mucosal enhancement of the rectum. Appendix is normal.   Previous rectovaginal and right perianal fistulas are not well visualized   on the current exam.  PERITONEUM: No ascites.  VESSELS: Within normal limits.  RETROPERITONEUM/LYMPHNODES: No lymphadenopathy.  ABDOMINAL WALL: Within normal limits.  BONES: Within normal limits.    IMPRESSION:  No significant change since 4/8/2024.    Unchanged mild inflammatory changes of the terminal ileum and rectum. No   bowel obstruction.        --- End of Report ---            ALFONSO JASSO MD; Attending Radiologist  This document has been electronically signed. Apr 11 2024  5:31PM

## 2024-04-12 NOTE — DIETITIAN INITIAL EVALUATION ADULT - ORAL INTAKE PTA/DIET HISTORY
Visited pt at bedside on 9UR. States that at home pt typically consumes 3 meals a day. Does not follow any therapeutic diet, however states that she limits red meat as a personal preference. No cultural, ethnic, [employer] food preferences noted. Confirms allergy to nuts, seafood, and soy. States that she becomes anaphylatic.

## 2024-04-12 NOTE — CONSULT NOTE ADULT - ASSESSMENT
35 yo F w/ stricturing and fistulizing Crohn's disease previously on mesalamine & infliximab, off for 8 weeks awaiting entry into an ongoing clinical trial p/w 1 week of worsening lower abdominal pain, constipation and decreased PO intake, incomplete bowel emptying now s/p EUA with rectal dilation and flexible sig done on 4/9, c/f proctitis; noted to be febrile on 4/10 and 4/11. She was placed on empiric IV Piptazo 4.5 gm q8h.  4/8 WBC was 11.42 which has since normalized.   4/10 UA w 7 wbc, small LE but pt denies dysuria/LUTS. UCx with uro dell.   RVP neg  4/10 and 4/11 BCx ngtd. 4/10 CXR clear lungs.   4/12 ALT/AST 64/62, ALP WNL. Has RUQ tenderness on exam.   CT A/P c/f ileitis and proctitis.     Recs:  - Continue IV Piptazo 4.5gm q8h  - Dry heaving since Piptazo started but states that she has not been eating much and would like to continue same Abx and try with oral intake before considering Abx switch; says flagyl makes her nauseous  - F/u BCx  - Check US Abdomen, RUQ TTP on exam    ID team 1 will follow

## 2024-04-12 NOTE — CONSULT NOTE ADULT - REASON FOR ADMISSION
Rectal stricture with obstructive symptoms

## 2024-04-12 NOTE — DIETITIAN INITIAL EVALUATION ADULT - OTHER CALCULATIONS
Based on Standards of Care pt exceeds % IBW (123%) thus ideal body weight (61.2kg) used for all calculations. Needs adjusted for clinical course. Fluid recs per team.

## 2024-04-12 NOTE — DIETITIAN INITIAL EVALUATION ADULT - NSFNSGIIOFT_GEN_A_CORE
04-11-24 @ 07:01  -  04-12-24 @ 07:00  --------------------------------------------------------  OUT:    Stool (mL): 1 mL  Total OUT: 1 mL    Total NET: -1 mL

## 2024-04-12 NOTE — DIETITIAN INITIAL EVALUATION ADULT - NSFNSGIASSESSMENTFT_GEN_A_CORE
No issues with nausea, vomiting, diarrhea, constipation reported at time of visit. Last BM noted on 4/12 per pt. Pt is currently on a bowel regimen: polyethylene glycol

## 2024-04-13 LAB
ALBUMIN SERPL ELPH-MCNC: 3.1 G/DL — LOW (ref 3.3–5)
ALP SERPL-CCNC: 130 U/L — HIGH (ref 40–120)
ALT FLD-CCNC: 53 U/L — HIGH (ref 10–45)
ANION GAP SERPL CALC-SCNC: 9 MMOL/L — SIGNIFICANT CHANGE UP (ref 5–17)
AST SERPL-CCNC: 44 U/L — HIGH (ref 10–40)
BILIRUB SERPL-MCNC: 0.3 MG/DL — SIGNIFICANT CHANGE UP (ref 0.2–1.2)
BUN SERPL-MCNC: 4 MG/DL — LOW (ref 7–23)
CALCIUM SERPL-MCNC: 8.6 MG/DL — SIGNIFICANT CHANGE UP (ref 8.4–10.5)
CALPROTECTIN STL-MCNT: 1500 UG/G — HIGH (ref 0–120)
CHLORIDE SERPL-SCNC: 98 MMOL/L — SIGNIFICANT CHANGE UP (ref 96–108)
CO2 SERPL-SCNC: 25 MMOL/L — SIGNIFICANT CHANGE UP (ref 22–31)
CREAT SERPL-MCNC: 1.06 MG/DL — SIGNIFICANT CHANGE UP (ref 0.5–1.3)
EGFR: 70 ML/MIN/1.73M2 — SIGNIFICANT CHANGE UP
GLUCOSE SERPL-MCNC: 110 MG/DL — HIGH (ref 70–99)
HAV IGM SER-ACNC: SIGNIFICANT CHANGE UP
HBV CORE IGM SER-ACNC: SIGNIFICANT CHANGE UP
HBV SURFACE AG SER-ACNC: SIGNIFICANT CHANGE UP
HCT VFR BLD CALC: 31.5 % — LOW (ref 34.5–45)
HCV AB S/CO SERPL IA: 0.04 S/CO — SIGNIFICANT CHANGE UP
HCV AB SERPL-IMP: SIGNIFICANT CHANGE UP
HGB BLD-MCNC: 10.4 G/DL — LOW (ref 11.5–15.5)
MAGNESIUM SERPL-MCNC: 2.2 MG/DL — SIGNIFICANT CHANGE UP (ref 1.6–2.6)
MCHC RBC-ENTMCNC: 27.3 PG — SIGNIFICANT CHANGE UP (ref 27–34)
MCHC RBC-ENTMCNC: 33 GM/DL — SIGNIFICANT CHANGE UP (ref 32–36)
MCV RBC AUTO: 82.7 FL — SIGNIFICANT CHANGE UP (ref 80–100)
NRBC # BLD: 0 /100 WBCS — SIGNIFICANT CHANGE UP (ref 0–0)
PHOSPHATE SERPL-MCNC: 2.5 MG/DL — SIGNIFICANT CHANGE UP (ref 2.5–4.5)
PLATELET # BLD AUTO: 388 K/UL — SIGNIFICANT CHANGE UP (ref 150–400)
POTASSIUM SERPL-MCNC: 3.3 MMOL/L — LOW (ref 3.5–5.3)
POTASSIUM SERPL-SCNC: 3.3 MMOL/L — LOW (ref 3.5–5.3)
PROT SERPL-MCNC: 7.6 G/DL — SIGNIFICANT CHANGE UP (ref 6–8.3)
RBC # BLD: 3.81 M/UL — SIGNIFICANT CHANGE UP (ref 3.8–5.2)
RBC # FLD: 13.2 % — SIGNIFICANT CHANGE UP (ref 10.3–14.5)
SODIUM SERPL-SCNC: 132 MMOL/L — LOW (ref 135–145)
WBC # BLD: 7.92 K/UL — SIGNIFICANT CHANGE UP (ref 3.8–10.5)
WBC # FLD AUTO: 7.92 K/UL — SIGNIFICANT CHANGE UP (ref 3.8–10.5)

## 2024-04-13 PROCEDURE — 99232 SBSQ HOSP IP/OBS MODERATE 35: CPT

## 2024-04-13 RX ORDER — POTASSIUM CHLORIDE 20 MEQ
20 PACKET (EA) ORAL
Refills: 0 | Status: COMPLETED | OUTPATIENT
Start: 2024-04-13 | End: 2024-04-13

## 2024-04-13 RX ADMIN — Medication 650 MILLIGRAM(S): at 05:58

## 2024-04-13 RX ADMIN — Medication 650 MILLIGRAM(S): at 00:16

## 2024-04-13 RX ADMIN — Medication 20 MILLIEQUIVALENT(S): at 14:40

## 2024-04-13 RX ADMIN — PIPERACILLIN AND TAZOBACTAM 25 GRAM(S): 4; .5 INJECTION, POWDER, LYOPHILIZED, FOR SOLUTION INTRAVENOUS at 06:22

## 2024-04-13 RX ADMIN — POLYETHYLENE GLYCOL 3350 17 GRAM(S): 17 POWDER, FOR SOLUTION ORAL at 21:47

## 2024-04-13 RX ADMIN — ONDANSETRON 4 MILLIGRAM(S): 8 TABLET, FILM COATED ORAL at 15:58

## 2024-04-13 RX ADMIN — Medication 650 MILLIGRAM(S): at 06:58

## 2024-04-13 RX ADMIN — Medication 20 MILLIEQUIVALENT(S): at 12:08

## 2024-04-13 RX ADMIN — POLYETHYLENE GLYCOL 3350 17 GRAM(S): 17 POWDER, FOR SOLUTION ORAL at 10:36

## 2024-04-13 NOTE — PROGRESS NOTE ADULT - ASSESSMENT
35 yo F w/ stricturing and fistulizing Crohn's disease previously on mesalamine & infliximab, off for 8 weeks awaiting entry into an ongoing clinical trial p/w 1 week of worsening lower abdominal pain, constipation and decreased PO intake, incomplete bowel emptying now s/p EUA with rectal dilation and flexible sig done on 4/9, c/f proctitis; noted to be febrile on 4/10 and 4/11. She was placed on empiric IV Piptazo 4.5 gm q8h started 4/12.  4/8 WBC was 11.42 which has since normalized.   4/10 UA w 7 wbc, small LE but pt denied dysuria/LUTS. UCx with uro dell.   RVP neg  4/10 and 4/11 BCx ngtd. 4/10 CXR clear lungs.   4/12 ALT/AST 64/62, ALP WNL. Had RUQ tenderness on exam yesterday, RUQ US w/o c/f acute lashell.   CT A/P c/f ileitis and proctitis.   Dry heaving since Piptazo started. D/w surgical team.  Given no clear source, nausea and emesis 2/2 abx, will DC Piptazo.  ? Drug fever    Recs:  F/u BCx  Monitor off Abx    ID team 1 will follow

## 2024-04-13 NOTE — PROGRESS NOTE ADULT - SUBJECTIVE AND OBJECTIVE BOX
SUBJECTIVE:  Patient was evaluated at bedside by surgery team. Patient is frustrated and wants to go home. Patient spiked a fever overnight 102 requiring repeat BCx. Patient denies pain, nausea, emesis, chills, dizziness, or chest pain. Patient confirms flatus and BM    MEDICATIONS  (STANDING):  heparin   Injectable 5000 Unit(s) SubCutaneous every 8 hours  influenza   Vaccine 0.5 milliLiter(s) IntraMuscular once  polyethylene glycol 3350 17 Gram(s) Oral <User Schedule>    MEDICATIONS  (PRN):  acetaminophen     Tablet .. 650 milliGRAM(s) Oral every 6 hours PRN Mild Pain (1 - 3), Moderate Pain (4 - 6)  ondansetron Injectable 4 milliGRAM(s) IV Push every 6 hours PRN Nausea and/or Vomiting      Vital Signs Last 24 Hrs  T(C): 37.5 (13 Apr 2024 08:17), Max: 38.9 (13 Apr 2024 05:37)  T(F): 99.5 (13 Apr 2024 08:17), Max: 102 (13 Apr 2024 05:37)  HR: 92 (13 Apr 2024 08:17) (92 - 104)  BP: 102/66 (13 Apr 2024 08:17) (95/53 - 107/71)  BP(mean): --  RR: 16 (13 Apr 2024 08:17) (16 - 18)  SpO2: 95% (13 Apr 2024 08:17) (95% - 100%)    Parameters below as of 13 Apr 2024 08:17  Patient On (Oxygen Delivery Method): room air        Physical Exam:  General: NAD, resting comfortably in bed  Pulmonary: Nonlabored breathing, no respiratory distress  Cardiovascular: NSR  Abdominal: soft, ND, TTP at RUQ  Extremities: WWP, normal strength  Neuro: A/O x 3, CNs II-XII grossly intact, no focal deficits, normal motor/sensation  Pulses: palpable distal pulses    I&O's Summary    12 Apr 2024 07:01  -  13 Apr 2024 07:00  --------------------------------------------------------  IN: 1255 mL / OUT: 1050 mL / NET: 205 mL    13 Apr 2024 07:01  -  13 Apr 2024 10:48  --------------------------------------------------------  IN: 265 mL / OUT: 0 mL / NET: 265 mL        LABS:                        10.4   7.92  )-----------( 388      ( 13 Apr 2024 05:30 )             31.5     04-13    132<L>  |  98  |  4<L>  ----------------------------<  110<H>  3.3<L>   |  25  |  1.06    Ca    8.6      13 Apr 2024 05:30  Phos  2.5     04-13  Mg     2.2     04-13    TPro  7.6  /  Alb  3.1<L>  /  TBili  0.3  /  DBili  x   /  AST  44<H>  /  ALT  53<H>  /  AlkPhos  130<H>  04-13      Urinalysis Basic - ( 13 Apr 2024 05:30 )    Color: x / Appearance: x / SG: x / pH: x  Gluc: 110 mg/dL / Ketone: x  / Bili: x / Urobili: x   Blood: x / Protein: x / Nitrite: x   Leuk Esterase: x / RBC: x / WBC x   Sq Epi: x / Non Sq Epi: x / Bacteria: x      CAPILLARY BLOOD GLUCOSE        LIVER FUNCTIONS - ( 13 Apr 2024 05:30 )  Alb: 3.1 g/dL / Pro: 7.6 g/dL / ALK PHOS: 130 U/L / ALT: 53 U/L / AST: 44 U/L / GGT: x             RADIOLOGY & ADDITIONAL STUDIES:

## 2024-04-13 NOTE — PROGRESS NOTE ADULT - ASSESSMENT
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 Now s/p EUA with rectal dilation and flex sig on 4/9.    Reg diet  Pain, nausea control PRN   Miralax TID  OOBA/IS  SCDs/SQH   AM labs

## 2024-04-13 NOTE — PROGRESS NOTE ADULT - SUBJECTIVE AND OBJECTIVE BOX
INTERVAL HPI/OVERNIGHT EVENTS: Febrile, nauseous, vomiting no other somatic complaints.     CONSTITUTIONAL:  +fever, no chills, night sweats  EYES:  No photophobia or visual changes  CARDIOVASCULAR:  No chest pain  RESPIRATORY:  No cough, wheezing, or SOB   GASTROINTESTINAL:  + nausea, vomiting, no diarrhea, constipation, or abdominal pain  GENITOURINARY:  No frequency, urgency, dysuria or hematuria  NEUROLOGIC:  No headache, lightheadedness      ANTIBIOTICS/RELEVANT:          Vital Signs Last 24 Hrs  T(C): 37.3 (13 Apr 2024 23:35), Max: 38.9 (13 Apr 2024 05:37)  T(F): 99.2 (13 Apr 2024 23:35), Max: 102 (13 Apr 2024 05:37)  HR: 92 (13 Apr 2024 23:35) (92 - 100)  BP: 109/69 (13 Apr 2024 23:35) (95/53 - 117/71)  BP(mean): --  RR: 17 (13 Apr 2024 23:35) (16 - 18)  SpO2: 96% (13 Apr 2024 23:35) (95% - 99%)    Parameters below as of 13 Apr 2024 23:35  Patient On (Oxygen Delivery Method): room air        PHYSICAL EXAM:  Constitutional:  Alert  Eyes:  Sclerae anicteric, conjunctivae clear, PERRL  Ear/Nose/Throat:  No nasal exudate or sinus tenderness;  No buccal mucosal lesions, no pharyngeal erythema or exudate	  Neck:  Supple, no adenopathy  Respiratory:  Clear bilaterally  Cardiovascular:  RRR, S1S2, no murmur appreciated  Gastrointestinal:  Symmetric, normoactive BS, soft, NT, no masses, guarding or rebound.  No HSM  Extremities:  No edema      LABS:                        10.4   7.92  )-----------( 388      ( 13 Apr 2024 05:30 )             31.5         04-13    132<L>  |  98  |  4<L>  ----------------------------<  110<H>  3.3<L>   |  25  |  1.06    Ca    8.6      13 Apr 2024 05:30  Phos  2.5     04-13  Mg     2.2     04-13    TPro  7.6  /  Alb  3.1<L>  /  TBili  0.3  /  DBili  x   /  AST  44<H>  /  ALT  53<H>  /  AlkPhos  130<H>  04-13      Urinalysis Basic - ( 13 Apr 2024 05:30 )    Color: x / Appearance: x / SG: x / pH: x  Gluc: 110 mg/dL / Ketone: x  / Bili: x / Urobili: x   Blood: x / Protein: x / Nitrite: x   Leuk Esterase: x / RBC: x / WBC x   Sq Epi: x / Non Sq Epi: x / Bacteria: x        MICROBIOLOGY:  Culture Results:   No growth at 12 hours (04-13-24 @ 07:04)  Culture Results:   No growth at 12 hours (04-13-24 @ 07:04)        RADIOLOGY & ADDITIONAL STUDIES:

## 2024-04-13 NOTE — PROGRESS NOTE ADULT - SUBJECTIVE AND OBJECTIVE BOX
GI Consult Progress Note:     OVERNIGHT EVENTS: LAURIE.    SUBJECTIVE / INTERVAL HPI:   Patient seen and examined at bedside. States that she is upset she is still in the hospital. Experiencing some nausea from Zosyn but otherwise reports that she feels fine. Has no acute complaints at this time. Still intermittently having fevers as high as 102.       VITAL SIGNS:  Vital Signs Last 24 Hrs  T(C): 37.4 (13 Apr 2024 12:58), Max: 38.9 (13 Apr 2024 05:37)  T(F): 99.4 (13 Apr 2024 12:58), Max: 102 (13 Apr 2024 05:37)  HR: 96 (13 Apr 2024 12:58) (92 - 104)  BP: 108/70 (13 Apr 2024 12:58) (95/53 - 108/70)  BP(mean): --  RR: 17 (13 Apr 2024 12:58) (16 - 18)  SpO2: 99% (13 Apr 2024 12:58) (95% - 100%)    Parameters below as of 13 Apr 2024 12:58  Patient On (Oxygen Delivery Method): room air        04-12-24 @ 07:01  -  04-13-24 @ 07:00  --------------------------------------------------------  IN: 1255 mL / OUT: 1050 mL / NET: 205 mL    04-13-24 @ 07:01  - 04-13-24 @ 14:43  --------------------------------------------------------  IN: 265 mL / OUT: 0 mL / NET: 265 mL      PHYSICAL EXAM:  General: No acute distress  Lungs: Normal respiratory effort and no intercostal retractions  Cardiovascular: RRR  Abdomen: Soft, non-tender, non-distended  Neurological: Alert and oriented x3  Skin: Warm and dry. No obvious rash     MEDICATIONS:  MEDICATIONS  (STANDING):  heparin   Injectable 5000 Unit(s) SubCutaneous every 8 hours  influenza   Vaccine 0.5 milliLiter(s) IntraMuscular once  polyethylene glycol 3350 17 Gram(s) Oral <User Schedule>    MEDICATIONS  (PRN):  acetaminophen     Tablet .. 650 milliGRAM(s) Oral every 6 hours PRN Mild Pain (1 - 3), Moderate Pain (4 - 6)  ondansetron Injectable 4 milliGRAM(s) IV Push every 6 hours PRN Nausea and/or Vomiting      ALLERGIES:  Allergies    No Known Drug Allergies  Nuts (Anaphylaxis)  Seafood (Anaphylaxis)  Soy (Anaphylaxis)  Felt throat closing after getting &quot;oral contrast&quot; many yrs ago. Has had oral contrast since then w/o issues. Has never had reaction to IV contrast. (Unknown)    Intolerances        LABS:                        10.4   7.92  )-----------( 388      ( 13 Apr 2024 05:30 )             31.5     04-13    132<L>  |  98  |  4<L>  ----------------------------<  110<H>  3.3<L>   |  25  |  1.06    Ca    8.6      13 Apr 2024 05:30  Phos  2.5     04-13  Mg     2.2     04-13    TPro  7.6  /  Alb  3.1<L>  /  TBili  0.3  /  DBili  x   /  AST  44<H>  /  ALT  53<H>  /  AlkPhos  130<H>  04-13      Urinalysis Basic - ( 13 Apr 2024 05:30 )    Color: x / Appearance: x / SG: x / pH: x  Gluc: 110 mg/dL / Ketone: x  / Bili: x / Urobili: x   Blood: x / Protein: x / Nitrite: x   Leuk Esterase: x / RBC: x / WBC x   Sq Epi: x / Non Sq Epi: x / Bacteria: x      CAPILLARY BLOOD GLUCOSE  RADIOLOGY & ADDITIONAL TESTS: Reviewed.

## 2024-04-13 NOTE — PROGRESS NOTE ADULT - ASSESSMENT
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 Admitted to surgical service for rectal stricture dilation with Dr. Talamantes.     CRP this admission 38. Underwent dilation of rectal stricture with colorectal surgery on 04/09. Was dilated to the size of 58Fr. Continues to be febrile but UA, blood cultures, and CXR negative. Now broadened to Zosyn.     CT abdomen/pelvis:   - No significant change since 4/8/2024.  - Unchanged mild inflammatory changes of the terminal ileum and rectum.  - No bowel obstruction.    Recommendations:   - f/u fecal calprotectin  - abx per ID team, currently on Zosyn   - consider gallium scan in the setting of persistent fevers with no clear source or possibility of a drug fever   - consider rheum consult as fevers possibly 2/2 to autoimmune disease separate from Crohn's   - continue Zofran for nausea and Miralax for constipation   - has a planned date for colonoscopy with Dr. Wu next Tuesday (04/16)     Case discussed with Dr. Wilhelm. GI Team will continue to follow.     Gisella Quigley D.O.   Gastroenterology Fellow  Weekday 7am-5pm Pager: [phone]  Weeknights/Weekend/Holiday Coverage: Please call the  for contact info.

## 2024-04-13 NOTE — PROGRESS NOTE ADULT - NUTRITIONAL ASSESSMENT
This patient has been assessed with a concern for Malnutrition and has been determined to have a diagnosis/diagnoses of Moderate protein-calorie malnutrition.    This patient is being managed with:   Diet Regular-  Entered: Apr 9 2024  4:52PM

## 2024-04-14 ENCOUNTER — TRANSCRIPTION ENCOUNTER (OUTPATIENT)
Age: 34
End: 2024-04-14

## 2024-04-14 VITALS
TEMPERATURE: 99 F | OXYGEN SATURATION: 97 % | SYSTOLIC BLOOD PRESSURE: 97 MMHG | HEART RATE: 96 BPM | DIASTOLIC BLOOD PRESSURE: 63 MMHG | RESPIRATION RATE: 19 BRPM

## 2024-04-14 LAB
ALBUMIN SERPL ELPH-MCNC: 3 G/DL — LOW (ref 3.3–5)
ALP SERPL-CCNC: 137 U/L — HIGH (ref 40–120)
ALT FLD-CCNC: 49 U/L — HIGH (ref 10–45)
ANION GAP SERPL CALC-SCNC: 11 MMOL/L — SIGNIFICANT CHANGE UP (ref 5–17)
AST SERPL-CCNC: 41 U/L — HIGH (ref 10–40)
BILIRUB DIRECT SERPL-MCNC: 0.2 MG/DL — SIGNIFICANT CHANGE UP (ref 0–0.3)
BILIRUB INDIRECT FLD-MCNC: 0 MG/DL — LOW (ref 0.2–1)
BILIRUB SERPL-MCNC: 0.2 MG/DL — SIGNIFICANT CHANGE UP (ref 0.2–1.2)
BUN SERPL-MCNC: 4 MG/DL — LOW (ref 7–23)
CALCIUM SERPL-MCNC: 8.9 MG/DL — SIGNIFICANT CHANGE UP (ref 8.4–10.5)
CHLORIDE SERPL-SCNC: 98 MMOL/L — SIGNIFICANT CHANGE UP (ref 96–108)
CO2 SERPL-SCNC: 25 MMOL/L — SIGNIFICANT CHANGE UP (ref 22–31)
CREAT SERPL-MCNC: 0.85 MG/DL — SIGNIFICANT CHANGE UP (ref 0.5–1.3)
EGFR: 91 ML/MIN/1.73M2 — SIGNIFICANT CHANGE UP
GLUCOSE SERPL-MCNC: 101 MG/DL — HIGH (ref 70–99)
HCT VFR BLD CALC: 32.1 % — LOW (ref 34.5–45)
HGB BLD-MCNC: 10.7 G/DL — LOW (ref 11.5–15.5)
MAGNESIUM SERPL-MCNC: 2.3 MG/DL — SIGNIFICANT CHANGE UP (ref 1.6–2.6)
MCHC RBC-ENTMCNC: 27.4 PG — SIGNIFICANT CHANGE UP (ref 27–34)
MCHC RBC-ENTMCNC: 33.3 GM/DL — SIGNIFICANT CHANGE UP (ref 32–36)
MCV RBC AUTO: 82.1 FL — SIGNIFICANT CHANGE UP (ref 80–100)
NRBC # BLD: 0 /100 WBCS — SIGNIFICANT CHANGE UP (ref 0–0)
PHOSPHATE SERPL-MCNC: 2.8 MG/DL — SIGNIFICANT CHANGE UP (ref 2.5–4.5)
PLATELET # BLD AUTO: 359 K/UL — SIGNIFICANT CHANGE UP (ref 150–400)
POTASSIUM SERPL-MCNC: 3.7 MMOL/L — SIGNIFICANT CHANGE UP (ref 3.5–5.3)
POTASSIUM SERPL-SCNC: 3.7 MMOL/L — SIGNIFICANT CHANGE UP (ref 3.5–5.3)
PROT SERPL-MCNC: 7.3 G/DL — SIGNIFICANT CHANGE UP (ref 6–8.3)
RBC # BLD: 3.91 M/UL — SIGNIFICANT CHANGE UP (ref 3.8–5.2)
RBC # FLD: 13.2 % — SIGNIFICANT CHANGE UP (ref 10.3–14.5)
SODIUM SERPL-SCNC: 134 MMOL/L — LOW (ref 135–145)
WBC # BLD: 9.93 K/UL — SIGNIFICANT CHANGE UP (ref 3.8–10.5)
WBC # FLD AUTO: 9.93 K/UL — SIGNIFICANT CHANGE UP (ref 3.8–10.5)

## 2024-04-14 PROCEDURE — 76705 ECHO EXAM OF ABDOMEN: CPT

## 2024-04-14 PROCEDURE — 93005 ELECTROCARDIOGRAM TRACING: CPT

## 2024-04-14 PROCEDURE — 99285 EMERGENCY DEPT VISIT HI MDM: CPT

## 2024-04-14 PROCEDURE — 85025 COMPLETE CBC W/AUTO DIFF WBC: CPT

## 2024-04-14 PROCEDURE — 84702 CHORIONIC GONADOTROPIN TEST: CPT

## 2024-04-14 PROCEDURE — 96374 THER/PROPH/DIAG INJ IV PUSH: CPT

## 2024-04-14 PROCEDURE — 85610 PROTHROMBIN TIME: CPT

## 2024-04-14 PROCEDURE — 83690 ASSAY OF LIPASE: CPT

## 2024-04-14 PROCEDURE — 87040 BLOOD CULTURE FOR BACTERIA: CPT

## 2024-04-14 PROCEDURE — 85730 THROMBOPLASTIN TIME PARTIAL: CPT

## 2024-04-14 PROCEDURE — 74177 CT ABD & PELVIS W/CONTRAST: CPT | Mod: MC

## 2024-04-14 PROCEDURE — 0225U NFCT DS DNA&RNA 21 SARSCOV2: CPT

## 2024-04-14 PROCEDURE — 83735 ASSAY OF MAGNESIUM: CPT

## 2024-04-14 PROCEDURE — 84100 ASSAY OF PHOSPHORUS: CPT

## 2024-04-14 PROCEDURE — 87086 URINE CULTURE/COLONY COUNT: CPT

## 2024-04-14 PROCEDURE — 80053 COMPREHEN METABOLIC PANEL: CPT

## 2024-04-14 PROCEDURE — 85027 COMPLETE CBC AUTOMATED: CPT

## 2024-04-14 PROCEDURE — 71045 X-RAY EXAM CHEST 1 VIEW: CPT

## 2024-04-14 PROCEDURE — 81001 URINALYSIS AUTO W/SCOPE: CPT

## 2024-04-14 PROCEDURE — 83605 ASSAY OF LACTIC ACID: CPT

## 2024-04-14 PROCEDURE — 80076 HEPATIC FUNCTION PANEL: CPT

## 2024-04-14 PROCEDURE — 80048 BASIC METABOLIC PNL TOTAL CA: CPT

## 2024-04-14 PROCEDURE — 86140 C-REACTIVE PROTEIN: CPT

## 2024-04-14 PROCEDURE — 36415 COLL VENOUS BLD VENIPUNCTURE: CPT

## 2024-04-14 PROCEDURE — 99231 SBSQ HOSP IP/OBS SF/LOW 25: CPT

## 2024-04-14 PROCEDURE — 83993 ASSAY FOR CALPROTECTIN FECAL: CPT

## 2024-04-14 PROCEDURE — 80074 ACUTE HEPATITIS PANEL: CPT

## 2024-04-14 RX ORDER — POTASSIUM CHLORIDE 20 MEQ
40 PACKET (EA) ORAL ONCE
Refills: 0 | Status: COMPLETED | OUTPATIENT
Start: 2024-04-14 | End: 2024-04-14

## 2024-04-14 RX ORDER — POTASSIUM PHOSPHATE, MONOBASIC POTASSIUM PHOSPHATE, DIBASIC 236; 224 MG/ML; MG/ML
15 INJECTION, SOLUTION INTRAVENOUS ONCE
Refills: 0 | Status: COMPLETED | OUTPATIENT
Start: 2024-04-14 | End: 2024-04-14

## 2024-04-14 RX ORDER — MESALAMINE 400 MG
1 TABLET, DELAYED RELEASE (ENTERIC COATED) ORAL
Qty: 30 | Refills: 0
Start: 2024-04-14 | End: 2024-05-13

## 2024-04-14 RX ADMIN — POLYETHYLENE GLYCOL 3350 17 GRAM(S): 17 POWDER, FOR SOLUTION ORAL at 05:32

## 2024-04-14 RX ADMIN — Medication 650 MILLIGRAM(S): at 10:50

## 2024-04-14 RX ADMIN — Medication 650 MILLIGRAM(S): at 11:50

## 2024-04-14 RX ADMIN — Medication 40 MILLIEQUIVALENT(S): at 09:54

## 2024-04-14 NOTE — PROGRESS NOTE ADULT - TIME BILLING
Crohns, fever
Time spent includes direct patient care  (interview and examination of patient), discussion with other providers, support staff and/or patient's family members, review of medical records, ordering diagnostic tests and analyzing results, and documentation.
Fever, vomiting

## 2024-04-14 NOTE — DISCHARGE NOTE NURSING/CASE MANAGEMENT/SOCIAL WORK - PATIENT PORTAL LINK FT
You can access the FollowMyHealth Patient Portal offered by [facility] by registering at the following website: [email or web address]. By joining [employer]’s FollowMyHealth portal, you will also be able to view your health information using other applications (apps) compatible with our system.

## 2024-04-14 NOTE — PROGRESS NOTE ADULT - SUBJECTIVE AND OBJECTIVE BOX
INTERVAL HPI/OVERNIGHT EVENTS: Afebrile, denies somatic complaints.     CONSTITUTIONAL:  No fever, chills, night sweats  EYES:  No photophobia or visual changes  CARDIOVASCULAR:  No chest pain  RESPIRATORY:  No cough, wheezing, or SOB   GASTROINTESTINAL:  No nausea, vomiting, diarrhea, constipation, or abdominal pain  GENITOURINARY:  No frequency, urgency, dysuria or hematuria  NEUROLOGIC:  No headache, lightheadedness      ANTIBIOTICS/RELEVANT:          Vital Signs Last 24 Hrs  T(C): 37 (14 Apr 2024 12:24), Max: 37.8 (14 Apr 2024 05:17)  T(F): 98.6 (14 Apr 2024 12:24), Max: 100.1 (14 Apr 2024 05:17)  HR: 96 (14 Apr 2024 12:24) (91 - 110)  BP: 97/63 (14 Apr 2024 12:24) (97/63 - 107/70)  BP(mean): --  RR: 19 (14 Apr 2024 12:24) (18 - 19)  SpO2: 97% (14 Apr 2024 12:24) (97% - 98%)    Parameters below as of 14 Apr 2024 12:24  Patient On (Oxygen Delivery Method): room air        PHYSICAL EXAM:  Constitutional:  Alert  Eyes:  Sclerae anicteric, conjunctivae clear, PERRL  Ear/Nose/Throat:  No nasal exudate or sinus tenderness;  No buccal mucosal lesions, no pharyngeal erythema or exudate	  Neck:  Supple, no adenopathy  Respiratory:  Clear bilaterally  Cardiovascular:  RRR, S1S2, no murmur appreciated  Gastrointestinal:  Symmetric, normoactive BS, soft, NT, no masses, guarding or rebound.  No HSM  Extremities:  No edema      LABS:                        10.7   9.93  )-----------( 359      ( 14 Apr 2024 05:30 )             32.1         04-14    134<L>  |  98  |  4<L>  ----------------------------<  101<H>  3.7   |  25  |  0.85    Ca    8.9      14 Apr 2024 05:30  Phos  2.8     04-14  Mg     2.3     04-14    TPro  7.3  /  Alb  3.0<L>  /  TBili  0.2  /  DBili  0.2  /  AST  41<H>  /  ALT  49<H>  /  AlkPhos  137<H>  04-14      Urinalysis Basic - ( 14 Apr 2024 05:30 )    Color: x / Appearance: x / SG: x / pH: x  Gluc: 101 mg/dL / Ketone: x  / Bili: x / Urobili: x   Blood: x / Protein: x / Nitrite: x   Leuk Esterase: x / RBC: x / WBC x   Sq Epi: x / Non Sq Epi: x / Bacteria: x        MICROBIOLOGY:        RADIOLOGY & ADDITIONAL STUDIES:

## 2024-04-14 NOTE — DISCHARGE NOTE NURSING/CASE MANAGEMENT/SOCIAL WORK - NSDCFUADDAPPT_GEN_ALL_CORE_FT
Please follow up with Dr. Talamantes in 1-2 weeks, you may call [phone] to schedule an appointment.    Please follow up with Dr. Wu, you may call [phone] to schedule an appointment and schedule a colonoscopy.

## 2024-04-14 NOTE — DISCHARGE NOTE NURSING/CASE MANAGEMENT/SOCIAL WORK - NSDCPEFALRISK_GEN_ALL_CORE
For information on Fall & Injury Prevention, visit: [email or web address] OR  [email or web address] OR  https://www.cdc.gov/steadi/patient.html

## 2024-04-14 NOTE — PROGRESS NOTE ADULT - REASON FOR ADMISSION
Rectal stricture with obstructive symptoms

## 2024-04-14 NOTE — PROGRESS NOTE ADULT - ASSESSMENT
33 yo F w/ stricturing and fistulizing Crohn's disease previously on mesalamine & infliximab, off for 8 weeks awaiting entry into an ongoing clinical trial p/w 1 week of worsening lower abdominal pain, constipation and decreased PO intake, incomplete bowel emptying now s/p EUA with rectal dilation and flexible sig done on 4/9, c/f proctitis; noted to be febrile on 4/10 and 4/11. She was placed on empiric IV Piptazo 4.5 gm q8h started 4/12.  4/8 WBC was 11.42 which has since normalized.   4/10 UA w 7 wbc, small LE but pt denied dysuria/LUTS. UCx with uro dell.   RVP neg  4/10 and 4/11 BCx ngtd. 4/10 CXR clear lungs.   4/12 ALT/AST 64/62, ALP WNL. Had RUQ tenderness on exam yesterday, RUQ US w/o c/f acute lashell.   CT A/P c/f ileitis and proctitis.   Dry heaving since Piptazo started. Given no clear source, nausea and emesis 2/2 abx, Abx dc'ed.  Afebrile since then. ? Drug fever  Monitor off Abx    ID Team 1 will sign off, please call back as needed.

## 2024-04-14 NOTE — PROGRESS NOTE ADULT - SUBJECTIVE AND OBJECTIVE BOX
SUBJECTIVE:      MEDICATIONS  (STANDING):  heparin   Injectable 5000 Unit(s) SubCutaneous every 8 hours  influenza   Vaccine 0.5 milliLiter(s) IntraMuscular once  polyethylene glycol 3350 17 Gram(s) Oral <User Schedule>    MEDICATIONS  (PRN):  acetaminophen     Tablet .. 650 milliGRAM(s) Oral every 6 hours PRN Mild Pain (1 - 3), Moderate Pain (4 - 6)  ondansetron Injectable 4 milliGRAM(s) IV Push every 6 hours PRN Nausea and/or Vomiting      Vital Signs Last 24 Hrs  T(C): 37.8 (14 Apr 2024 05:17), Max: 37.8 (14 Apr 2024 05:17)  T(F): 100.1 (14 Apr 2024 05:17), Max: 100.1 (14 Apr 2024 05:17)  HR: 110 (14 Apr 2024 05:17) (92 - 110)  BP: 107/70 (14 Apr 2024 05:17) (102/66 - 117/71)  BP(mean): --  RR: 18 (14 Apr 2024 05:17) (16 - 18)  SpO2: 98% (14 Apr 2024 05:17) (95% - 99%)    Parameters below as of 14 Apr 2024 05:17  Patient On (Oxygen Delivery Method): room air        Physical Exam:  General: NAD, resting comfortably in bed  Pulmonary: Nonlabored breathing, no respiratory distress  Cardiovascular: NSR  Abdominal: soft, NT/ND  Extremities: WWP, normal strength  Neuro: A/O x 3, CNs II-XII grossly intact, no focal deficits    I&O's Summary    13 Apr 2024 07:01  -  14 Apr 2024 07:00  --------------------------------------------------------  IN: 265 mL / OUT: 426 mL / NET: -161 mL        LABS:                        10.7   9.93  )-----------( 359      ( 14 Apr 2024 05:30 )             32.1     04-14    x   |  98  |  x   ----------------------------<  101<H>  3.7   |  25  |  0.85    Ca    8.9      14 Apr 2024 05:30  Phos  2.8     04-14  Mg     2.3     04-14    TPro  7.6  /  Alb  3.1<L>  /  TBili  0.3  /  DBili  x   /  AST  44<H>  /  ALT  53<H>  /  AlkPhos  130<H>  04-13      Urinalysis Basic - ( 14 Apr 2024 05:30 )    Color: x / Appearance: x / SG: x / pH: x  Gluc: 101 mg/dL / Ketone: x  / Bili: x / Urobili: x   Blood: x / Protein: x / Nitrite: x   Leuk Esterase: x / RBC: x / WBC x   Sq Epi: x / Non Sq Epi: x / Bacteria: x      CAPILLARY BLOOD GLUCOSE        LIVER FUNCTIONS - ( 13 Apr 2024 05:30 )  Alb: 3.1 g/dL / Pro: 7.6 g/dL / ALK PHOS: 130 U/L / ALT: 53 U/L / AST: 44 U/L / GGT: x             RADIOLOGY & ADDITIONAL STUDIES:

## 2024-04-14 NOTE — PROGRESS NOTE ADULT - PROVIDER SPECIALTY LIST ADULT
Surgery
Surgery
Gastroenterology
Gastroenterology
Surgery
Surgery
Gastroenterology
Infectious Disease
Surgery
Infectious Disease

## 2024-04-15 ENCOUNTER — APPOINTMENT (OUTPATIENT)
Dept: GASTROENTEROLOGY | Facility: CLINIC | Age: 34
End: 2024-04-15
Payer: COMMERCIAL

## 2024-04-15 ENCOUNTER — TRANSCRIPTION ENCOUNTER (OUTPATIENT)
Age: 34
End: 2024-04-15

## 2024-04-15 LAB
CULTURE RESULTS: SIGNIFICANT CHANGE UP
CULTURE RESULTS: SIGNIFICANT CHANGE UP
SPECIMEN SOURCE: SIGNIFICANT CHANGE UP
SPECIMEN SOURCE: SIGNIFICANT CHANGE UP

## 2024-04-15 PROCEDURE — G2211 COMPLEX E/M VISIT ADD ON: CPT | Mod: NC,1L

## 2024-04-15 PROCEDURE — 99442: CPT

## 2024-04-15 RX ORDER — POLYETHYLENE GLYCOL 3350 AND ELECTROLYTES WITH LEMON FLAVOR 236; 22.74; 6.74; 5.86; 2.97 G/4L; G/4L; G/4L; G/4L; G/4L
236 POWDER, FOR SOLUTION ORAL
Qty: 1 | Refills: 0 | Status: DISCONTINUED | COMMUNITY
Start: 2024-04-04 | End: 2024-04-15

## 2024-04-15 RX ORDER — POLYETHYLENE GLYCOL 3350 AND ELECTROLYTES WITH LEMON FLAVOR 236; 22.74; 6.74; 5.86; 2.97 G/4L; G/4L; G/4L; G/4L; G/4L
236 POWDER, FOR SOLUTION ORAL
Qty: 1 | Refills: 0 | Status: DISCONTINUED | COMMUNITY
Start: 2023-10-10 | End: 2024-04-15

## 2024-04-15 RX ORDER — PREDNISONE 10 MG/1
10 TABLET ORAL DAILY
Qty: 30 | Refills: 2 | Status: DISCONTINUED | COMMUNITY
Start: 2024-04-04 | End: 2024-04-15

## 2024-04-15 RX ORDER — CALCIUM CARBONATE/VITAMIN D3 600 MG-10
600-10 TABLET ORAL DAILY
Qty: 60 | Refills: 6 | Status: DISCONTINUED | COMMUNITY
Start: 2024-04-04 | End: 2024-04-15

## 2024-04-15 RX ORDER — POLYETHYLENE GLYCOL 3350 AND ELECTROLYTES WITH LEMON FLAVOR 236; 22.74; 6.74; 5.86; 2.97 G/4L; G/4L; G/4L; G/4L; G/4L
236 POWDER, FOR SOLUTION ORAL
Qty: 1 | Refills: 0 | Status: DISCONTINUED | COMMUNITY
Start: 2023-07-24 | End: 2024-04-15

## 2024-04-17 LAB
CULTURE RESULTS: SIGNIFICANT CHANGE UP
SPECIMEN SOURCE: SIGNIFICANT CHANGE UP

## 2024-04-18 NOTE — HISTORY OF PRESENT ILLNESS
[Home] : at home, [unfilled] , at the time of the visit. [Medical Office: ([facility])___] : at the medical office located in  [Verbal consent obtained from patient] : the patient, [unfilled] [FreeTextEntry1] : This is a 34 year old female with stricturing and fistulizing Crohn's disease previously on IFX q 4 weeks, last dose skipped; who presents today for post-hospital f/u. Previous visit for screening visit for Phase III, DUET-CD.  Pt reports since hospital discharge, she is feeling more weak, but starting to gain strength. She has a CSCOPE planned for tomorrow but is going to hold off by 2 weeks in order to recover.  Her hospital course included CT scan as well as anal dilation by Dr. Talamantes in the OR. She developed a fever post-op that has resolved. Cultures negative.   Pt reports much less abd pain with regular laxative use.   CT inpatient: BOWEL: No bowel obstruction. Wall thickening with mucosal hyperenhancement involving 3.7 cm length of the terminal ileum. Mild wall thickening with mucosal enhancement of the rectum. Appendix is normal. Previous rectovaginal and right perianal fistulas are not well visualized on the current exam. PERITONEUM: No ascites. VESSELS: Within normal limits. RETROPERITONEUM/LYMPH NODES: No lymphadenopathy. ABDOMINAL WALL: Within normal limits. BONES: Within normal limits.  IMPRESSION: No significant change since 4/8/2024.  Unchanged mild inflammatory changes of the terminal ileum and rectum. No bowel obstruction.

## 2024-04-18 NOTE — ASSESSMENT
[FreeTextEntry1] : This is a 34 year old female with stricturing and fistulizing Crohn's disease previously on IFX q 4 weeks, last dose skipped; who presents today for post-hospital f/u. She had dilation of anal stricture and able to pass remaining stool. She did have post proc fever and hence her DUET study colonoscopy will be delayed addl 2 weeks per her surgeon's preference.   Plan to continue laxatives Plan for CSCOPE for DUET-CD trial in 2 weeks Off of antibiotics, pt remaining fever-free  F/U as per study protocol

## 2024-04-20 DIAGNOSIS — K50.112 CROHN'S DISEASE OF LARGE INTESTINE WITH INTESTINAL OBSTRUCTION: ICD-10-CM

## 2024-04-20 DIAGNOSIS — E44.0 MODERATE PROTEIN-CALORIE MALNUTRITION: ICD-10-CM

## 2024-04-20 DIAGNOSIS — K50.913 CROHN'S DISEASE, UNSPECIFIED, WITH FISTULA: ICD-10-CM

## 2024-04-20 DIAGNOSIS — Z91.018 ALLERGY TO OTHER FOODS: ICD-10-CM

## 2024-04-20 DIAGNOSIS — K62.4 STENOSIS OF ANUS AND RECTUM: ICD-10-CM

## 2024-04-20 DIAGNOSIS — K62.6 ULCER OF ANUS AND RECTUM: ICD-10-CM

## 2024-04-20 DIAGNOSIS — R50.9 FEVER, UNSPECIFIED: ICD-10-CM

## 2024-04-20 DIAGNOSIS — Z91.02 FOOD ADDITIVES ALLERGY STATUS: ICD-10-CM

## 2024-04-20 DIAGNOSIS — K56.41 FECAL IMPACTION: ICD-10-CM

## 2024-04-20 DIAGNOSIS — Z91.013 ALLERGY TO SEAFOOD: ICD-10-CM

## 2024-04-20 DIAGNOSIS — K62.89 OTHER SPECIFIED DISEASES OF ANUS AND RECTUM: ICD-10-CM

## 2024-04-20 DIAGNOSIS — N82.3 FISTULA OF VAGINA TO LARGE INTESTINE: ICD-10-CM

## 2024-04-25 ENCOUNTER — APPOINTMENT (OUTPATIENT)
Dept: GASTROENTEROLOGY | Facility: CLINIC | Age: 34
End: 2024-04-25

## 2024-04-29 DIAGNOSIS — Z00.00 ENCOUNTER FOR GENERAL ADULT MEDICAL EXAMINATION W/OUT ABNORMAL FINDINGS: ICD-10-CM

## 2024-04-30 ENCOUNTER — OUTPATIENT (OUTPATIENT)
Dept: OUTPATIENT SERVICES | Facility: HOSPITAL | Age: 34
LOS: 1 days | Discharge: HOME | End: 2024-04-30
Payer: COMMERCIAL

## 2024-04-30 ENCOUNTER — TRANSCRIPTION ENCOUNTER (OUTPATIENT)
Age: 34
End: 2024-04-30

## 2024-04-30 ENCOUNTER — OUTPATIENT (OUTPATIENT)
Dept: OUTPATIENT SERVICES | Facility: HOSPITAL | Age: 34
LOS: 1 days | End: 2024-04-30
Payer: COMMERCIAL

## 2024-04-30 ENCOUNTER — APPOINTMENT (OUTPATIENT)
Dept: GASTROENTEROLOGY | Facility: HOSPITAL | Age: 34
End: 2024-04-30

## 2024-04-30 VITALS — HEIGHT: 67 IN | WEIGHT: 164.91 LBS

## 2024-04-30 DIAGNOSIS — Z00.6 ENCOUNTER FOR EXAMINATION FOR NORMAL COMPARISON AND CONTROL IN CLINICAL RESEARCH PROGRAM: ICD-10-CM

## 2024-04-30 PROCEDURE — 71046 X-RAY EXAM CHEST 2 VIEWS: CPT | Mod: 26

## 2024-04-30 PROCEDURE — 45380 COLONOSCOPY AND BIOPSY: CPT

## 2024-04-30 PROCEDURE — 71046 X-RAY EXAM CHEST 2 VIEWS: CPT

## 2024-04-30 PROCEDURE — 93005 ELECTROCARDIOGRAM TRACING: CPT

## 2024-04-30 PROCEDURE — 93010 ELECTROCARDIOGRAM REPORT: CPT

## 2024-05-07 ENCOUNTER — APPOINTMENT (OUTPATIENT)
Dept: GASTROENTEROLOGY | Facility: CLINIC | Age: 34
End: 2024-05-07
Payer: COMMERCIAL

## 2024-05-07 VITALS — WEIGHT: 159 LBS | BODY MASS INDEX: 24.96 KG/M2 | HEIGHT: 67 IN

## 2024-05-07 DIAGNOSIS — Z71.3 DIETARY COUNSELING AND SURVEILLANCE: ICD-10-CM

## 2024-05-07 PROCEDURE — 97803 MED NUTRITION INDIV SUBSEQ: CPT | Mod: 95

## 2024-05-07 NOTE — HISTORY OF PRESENT ILLNESS
[FreeTextEntry1] : 35 y/o F presents for follow up evaluation of Crohn's disease   Primary GI Dr. Tonia Prieto, Crohn's managed by Dr. Wu  Colonoscopy completed 20 - JOSE remarkable for perianal skin tags - firm circumferential nodularity in the anus causing stricture. Could NOT be traversed with pediatric colonoscopy, finally traversed with adult gastroscope. Reached to the ascending colon but likely visualizing ICV - large ulcer seen at farthest extent reached  Seen in the office on 3/23/20 after initial diagnosis. Patient was advised to return to GI and begin medical therapy  MRI Enterography completed 21 - compared to 3/3/20, the inflammatory change of the distal ileum shows improvement. Previously appreciated long ileal segment mild inflammatory change is no longer appreciated. However, there is newly appreciated descending colon inflammatory change - no evidence of bowel obstruction, proximal small bowel dilatation, fistula or abscess - IUD appear to be upside down in position within the uterine cavity  Office visit 21 on exam noted A quiescent, closed right anterior anal fistula opening at right anterior anal fistula opening in the 1 o'clock position. Sphincter tone was tight with moderate diffuse tenderness. Anoscopy deferred due to pain. Advised to begin trial of Mesalamine suppositories and/or possible EUA w/ dilation.  Seen in office 2021, on Remicaide Q4 wks, c/o fecal urgency and fecal incontinence about every 2 weeks. Exam: Perianal Skin: excoriations, a fistula (quiescent- closed right anterior anal fistula opening) in the 5:00 position and Superficial skin erosions/excoriations extending along the posterior anal margin of the inter erica cleft and perianal skin. Edematous right posterior base external hemorrhoidal tag with small anterior tags. The sphincter tone was tight. able to pass fifth finger on rectal examination. Advised complex perianal disease with anal fistula and stricturing of distal rectum. No surgical intervention advised, recommended MRI and further evaluation with colonoscopy for surveillance of IBD  MRI 2021 - Stable 3 cm segment terminal ileum with moderate active inflammatory disease including prominent mural hyperenhancement and restricted diffusion. The segment again demonstrated probable stricture (intraluminal narrowing without upstream dilation). - Long segment distal colon with ch previously demonstrated active inflammatory disease now appears significantly improved with a 4 cm residual segment demonstrating persistent mild active inflammatory disease - NO evidence of penetrating or perianal disease - NO extraintestinal sequela of inflammatory bowel disease  Case discussed with multidisciplinary IBD conference- Continue with medical management for now. Can try dilation of anal stenosis, but surgical evaluation suggested easy passage of digit and no evidence of anal stenosis as a cause of outlet obstruction (also agreed based on imaging). Bowel regimen to help with constipation. Group agreed that endoscopic dilation of IVC would be beneficial.  Colonoscopy (22) w/ Dr. Wu: Digital dilation was performed to be able pass PCF beyond anorectal stricture. The rest of the colon had diffuse apthae/exudate affecting >50% of surface area. The ICV was deformed and ulcerated and had only a pinhole opening. Under fluoroscopy guidance, a guidewire was passed and the stricture was dilated incrementally form 8 - 12mm. The stricture was still did not allow passage of colonoscope. (Sigmoid colon pathology --> colonic mucosa showing mild to moderate, focally active chronic inflammation. No dysplasia noted)  Last MRE 2023: IMPRESSION: 3.5 cm short segment involvement of the terminal ileum at the ileocecal valve with luminal narrowing, most consistent with chronic fibro stenotic disease, not significantly changed since prior studies.  Last Colonoscopy 10/31/23: Findings: Additional findings On JOSE there was a 6cm distal rectal stricture from 3-9cm from anal verge that was manually dilated prior to procedure and allowed passage of the PCF colonoscope. The stricture was later balloon dilated from 12 to 13.5 cm upon completion of the colonoscopy. There was ulceration and a pinhole stricture at the entrance of the terminal ileum. A guidewire was used to confirm placement. The stricture was dilated from 10 to 15mm; however, the PCF colonoscope was unable to pass the stricture following dilation. The rest of the colon was otherwise unremarkable..  Impressions: On JOSE there was a 6cm distal rectal stricture from 3-9cm from anal verge that was manually dilated prior to procedure and then balloon dilated from 12 to 13cm upon completion of the colonoscopy. There was ulceration and a pinhole stricture at the entrance of the terminal ileum. The stricture was dilated from 10 to 15; however, the PCF colonoscope was unable to pass the stricture following dilation. The rest of the colon was otherwise unremarkable.  Follow-up office visit. We did discuss that she should be evaluated by colorectal to address the stricture with a resection. Continue Inflectra [id number] and rectal dilations.  Followed w/ GI NP Vianey 23, -clinical status, MRE without colonic involvement and stable TI segment, and healed visualized mucosa on last scope suggestive of good disease control on current regimen - unclear if recent constipation is due to tightening again of anal stricture vs true constipation. She is too anxious to inc the size of her dilator. Advised to start Miralax daily as no relief with recent CSCOPE. - She has intermediate to normal level of TPMT but no plans to start immunomodulators given her drug levels are therapeutic without evidence of antibodies to Inflectra in Sept - can check again later this year  Last seen 23, On exam; skin erosions / excoriations extending along the posterior anal margin of the inter  cleft and perianal skin. Edematous right posterior base external hemorrhoidal tag with small anterior tags, excoriations and fistula (RAQ) area of induration and fistula opening with expressible drainage mildly tender in 5:00 position. Sphincter tone was tight, able to pass fifth finger on rectal exam. MRI advised for further assessment and extent of perianal Crohn's disease. Role of possible EUA with dilation and possible drainage and seton placement reviewed.  MRI pelvis performed at [facility] on 23 ANAL FISTULA: Present: Yes LOCATION: MUCOSAL OPENING (based on axial images): Right anterior quadrant (6:26) EXTERNAL OPENING: Left inner gluteal fold CLASSIFICATION: Intersphincteric ACTIVITY: Healing granulation tissue (linear enhancement without fluid) COURSE OF FISTULOUS TRACT: Nonbranching (15:38-47) DISTANCE FROM MUCOSAL DEFECT TO THE PERIANAL SKIN (measured on coronal imaging): 2.5 cm (9:14) ADDITIONAL COMMENTS: None SECONDARY FISTULA OR ABSCESS (description if present): Second fistula with mucosal opening arising from anterior midline superior anal canal communicating with posterior vaginal wall (6:22). 3 x 3 mm T2 hyperintense focus with peripheral enhancement (18:32, 6:21) suggesting tiny abscess, suspected communication with anal mucosa at separate level right anterior quadrant at level of anal rectal junction/dentate line (6:20; 9:14). REPRODUCTIVE ORGANS: Left ovary within normal limits. Right ovary not visualized. Intrauterine device in place. BLADDER: Within normal limits. LYMPH NODES: No pelvic lymphadenopathy. VISUALIZED PORTIONS: PERITONEUM: No ascites. VESSELS: Within normal limits. ABDOMINAL WALL: Within normal limits. BONES: Within normal limits.  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 Both demonstrate healing changes. Tiny abscess at left anorectal junction.  Cheko RIGGINS contact patient 24 to review findings, evidence of rectovaginal fistula, patient currently asymptomatic. Recommended pt to f/u with Gi for medical management. If symptomatic despite medication advised to return to office.  Most recently seen 24, Patient continues Inflectra [id number], last done 1/15/24. Not doing rectal dilations. Noticed rectal bleeding with straining, states she will have urgency, try to go, pas gas and then notice blood when wiping.   On exam, superficial skin excoriations extending along the posterior anal margin of the inter  cleft and perianal ski. Edematous right posterior base external hemorrhoidal tag with small anterior tags, excoriations and fistula (right anterior quadrant area of induration and fistula opening with expressible drainage. Mildly tender) in the 5:00 position. External hemorrhoid. Edematous mildly indurated tag along the right posterior anal verge. The sphincter tone was tight, able to pass fifth finger on rectal exam.   Recommendations for patient to continue medical management, however, should symptoms change and if she develops pain or increasing difficulty with bowel function. Patient may consider fecal diversion.   Interim, patient obstructive symptoms secondary to stricturing fistulizing Crohn's disease, with associated intersphincteric fistula, and rectovaginal fistula previously on Mesalamine and Infliximab, off for 8 weeks pending a clinical trial. Patient presented with 1 week of worsening lower abdominal pain, constipation and decreased PO intake. Incomplete emptying when having BMs, Pt seen by Gi Dr. Wu Thursday (--) for increasing abdominal pain and prescribed course of steroid 10 mg, PO. Pt had AXR c/w large stool burden. Pt recommended to present to ED on Friday for rectal dilation.   Patient wished for medical management, despite trial of conservative measures pt clinically worsened with CT and MRI imaging revealing approximately 3-4 severe segment of wall thickening probable mild active inflammation in distal/ terminal ileum extending to the ileocecal valve. There is mild wall thickening of the rectosigmoid colon, which could be due to underdistention or colitis. Possible subtle residual collapsed rectovaginal fistula, better evaluated on the comparison MRI and small persistent right-sided perianal fistula extending inferiorly to the right medial gluteal skin. Mal positioned intrauterine device rotated greater than 90 degrees with stem oriented toward the right cornua  S/p EUA with rectal dilation and flexible sigmoidoscopy 24

## 2024-05-10 ENCOUNTER — APPOINTMENT (OUTPATIENT)
Dept: COLORECTAL SURGERY | Facility: CLINIC | Age: 34
End: 2024-05-10

## 2024-05-10 DIAGNOSIS — A49.8 OTHER BACTERIAL INFECTIONS OF UNSPECIFIED SITE: ICD-10-CM

## 2024-05-10 RX ORDER — VANCOMYCIN HYDROCHLORIDE 250 MG/1
250 CAPSULE ORAL
Qty: 40 | Refills: 0 | Status: ACTIVE | COMMUNITY
Start: 2024-05-10 | End: 1900-01-01

## 2024-05-16 ENCOUNTER — APPOINTMENT (OUTPATIENT)
Dept: GASTROENTEROLOGY | Facility: CLINIC | Age: 34
End: 2024-05-16

## 2024-05-28 ENCOUNTER — TRANSCRIPTION ENCOUNTER (OUTPATIENT)
Age: 34
End: 2024-05-28

## 2024-05-30 ENCOUNTER — NON-APPOINTMENT (OUTPATIENT)
Age: 34
End: 2024-05-30

## 2024-05-30 VITALS
TEMPERATURE: 96.8 F | RESPIRATION RATE: 14 BRPM | WEIGHT: 163 LBS | OXYGEN SATURATION: 98 % | BODY MASS INDEX: 25.58 KG/M2 | DIASTOLIC BLOOD PRESSURE: 80 MMHG | HEIGHT: 67 IN | SYSTOLIC BLOOD PRESSURE: 140 MMHG | HEART RATE: 75 BPM

## 2024-05-31 NOTE — HISTORY OF PRESENT ILLNESS
[FreeTextEntry1] : Date: 5/30/2024 Subject Initials: RACHELL Subject Number: [id number] Protocol: [id number]  This is a 34 year old female with stricturing and fistulizing Crohn's disease previously on IFX q 4 weeks who presents today with multiple complex perianal fistulae with abscesses. Here for screening visit for Phase III, DUET-CD A Phase 2b Randomized, Double-blind, Active-and Placebo-controlled, Parallel-group, Multicenter Study to Evaluate the Efficacy and Safety of Induction and Maintenance Combination Therapy with Guselkumab and Golimumab in Participants with Moderately to Severely Active Crohn's Disease Here today for baseline visit.  Subject #[id number] met all the inclusion criteria and did not meet any exclusion criteria for DUET-CD and was enrolled on 5/30/2024. The study was explained; the subject had the opportunity to ask questions, and was given a signed copy of the consent form. Consent was obtained prior to the start of any study procedures.  The procedures listed in the consent with regard to contraception were reviewed with the subject. The subject agreed to continue with condoms for the entire study. The subject was informed to notify the study doctor immediately if he suspects a partner has become pregnant.  This is a 34 year old female with stricturing and fistulizing Crohn's disease previously on IFX q 4 weeks who presents today for enrollment into the DUET-CD trial. Previously visited for screening for Phase III, DUET-CD. Patient continues to have improvement in weakness and lethargy since hospital discharge. She has noticed increasing right knee pain when walking up stairs over the previous one week and denies traumatic injury. She continues to notice occasional drainage from perianal fistula, but denies tenderness to palpation, obvious abscess, or fevers at home. Denies abdominal pain today.   Pt reports since hospital discharge, she is feeling more weak, but starting to gain strength. She has a CSCOPE planned for tomorrow but is going to hold off by 2 weeks in order to recover.  Her hospital course included CT scan as well as anal dilation by Dr. Talamantes in the OR. She developed a fever post-op that has resolved. Cultures negative.  Pt reports much less abd pain with regular laxative use.  CT inpatient: BOWEL: No bowel obstruction. Wall thickening with mucosal hyperenhancement involving 3.7 cm length of the terminal ileum. Mild wall thickening with mucosal enhancement of the rectum. Appendix is normal. Previous rectovaginal and right perianal fistulas are not well visualized on the current exam. PERITONEUM: No ascites. VESSELS: Within normal limits. RETROPERITONEUM/LYMPH NODES: No lymphadenopathy. ABDOMINAL WALL: Within normal limits. BONES: Within normal limits.  IMPRESSION: No significant change since 4/8/2024.  Unchanged mild inflammatory changes of the terminal ileum and rectum. No bowel obstruction.

## 2024-05-31 NOTE — PHYSICAL EXAM
[Normal] : normal bowel sounds, non-tender, no masses, soft, no no hepato-splenomegaly [de-identified] : Right knee not tender to active or passive range of motion, non tender to palpation [de-identified] : one non draining, non tender to palpation perianal fistula

## 2024-05-31 NOTE — ASSESSMENT
[FreeTextEntry1] : Date: 5/30/2024 Subject Initials: RACHELL Subject Number: [id number] Protocol: [id number]  This is a 34 year old female with stricturing and fistulizing Crohn's disease previously on IFX q 4 weeks who presents today for screening visit for Phase III, DUET-CD A Phase 2b Randomized, Double-blind, Active-and Placebo-controlled, Parallel-group, Multicenter Study to Evaluate the Efficacy and Safety of Induction and Maintenance Combination Therapy with Guselkumab and Golimumab in Participants with Moderately to Severely Active Crohn's Disease.  #Stricturing and perianal fistulizing Crohn's disease - Perianal fistula is currently not draining and no obvious signs of abscess - reviewed colonoscopy in detail - labs today as per protocol - stool kit collected - physical exam performed by Jim Gonzalez DO - E-diary reviewed - Study agent injected to b/l lower abdomen without any ADRs, pt tolerated well  F/U as per study protocol  DO Krunal Young Advanced Inflammatory Bowel Disease Fellow

## 2024-06-11 ENCOUNTER — NON-APPOINTMENT (OUTPATIENT)
Age: 34
End: 2024-06-11

## 2024-06-11 NOTE — REASON FOR VISIT
[Home] : at home, [unfilled] , at the time of the visit. [Medical Office: ([facility])___] : at the medical office located in  [Patient] : the patient [Follow-up] : a follow-up of an existing diagnosis

## 2024-06-13 NOTE — HISTORY OF PRESENT ILLNESS
[FreeTextEntry1] : Date: 6/11/2024 Subject Initials: RACHELL Subject Number: [id number] Protocol: [id number]  A Phase 2b Randomized, Double-blind, Active-and Placebo-controlled, Parallel-group, Multicenter Study to Evaluate the Efficacy and Safety of Induction and Maintenance Combination Therapy with Guselkumab and Golimumab in Participants with Moderately to Severely Active Crohn's Disease  Subject #[id number] met all the inclusion criteria and did not meet any exclusion criteria for DUET-CD and was enrolled on 5/30/2024. The study was explained; the subject had the opportunity to ask questions, and was given a signed copy of the consent form. Consent was obtained prior to the start of any study procedures.  The procedures listed in the consent with regard to contraception were reviewed with the subject. The subject agreed to continue with condoms for the entire study. The subject was informed to notify the study doctor immediately if he suspects a partner has become pregnant.  This is a 34 year old female with stricturing and fistulizing Crohn's disease previously on IFX q 4 weeks who presents today with multiple complex perianal fistulae with abscesses for a 2 week telephone follow up after trial enrollment.   6/11/2024 Patient describes feeling very well since trial enrollment. States that she has had more consistent formed bowel movements than previous. Also describes improved abdominal pain, however still present. Perianal fistula still not draining. Currently with 2-4 formed not bloody not painful bowel movements per day. Denies hematochezia or melena. Denies nausea and vomiting.   5/30/2024 This is a 34 year old female with stricturing and fistulizing Crohn's disease previously on IFX q 4 weeks who presents today for enrollment into the DUET-CD trial. Previously visited for screening for Phase III, DUET-CD. Patient continues to have improvement in weakness and lethargy since hospital discharge. She has noticed increasing right knee pain when walking up stairs over the previous one week and denies traumatic injury. She continues to notice occasional drainage from perianal fistula, but denies tenderness to palpation, obvious abscess, or fevers at home. Denies abdominal pain today.  Pt reports since hospital discharge, she is feeling more weak, but starting to gain strength. She has a CSCOPE planned for tomorrow but is going to hold off by 2 weeks in order to recover.  Her hospital course included CT scan as well as anal dilation by Dr. Talamantes in the OR. She developed a fever post-op that has resolved. Cultures negative.  Pt reports much less abd pain with regular laxative use.  CT inpatient: BOWEL: No bowel obstruction. Wall thickening with mucosal hyperenhancement involving 3.7 cm length of the terminal ileum. Mild wall thickening with mucosal enhancement of the rectum. Appendix is normal. Previous rectovaginal and right perianal fistulas are not well visualized on the current exam. PERITONEUM: No ascites. VESSELS: Within normal limits. RETROPERITONEUM/LYMPH NODES: No lymphadenopathy. ABDOMINAL WALL: Within normal limits. BONES: Within normal limits.  IMPRESSION: No significant change since 4/8/2024.  Unchanged mild inflammatory changes of the terminal ileum and rectum. No bowel obstruction.

## 2024-06-13 NOTE — ASSESSMENT
[FreeTextEntry1] : Date: 6/11/2024 Subject Initials: RAHCELL Subject Number: [id number] Protocol: [id number]  This is a 34 year old female with stricturing and fistulizing Crohn's disease previously on IFX q 4 weeks who presents today for a 2 week follow up visit after enrolling into Phase III, DUET-CD.   A Phase 2b Randomized, Double-blind, Active-and Placebo-controlled, Parallel-group, Multicenter Study to Evaluate the Efficacy and Safety of Induction and Maintenance Combination Therapy with Guselkumab and Golimumab in Participants with Moderately to Severely Active Crohn's Disease.  #Stricturing and fistulizing Crohn's disease, clinical response - Patient has symptomatic improvement after enrollment into trial. Perianal fistula not draining and having improvement in abdominal pain as well as bowl movement consistency - Personally reviewed patient's E-diary and discussed with patient - No adverse events since last study visit - Will plan to continue study protocol as patient as had clinical improvement  Follow up per study protocol  DO Krunal Young Advanced Inflammatory Bowel Disease Fellow

## 2024-06-18 ENCOUNTER — APPOINTMENT (OUTPATIENT)
Dept: GASTROENTEROLOGY | Facility: CLINIC | Age: 34
End: 2024-06-18
Payer: COMMERCIAL

## 2024-06-18 DIAGNOSIS — K50.90 CROHN'S DISEASE, UNSPECIFIED, W/OUT COMPLICATIONS: ICD-10-CM

## 2024-06-18 PROCEDURE — 97803 MED NUTRITION INDIV SUBSEQ: CPT | Mod: 95

## 2024-06-27 DIAGNOSIS — R74.01 ELEVATION OF LEVELS OF LIVER TRANSAMINASE LEVELS: ICD-10-CM

## 2024-06-28 ENCOUNTER — LABORATORY RESULT (OUTPATIENT)
Age: 34
End: 2024-06-28

## 2024-07-11 ENCOUNTER — NON-APPOINTMENT (OUTPATIENT)
Age: 34
End: 2024-07-11

## 2024-07-25 ENCOUNTER — APPOINTMENT (OUTPATIENT)
Dept: GASTROENTEROLOGY | Facility: CLINIC | Age: 34
End: 2024-07-25
Payer: COMMERCIAL

## 2024-07-25 VITALS
OXYGEN SATURATION: 98 % | HEIGHT: 67 IN | SYSTOLIC BLOOD PRESSURE: 128 MMHG | DIASTOLIC BLOOD PRESSURE: 70 MMHG | RESPIRATION RATE: 16 BRPM | HEART RATE: 99 BPM | TEMPERATURE: 96.3 F | WEIGHT: 162 LBS | BODY MASS INDEX: 25.43 KG/M2

## 2024-07-25 PROCEDURE — 99499A: CUSTOM | Mod: NC

## 2024-07-26 LAB
IGG SER QL IEP: 1930 MG/DL
IGG4 SER-MCNC: 6.2 MG/DL

## 2024-07-29 LAB
ALP BLD-CCNC: 95 IU/L
ALP BONE CFR SERPL: 30 %
ALP INTEST CFR SERPL: 3 %
ALP LIVER CFR SERPL: 67 %
ANA PAT FLD IF-IMP: ABNORMAL
ANA SER IF-ACNC: ABNORMAL
MITOCHONDRIA AB SER IF-ACNC: NORMAL
SMOOTH MUSCLE AB SER QL IF: NORMAL

## 2024-07-30 NOTE — HISTORY OF PRESENT ILLNESS
[FreeTextEntry1] : Date: 7/25/2024 Subject Initials: RACHELL Subject Number: [id number] Protocol: [id number]  A Phase 2b Randomized, Double-blind, Active-and Placebo-controlled, Parallel-group, Multicenter Study to Evaluate the Efficacy and Safety of Induction and Maintenance Combination Therapy with Guselkumab and Golimumab in Participants with Moderately to Severely Active Crohn's Disease  Subject #[id number] met all the inclusion criteria and did not meet any exclusion criteria for DUET-CD and was enrolled on 5/30/2024. The study was explained; the subject had the opportunity to ask questions, and was given a signed copy of the consent form. Consent was obtained prior to the start of any study procedures.  The procedures listed in the consent with regard to contraception were reviewed with the subject. The subject agreed to continue with condoms for the entire study. The subject was informed to notify the study doctor immediately if he suspects a partner has become pregnant.  This is a 34 year old female with stricturing and fistulizing Crohn's disease previously on IFX q 4 weeks who presents today with multiple complex perianal fistulae with abscesses for a 8 week follow up after trial enrollment.  7/25/24 Patient has been feeling really well. She does not have any abdominal pain, nausea, constipation or vomiting. She denies any hematochezia. She denies any drainage from her fistula. She has noticed some increase in knee pain but relates this to working out and going up the stairs more and aging and that she has had this knee pain for awhile. She denies any vision changes, redness of eyes, and new rashes. She has some urgency but is much better than previous as she feels that she has control and has been having solid bowel movements.   6/27/2024 Patient has been feeling well over the past two weeks. Has noticed significant improvement in abdominal pain and discomfort when attempting to have bowel movements. Describes resolution of hematochezia, but still soft consistency of stool. Denies nausea, vomiting, and constipation. Has worked closely with our dietician to increase fresh fruits and vegetables in diet. Tolerated previous drug administration well and has felt well between doses. Fistula still not draining. No new joint pain, rash, or other extraintestinal manifestations. Patient noted to have continued elevation in alk phos and liver enzymes. Patient has follow up appointment with OBGYN next week for IUD removal, as it is currently malposition.  6/11/2024 Patient describes feeling very well since trial enrollment. States that she has had more consistent formed bowel movements than previous. Also describes improved abdominal pain, however still present. Perianal fistula still not draining. Currently with 2-4 formed not bloody not painful bowel movements per day. Denies hematochezia or melena. Denies nausea and vomiting.  5/30/2024 This is a 34 year old female with stricturing and fistulizing Crohn's disease previously on IFX q 4 weeks who presents today for enrollment into the DUET-CD trial. Previously visited for screening for Phase III, DUET-CD. Patient continues to have improvement in weakness and lethargy since hospital discharge. She has noticed increasing right knee pain when walking up stairs over the previous one week and denies traumatic injury. She continues to notice occasional drainage from perianal fistula, but denies tenderness to palpation, obvious abscess, or fevers at home. Denies abdominal pain today.  Pt reports since hospital discharge, she is feeling more weak, but starting to gain strength. She has a CSCOPE planned for tomorrow but is going to hold off by 2 weeks in order to recover.  Her hospital course included CT scan as well as anal dilation by Dr. Talamantes in the OR. She developed a fever post-op that has resolved. Cultures negative.  Pt reports much less abd pain with regular laxative use.  CT inpatient: BOWEL: No bowel obstruction. Wall thickening with mucosal hyperenhancement involving 3.7 cm length of the terminal ileum. Mild wall thickening with mucosal enhancement of the rectum. Appendix is normal. Previous rectovaginal and right perianal fistulas are not well visualized on the current exam. PERITONEUM: No ascites. VESSELS: Within normal limits. RETROPERITONEUM/LYMPH NODES: No lymphadenopathy. ABDOMINAL WALL: Within normal limits. BONES: Within normal limits.  IMPRESSION: No significant change since 4/8/2024.  Unchanged mild inflammatory changes of the terminal ileum and rectum. No bowel obstruction.

## 2024-07-30 NOTE — ASSESSMENT
[FreeTextEntry1] : Date: 7/25/2024 Subject Initials: RACHELL Subject Number: [id number] Protocol: [id number]  This is a 34 year old female with stricturing and fistulizing Crohn's disease previously on IFX q 4 weeks who presents today with multiple complex perianal fistulae with abscesses for a 8 week follow up after trial enrollment. Patient has been feeling well since last visit and only GI-related symptom is urgency which is much improved Patient noted to have continued elevation in alk phos and liver enzymes.  #Stricturing and fistulizing Crohn's disease, clinical response - here for week 8 visit - Patient has symptomatic improvement after enrollment into trial. Perianal fistula not draining and no abdominal pain as well as bowl movement consistency - Personally reviewed patient's E-diary and discussed with patient - No adverse events since last study visit - Personally injected study drug into right lower quadrant and left lower quadrant of abdomen per study protocol. Patient tolerated the injections well and did not have adverse reaction 30 minutes after the injection administration  Elevated LFTs -patient with hx of elevated LFTs -in 6/2024 AST 15, ALT 9, T.bili 0.3, AlkPhos 89, GGT elevated at 51 -in 5/2024 AST 22, ALT 19, T.bili 0.5, Alkphos 124, GGT elevated at 83 -given hx of elevated AlkPhos and GGT, will obtain IgG, anti-mitochondrial-Ab, anti-smooth muscle antibodies, GONZALO, IgG4, and alkphos-isoenzymes, if concern will plan for MRCP  Follow up per research protocol  MD Krunal Tim IBD Fellow [facility]

## 2024-07-30 NOTE — HISTORY OF PRESENT ILLNESS
[FreeTextEntry1] : Date: 7/25/2024 Subject Initials: RACHELL Subject Number: [id number] Protocol: [id number]  A Phase 2b Randomized, Double-blind, Active-and Placebo-controlled, Parallel-group, Multicenter Study to Evaluate the Efficacy and Safety of Induction and Maintenance Combination Therapy with Guselkumab and Golimumab in Participants with Moderately to Severely Active Crohn's Disease  Subject #[id number] met all the inclusion criteria and did not meet any exclusion criteria for DUET-CD and was enrolled on 5/30/2024. The study was explained; the subject had the opportunity to ask questions, and was given a signed copy of the consent form. Consent was obtained prior to the start of any study procedures.  The procedures listed in the consent with regard to contraception were reviewed with the subject. The subject agreed to continue with condoms for the entire study. The subject was informed to notify the study doctor immediately if he suspects a partner has become pregnant.  This is a 34 year old female with stricturing and fistulizing Crohn's disease previously on IFX q 4 weeks who presents today with multiple complex perianal fistulae with abscesses for a 8 week follow up after trial enrollment.  7/25/24 Patient has been feeling really well. She does not have any abdominal pain, nausea, constipation or vomiting. She denies any hematochezia. She denies any drainage from her fistula. She has noticed some increase in knee pain but relates this to working out and going up the stairs more and aging and that she has had this knee pain for awhile. She denies any vision changes, redness of eyes, and new rashes. She has some urgency but is much better than previous as she feels that she has control and has been having solid bowel movements.   6/27/2024 Patient has been feeling well over the past two weeks. Has noticed significant improvement in abdominal pain and discomfort when attempting to have bowel movements. Describes resolution of hematochezia, but still soft consistency of stool. Denies nausea, vomiting, and constipation. Has worked closely with our dietician to increase fresh fruits and vegetables in diet. Tolerated previous drug administration well and has felt well between doses. Fistula still not draining. No new joint pain, rash, or other extraintestinal manifestations. Patient noted to have continued elevation in alk phos and liver enzymes. Patient has follow up appointment with OBGYN next week for IUD removal, as it is currently malposition.  6/11/2024 Patient describes feeling very well since trial enrollment. States that she has had more consistent formed bowel movements than previous. Also describes improved abdominal pain, however still present. Perianal fistula still not draining. Currently with 2-4 formed not bloody not painful bowel movements per day. Denies hematochezia or melena. Denies nausea and vomiting.  5/30/2024 This is a 34 year old female with stricturing and fistulizing Crohn's disease previously on IFX q 4 weeks who presents today for enrollment into the DUET-CD trial. Previously visited for screening for Phase III, DUET-CD. Patient continues to have improvement in weakness and lethargy since hospital discharge. She has noticed increasing right knee pain when walking up stairs over the previous one week and denies traumatic injury. She continues to notice occasional drainage from perianal fistula, but denies tenderness to palpation, obvious abscess, or fevers at home. Denies abdominal pain today.  Pt reports since hospital discharge, she is feeling more weak, but starting to gain strength. She has a CSCOPE planned for tomorrow but is going to hold off by 2 weeks in order to recover.  Her hospital course included CT scan as well as anal dilation by Dr. Talamantes in the OR. She developed a fever post-op that has resolved. Cultures negative.  Pt reports much less abd pain with regular laxative use.  CT inpatient: BOWEL: No bowel obstruction. Wall thickening with mucosal hyperenhancement involving 3.7 cm length of the terminal ileum. Mild wall thickening with mucosal enhancement of the rectum. Appendix is normal. Previous rectovaginal and right perianal fistulas are not well visualized on the current exam. PERITONEUM: No ascites. VESSELS: Within normal limits. RETROPERITONEUM/LYMPH NODES: No lymphadenopathy. ABDOMINAL WALL: Within normal limits. BONES: Within normal limits.  IMPRESSION: No significant change since 4/8/2024.  Unchanged mild inflammatory changes of the terminal ileum and rectum. No bowel obstruction.

## 2024-07-30 NOTE — PHYSICAL EXAM
[Normal Appearance] : the appearance of the neck was normal [No Neck Mass] : no neck mass was observed [No Respiratory Distress] : no respiratory distress [No Acc Muscle Use] : no accessory muscle use [Heart Rate And Rhythm] : heart rate was normal and rhythm regular [Normal S1, S2] : normal S1 and S2 [Murmurs] : no murmurs [Bowel Sounds] : normal bowel sounds [Abdomen Tenderness] : non-tender [No Masses] : no abdominal mass palpated [Abdomen Soft] : soft [Cervical Lymph Nodes Enlarged Anterior Bilaterally] : no anterior cervical lymphadenopathy [Normal Color / Pigmentation] : normal skin color and pigmentation [Normal] : oriented to person, place, and time [de-identified] : no joint swelling of knees

## 2024-07-30 NOTE — ASSESSMENT
[FreeTextEntry1] : Date: 7/25/2024 Subject Initials: RACHELL Subject Number: [id number] Protocol: [id number]  This is a 34 year old female with stricturing and fistulizing Crohn's disease previously on IFX q 4 weeks who presents today with multiple complex perianal fistulae with abscesses for a 8 week follow up after trial enrollment. Patient has been feeling well since last visit and only GI-related symptom is urgency which is much improved Patient noted to have continued elevation in alk phos and liver enzymes.  #Stricturing and fistulizing Crohn's disease, clinical response - here for week 8 visit - Patient has symptomatic improvement after enrollment into trial. Perianal fistula not draining and no abdominal pain as well as bowl movement consistency - Personally reviewed patient's E-diary and discussed with patient - No adverse events since last study visit - Personally injected study drug into right lower quadrant and left lower quadrant of abdomen per study protocol. Patient tolerated the injections well and did not have adverse reaction 30 minutes after the injection administration  Elevated LFTs -patient with hx of elevated LFTs -in 6/2024 AST 15, ALT 9, T.bili 0.3, AlkPhos 89, GGT elevated at 51 -in 5/2024 AST 22, ALT 19, T.bili 0.5, Alkphos 124, GGT elevated at 83 -given hx of elevated AlkPhos and GGT, will obtain IgG, anti-mitochondrial-Ab, anti-smooth muscle antibodies, GONZALO, IgG4, and alkphos-isoenzymes, if concern will plan for MRCP  Follow up per research protocol  MD Krunal Tim IBD Fellow [facility]

## 2024-07-30 NOTE — PHYSICAL EXAM
[Normal Appearance] : the appearance of the neck was normal [No Neck Mass] : no neck mass was observed [No Respiratory Distress] : no respiratory distress [No Acc Muscle Use] : no accessory muscle use [Heart Rate And Rhythm] : heart rate was normal and rhythm regular [Normal S1, S2] : normal S1 and S2 [Murmurs] : no murmurs [Bowel Sounds] : normal bowel sounds [Abdomen Tenderness] : non-tender [No Masses] : no abdominal mass palpated [Abdomen Soft] : soft [Cervical Lymph Nodes Enlarged Anterior Bilaterally] : no anterior cervical lymphadenopathy [Normal Color / Pigmentation] : normal skin color and pigmentation [Normal] : oriented to person, place, and time [de-identified] : no joint swelling of knees

## 2024-08-23 ENCOUNTER — APPOINTMENT (OUTPATIENT)
Dept: MRI IMAGING | Facility: CLINIC | Age: 34
End: 2024-08-23

## 2024-08-26 ENCOUNTER — APPOINTMENT (OUTPATIENT)
Dept: GASTROENTEROLOGY | Facility: CLINIC | Age: 34
End: 2024-08-26

## 2024-08-26 VITALS
DIASTOLIC BLOOD PRESSURE: 80 MMHG | SYSTOLIC BLOOD PRESSURE: 126 MMHG | HEART RATE: 88 BPM | OXYGEN SATURATION: 98 % | TEMPERATURE: 96.7 F | BODY MASS INDEX: 25.9 KG/M2 | WEIGHT: 165 LBS | HEIGHT: 67 IN | RESPIRATION RATE: 14 BRPM

## 2024-08-26 DIAGNOSIS — K60.2 ANAL FISSURE, UNSPECIFIED: ICD-10-CM

## 2024-08-26 PROCEDURE — 99499A: CUSTOM | Mod: NC

## 2024-08-26 RX ORDER — NITROGLYCERIN 4 MG/G
0.4 OINTMENT RECTAL
Qty: 1 | Refills: 0 | Status: ACTIVE | COMMUNITY
Start: 2024-08-26 | End: 1900-01-01

## 2024-08-26 NOTE — HISTORY OF PRESENT ILLNESS
[FreeTextEntry1] : past 2 weeks a bit different  last week - nausea, lightheadedness, groggy, not ok - this has happened before being on study when getting closer to medication administration prior to study administration.   this week - issues going to bathroom and some urgency but just passed gas, liquid stool and bright red blood, over last 4-days; blood in toilet; pain with bowel movement more burning sensation and sensitive to touch, does not remember ever having fissure. almost feels constipated last couple days.

## 2024-08-26 NOTE — ASSESSMENT
[FreeTextEntry1] : This is a 34 year old female with stricturing and fistulizing Crohn's disease previously on IFX q 4 weeks who presents today with multiple complex perianal fistulae with abscesses for a 8 week follow up after trial enrollment. Patient has been feeling well since last visit and only GI-related symptom is urgency which is much improved Patient noted to have continued elevation in alk phos and liver enzymes.  Stricturing and fistulizing Crohn's disease, clinical response - here for week 12 visit - Patient has symptomatic improvement after enrollment into trial. Perianal fistula not draining and no abdominal pain  - Personally reviewed patient's E-diary and discussed with patient - No adverse events since last study visit - Personally injected study drug into right lower quadrant of abdomen per study protocol. Patient tolerated the injections well and did not have adverse reaction 30 minutes after the injection administration  Hematochezia -patient with recent hematochezia over the past week, painful bowel movements with BRBP and constipation, on anal exam with multiple skin tags, upon manipulation of skin tags induced pain thus unable to complete entire JOSE; given symptoms and exam, most consistent with anal fissure -will start rectiv vs Topical diltiazem whichever is more affordable, for 4 weeks -increase water intake 2-3L daily -increase fiber with benefiber -will refer to colorectal surgery for EUA given difficulty of exam in office  Elevated LFTs -patient with hx of elevated LFTs -in 6/2024 AST 15, ALT 9, T.bili 0.3, AlkPhos 89, GGT elevated at 51 -in 5/2024 AST 22, ALT 19, T.bili 0.5, Alkphos 124, GGT elevated at 83 -given hx of elevated AlkPhos and GGT, obtained following IgG, anti-mitochondrial-Ab, anti-smooth muscle antibodies, GONZALO, IgG4, and alkphos-isoenzymes; results show elevated IgG 1930, IgG4 6.2, AlkPhos Iso WNL, anti-mitochondrial-Ab WNL, anti-smooth muscle antibodies WNL, GONZALO 1:80 homogenous; pending MRCP  Follow up per research protocol  MD Krunal Tim IBD Fellow [facility].

## 2024-08-26 NOTE — ASSESSMENT
[FreeTextEntry1] : This is a 34 year old female with stricturing and fistulizing Crohn's disease previously on IFX q 4 weeks who presents today with multiple complex perianal fistulae with abscesses for a 8 week follow up after trial enrollment. Patient has been feeling well since last visit and only GI-related symptom is urgency which is much improved Patient noted to have continued elevation in alk phos and liver enzymes.  Stricturing and fistulizing Crohn's disease, clinical response - here for week 12 visit - Patient has symptomatic improvement after enrollment into trial. Perianal fistula not draining and no abdominal pain  - Personally reviewed patient's E-diary and discussed with patient - No adverse events since last study visit - Personally injected study drug into right lower quadrant of abdomen per study protocol. Patient tolerated the injections well and did not have adverse reaction 30 minutes after the injection administration  Hematochezia -patient with recent hematochezia over the past week, painful bowel movements with BRBP and constipation, on anal exam with multiple skin tags, upon manipulation of skin tags induced pain thus unable to complete entire JOSE; given symptoms and exam, most consistent with anal fissure -will start rectiv vs Topical diltiazem whichever is more affordable, for 4 weeks -increase water intake 2-3L daily -increase fiber with benefiber -will refer to colorectal surgery for EUA given difficulty of exam in office  Elevated LFTs -patient with hx of elevated LFTs -in 6/2024 AST 15, ALT 9, T.bili 0.3, AlkPhos 89, GGT elevated at 51 -in 5/2024 AST 22, ALT 19, T.bili 0.5, Alkphos 124, GGT elevated at 83 -given hx of elevated AlkPhos and GGT, obtained following IgG, anti-mitochondrial-Ab, anti-smooth muscle antibodies, GONZALO, IgG4, and alkphos-isoenzymes; results show elevated IgG 1930, IgG4 6.2, AlkPhos Iso WNL, anti-mitochondrial-Ab WNL, anti-smooth muscle antibodies WNL, GONZALO 1:80 homogenous; pending MRCP  Follow up per research protocol  MD Krunal Tim IBD Fellow [facility].

## 2024-08-26 NOTE — PHYSICAL EXAM
[Normal] : oriented to person, place, and time [de-identified] : perianal fistula opening without drainage; anal exam with multiple skin tags, upon manipulation of skin tags induced pain [de-identified] : no submandiular LAD

## 2024-08-26 NOTE — PHYSICAL EXAM
[Normal] : oriented to person, place, and time [de-identified] : no submandiular LAD [de-identified] : perianal fistula opening without drainage; anal exam with multiple skin tags, upon manipulation of skin tags induced pain

## 2024-08-30 ENCOUNTER — OUTPATIENT (OUTPATIENT)
Dept: OUTPATIENT SERVICES | Facility: HOSPITAL | Age: 34
LOS: 1 days | End: 2024-08-30

## 2024-08-30 ENCOUNTER — APPOINTMENT (OUTPATIENT)
Dept: MRI IMAGING | Facility: CLINIC | Age: 34
End: 2024-08-30

## 2024-08-30 PROCEDURE — 74181 MRI ABDOMEN W/O CONTRAST: CPT | Mod: 26

## 2024-09-13 NOTE — HISTORY OF PRESENT ILLNESS
[FreeTextEntry1] : 35 y/o F presents for follow up evaluation of Crohn's disease  Primary GI Dr. Tonia Prieto, Crohn's managed by Dr. Wu  Colonoscopy completed 20 - JOSE remarkable for perianal skin tags - firm circumferential nodularity in the anus causing stricture. Could NOT be traversed with pediatric colonoscopy, finally traversed with adult gastroscope. Reached to the ascending colon but likely visualizing ICV - large ulcer seen at farthest extent reached  Seen in the office on 3/23/20 after initial diagnosis. Patient was advised to return to GI and begin medical therapy  MRI Enterography completed 21 - compared to 3/3/20, the inflammatory change of the distal ileum shows improvement. Previously appreciated long ileal segment mild inflammatory change is no longer appreciated. However, there is newly appreciated descending colon inflammatory change - no evidence of bowel obstruction, proximal small bowel dilatation, fistula or abscess - IUD appear to be upside down in position within the uterine cavity  Office visit 21 on exam noted A quiescent, closed right anterior anal fistula opening at right anterior anal fistula opening in the 1 o'clock position. Sphincter tone was tight with moderate diffuse tenderness. Anoscopy deferred due to pain. Advised to begin trial of Mesalamine suppositories and/or possible EUA w/ dilation.  Seen in office 2021, on Remicaide Q4 wks, c/o fecal urgency and fecal incontinence about every 2 weeks. Exam: Perianal Skin: excoriations, a fistula (quiescent- closed right anterior anal fistula opening) in the 5:00 position and Superficial skin erosions/excoriations extending along the posterior anal margin of the inter erica cleft and perianal skin. Edematous right posterior base external hemorrhoidal tag with small anterior tags. The sphincter tone was tight. able to pass fifth finger on rectal examination. Advised complex perianal disease with anal fistula and stricturing of distal rectum. No surgical intervention advised, recommended MRI and further evaluation with colonoscopy for surveillance of IBD  MRI 2021 - Stable 3 cm segment terminal ileum with moderate active inflammatory disease including prominent mural hyperenhancement and restricted diffusion. The segment again demonstrated probable stricture (intraluminal narrowing without upstream dilation). - Long segment distal colon with ch previously demonstrated active inflammatory disease now appears significantly improved with a 4 cm residual segment demonstrating persistent mild active inflammatory disease - NO evidence of penetrating or perianal disease - NO extraintestinal sequela of inflammatory bowel disease  Case discussed with multidisciplinary IBD conference- Continue with medical management for now. Can try dilation of anal stenosis, but surgical evaluation suggested easy passage of digit and no evidence of anal stenosis as a cause of outlet obstruction (also agreed based on imaging). Bowel regimen to help with constipation. Group agreed that endoscopic dilation of IVC would be beneficial.  Colonoscopy (22) w/ Dr. Wu: Digital dilation was performed to be able pass PCF beyond anorectal stricture. The rest of the colon had diffuse apthae/exudate affecting >50% of surface area. The ICV was deformed and ulcerated and had only a pinhole opening. Under fluoroscopy guidance, a guidewire was passed and the stricture was dilated incrementally form 8 - 12mm. The stricture was still did not allow passage of colonoscope. (Sigmoid colon pathology --> colonic mucosa showing mild to moderate, focally active chronic inflammation. No dysplasia noted)  Last MRE 2023: IMPRESSION: 3.5 cm short segment involvement of the terminal ileum at the ileocecal valve with luminal narrowing, most consistent with chronic fibro stenotic disease, not significantly changed since prior studies.  Last Colonoscopy 10/31/23: Findings: Additional findings On JOSE there was a 6cm distal rectal stricture from 3-9cm from anal verge that was manually dilated prior to procedure and allowed passage of the PCF colonoscope. The stricture was later balloon dilated from 12 to 13.5 cm upon completion of the colonoscopy. There was ulceration and a pinhole stricture at the entrance of the terminal ileum. A guidewire was used to confirm placement. The stricture was dilated from 10 to 15mm; however, the PCF colonoscope was unable to pass the stricture following dilation. The rest of the colon was otherwise unremarkable..  Impressions: On JOSE there was a 6cm distal rectal stricture from 3-9cm from anal verge that was manually dilated prior to procedure and then balloon dilated from 12 to 13cm upon completion of the colonoscopy. There was ulceration and a pinhole stricture at the entrance of the terminal ileum. The stricture was dilated from 10 to 15; however, the PCF colonoscope was unable to pass the stricture following dilation. The rest of the colon was otherwise unremarkable.  Follow-up office visit. We did discuss that she should be evaluated by colorectal to address the stricture with a resection. Continue Inflectra [id number] and rectal dilations.  Followed w/ GI NP Vianey 23, -clinical status, MRE without colonic involvement and stable TI segment, and healed visualized mucosa on last scope suggestive of good disease control on current regimen - unclear if recent constipation is due to tightening again of anal stricture vs true constipation. She is too anxious to inc the size of her dilator. Advised to start Miralax daily as no relief with recent CSCOPE. - She has intermediate to normal level of TPMT but no plans to start immunomodulators given her drug levels are therapeutic without evidence of antibodies to Inflectra in Sept - can check again later this year  Last seen 23, On exam; skin erosions / excoriations extending along the posterior anal margin of the inter erica cleft and perianal skin. Edematous right posterior base external hemorrhoidal tag with small anterior tags, excoriations and fistula (RAQ) area of induration and fistula opening with expressible drainage mildly tender in 5:00 position. Sphincter tone was tight, able to pass fifth finger on rectal exam. MRI advised for further assessment and extent of perianal Crohn's disease. Role of possible EUA with dilation and possible drainage and seton placement reviewed.  MRI pelvis performed at [facility] on 23 ANAL FISTULA: Present: Yes LOCATION: MUCOSAL OPENING (based on axial images): Right anterior quadrant (6:26) EXTERNAL OPENING: Left inner gluteal fold CLASSIFICATION: Intersphincteric ACTIVITY: Healing granulation tissue (linear enhancement without fluid) COURSE OF FISTULOUS TRACT: Nonbranching (15:38-47) DISTANCE FROM MUCOSAL DEFECT TO THE PERIANAL SKIN (measured on coronal imaging): 2.5 cm (9:14) ADDITIONAL COMMENTS: None SECONDARY FISTULA OR ABSCESS (description if present): Second fistula with mucosal opening arising from anterior midline superior anal canal communicating with posterior vaginal wall (6:22). 3 x 3 mm T2 hyperintense focus with peripheral enhancement (18:32, 6:21) suggesting tiny abscess, suspected communication with anal mucosa at separate level right anterior quadrant at level of anal rectal junction/dentate line (6:20; 9:14). REPRODUCTIVE ORGANS: Left ovary within normal limits. Right ovary not visualized. Intrauterine device in place. BLADDER: Within normal limits. LYMPH NODES: No pelvic lymphadenopathy. VISUALIZED PORTIONS: PERITONEUM: No ascites. VESSELS: Within normal limits. ABDOMINAL WALL: Within normal limits. BONES: Within normal limits.  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 Both demonstrate healing changes. Tiny abscess at left anorectal junction.  Cheko RIGGINS contact patient 24 to review findings, evidence of rectovaginal fistula, patient currently asymptomatic. Recommended pt to f/u with Gi for medical management. If symptomatic despite medication advised to return to office.  Most recently seen 24, Patient continues Inflectra x2iuaga, last done 1/15/24. Not doing rectal dilations. Noticed rectal bleeding with straining, states she will have urgency, try to go, pas gas and then notice blood when wiping.  On exam, superficial skin excoriations extending along the posterior anal margin of the inter  cleft and perianal ski. Edematous right posterior base external hemorrhoidal tag with small anterior tags, excoriations and fistula (right anterior quadrant area of induration and fistula opening with expressible drainage. Mildly tender) in the 5:00 position. External hemorrhoid. Edematous mildly indurated tag along the right posterior anal verge. The sphincter tone was tight, able to pass fifth finger on rectal exam.  Recommendations for patient to continue medical management, however, should symptoms change and if she develops pain or increasing difficulty with bowel function. Patient may consider fecal diversion.  Interim, patient obstructive symptoms secondary to stricturing fistulizing Crohn's disease, with associated intersphincteric fistula, and rectovaginal fistula previously on Mesalamine and Infliximab, off for 8 weeks pending a clinical trial. Patient presented with 1 week of worsening lower abdominal pain, constipation and decreased PO intake. Incomplete emptying when having BMs, Pt seen by Gi Dr. Wu Thursday (--) for increasing abdominal pain and prescribed course of steroid 10 mg, PO. Pt had AXR c/w large stool burden. Pt recommended to present to ED on Friday for rectal dilation.  Patient wished for medical management, despite trial of conservative measures pt clinically worsened with CT and MRI imaging revealing approximately 3-4 severe segment of wall thickening probable mild active inflammation in distal/ terminal ileum extending to the ileocecal valve. There is mild wall thickening of the rectosigmoid colon, which could be due to underdistention or colitis. Possible subtle residual collapsed rectovaginal fistula, better evaluated on the comparison MRI and small persistent right-sided perianal fistula extending inferiorly to the right medial gluteal skin. Mal positioned intrauterine device rotated greater than 90 degrees with stem oriented toward the right cornua  S/p EUA with rectal dilation and flexible sigmoidoscopy 4/9/24  5/10/24 presents for post op follow up.   Has been following up with Dr. Wu. Previously on Infliximab q 4 weeks. Has been enrolled to DUET-CD trial since 2024. Last seen by GI 24. As per chart review from GI note, patient has been experiencing diarrhea with rectal bleeding. Denies perianal fistula drainage.

## 2024-09-16 ENCOUNTER — APPOINTMENT (OUTPATIENT)
Dept: COLORECTAL SURGERY | Facility: CLINIC | Age: 34
End: 2024-09-16

## 2024-09-23 ENCOUNTER — APPOINTMENT (OUTPATIENT)
Dept: GASTROENTEROLOGY | Facility: CLINIC | Age: 34
End: 2024-09-23

## 2024-09-23 VITALS
SYSTOLIC BLOOD PRESSURE: 100 MMHG | HEIGHT: 67 IN | WEIGHT: 162 LBS | DIASTOLIC BLOOD PRESSURE: 62 MMHG | BODY MASS INDEX: 25.43 KG/M2

## 2024-09-23 VITALS — TEMPERATURE: 97.6 F | OXYGEN SATURATION: 99 % | HEART RATE: 78 BPM | RESPIRATION RATE: 17 BRPM

## 2024-09-23 NOTE — PHYSICAL EXAM
[Normal] : oriented to person, place, and time [de-identified] : small circular areas sporadic across abdomen, no raised

## 2024-09-23 NOTE — HISTORY OF PRESENT ILLNESS
[FreeTextEntry1] : Date: 8/26/2024 Subject Initials: RACHELL Subject Number: [id number] Protocol: [id number]  A Phase 2b Randomized, Double-blind, Active-and Placebo-controlled, Parallel-group, Multicenter Study to Evaluate the Efficacy and Safety of Induction and Maintenance Combination Therapy with Guselkumab and Golimumab in Participants with Moderately to Severely Active Crohn's Disease  Subject #[id number] met all the inclusion criteria and did not meet any exclusion criteria for DUET-CD and was enrolled on 5/30/2024. The study was explained; the subject had the opportunity to ask questions, and was given a signed copy of the consent form. Consent was obtained prior to the start of any study procedures.  The procedures listed in the consent with regard to contraception were reviewed with the subject. The subject agreed to continue with condoms for the entire study. The subject was informed to notify the study doctor immediately if he suspects a partner has become pregnant.  This is a 34 year old female with stricturing and fistulizing Crohn's disease previously on IFX q 4 weeks who presents today with multiple complex perianal fistulae with abscesses for a 16 week follow up after trial enrollment.  9/23-since last visit -underwent MRCP given elevated Alkphos in patient with IBD - Normal study. No biliary abnormalities  Today - last 48 hours has had gurglingly symptoms a little uncomfortable but could be gas. no new foods, no sick contacts. has new skin rash. she had really strange skin rash about 10 months ago while on IFX. The day after she got rash on her stomach and arms eventually saw derm. Derm did a biopsy perhaps showing dermatitis and was given cream (opzelura) and went away in a couple weeks. 2 weeks ago, noticed rash now she is having rash on stomach, arms and legs. rash is itching and has been using exoderm but has been helping some of the itch. no swelling, itching eyes/mouth. no new creams/detergent.  no blood, no pain. ntiro helped with rectal pain but gave her headaches. BMs have been better, formed, non urgency, non-bloody, 2 BMs per day.     8/26/24 The past 2 weeks have been a bit different. She felt symptoms of nausea, lightheadedness, grogginess. These symptoms are similar to when she would be getting closer to her administration of her medication prior to starting study. When she would feel these symptoms, she knew it was time for her drug. Over this past week, she has had issues of going to the bathroom with urgency but just leading to passing gas. She is having liquid stool and will notice just having blood even without a bowel movement. She has pain with bowel movement and has noticed having a slight amount of blood on the tip of stool. She does not remember having an anal fissure. She does not have any perianal fistula drainage. She has not had any fever or chills. She almost feels constipated. She is drinking 1L of water daily past 2 weeks a bit different     7/25/24 Patient has been feeling really well. She does not have any abdominal pain, nausea, constipation or vomiting. She denies any hematochezia. She denies any drainage from her fistula. She has noticed some increase in knee pain but relates this to working out and going up the stairs more and aging and that she has had this knee pain for awhile. She denies any vision changes, redness of eyes, and new rashes. She has some urgency but is much better than previous as she feels that she has control and has been having solid bowel movements.  6/27/2024 Patient has been feeling well over the past two weeks. Has noticed significant improvement in abdominal pain and discomfort when attempting to have bowel movements. Describes resolution of hematochezia, but still soft consistency of stool. Denies nausea, vomiting, and constipation. Has worked closely with our dietician to increase fresh fruits and vegetables in diet. Tolerated previous drug administration well and has felt well between doses. Fistula still not draining. No new joint pain, rash, or other extraintestinal manifestations. Patient noted to have continued elevation in alk phos and liver enzymes. Patient has follow up appointment with OBGYN next week for IUD removal, as it is currently malposition.  6/11/2024 Patient describes feeling very well since trial enrollment. States that she has had more consistent formed bowel movements than previous. Also describes improved abdominal pain, however still present. Perianal fistula still not draining. Currently with 2-4 formed not bloody not painful bowel movements per day. Denies hematochezia or melena. Denies nausea and vomiting.  5/30/2024 This is a 34 year old female with stricturing and fistulizing Crohn's disease previously on IFX q 4 weeks who presents today for enrollment into the DUET-CD trial. Previously visited for screening for Phase III, DUET-CD. Patient continues to have improvement in weakness and lethargy since hospital discharge. She has noticed increasing right knee pain when walking up stairs over the previous one week and denies traumatic injury. She continues to notice occasional drainage from perianal fistula, but denies tenderness to palpation, obvious abscess, or fevers at home. Denies abdominal pain today.  Pt reports since hospital discharge, she is feeling more weak, but starting to gain strength. She has a CSCOPE planned for tomorrow but is going to hold off by 2 weeks in order to recover.  Her hospital course included CT scan as well as anal dilation by Dr. Talamantes in the OR. She developed a fever post-op that has resolved. Cultures negative.  Pt reports much less abd pain with regular laxative use.  CT inpatient: BOWEL: No bowel obstruction. Wall thickening with mucosal hyperenhancement involving 3.7 cm length of the terminal ileum. Mild wall thickening with mucosal enhancement of the rectum. Appendix is normal. Previous rectovaginal and right perianal fistulas are not well visualized on the current exam. PERITONEUM: No ascites. VESSELS: Within normal limits. RETROPERITONEUM/LYMPH NODES: No lymphadenopathy. ABDOMINAL WALL: Within normal limits. BONES: Within normal limits.  IMPRESSION: No significant change since 4/8/2024.  Unchanged mild inflammatory changes of the terminal ileum and rectum. No bowel obstruction.

## 2024-09-23 NOTE — END OF VISIT
[] : Fellow [FreeTextEntry3] : Patient was seen and discussed with fellow Note was edited and amended as necessary Patient was physically seen at [address]

## 2024-09-23 NOTE — ASSESSMENT
[FreeTextEntry1] : 34 year old female with stricturing and fistulizing Crohn's disease previously on IFX q 4 weeks who presents today with multiple complex perianal fistulae with abscesses for an 16 week follow up after trial enrollment. Patient has been feeling well since last visit and only GI-related symptom is urgency which is much improved Patient noted to have continued elevation in alk phos and liver enzymes.  Stricturing and fistulizing Crohn's disease, clinical response - here for week 16 visit - has had improvement since last visit, when she was having painful BMs and bloody BMs, which have resolved now with rectiv, currently has some gurgling in stomach which could be gas related, but overall - no hematochezia, no urgency, formed BMs daily, thus clinical improvement -for gurgling stomach sensation, trial bean-o or gas-x - No adverse events since last study visit - Personally injected study drug into right lower quadrant of abdomen per study protocol. Patient tolerated the injections well and did not have adverse reaction 30 minutes after the injection administration  Anal Fissure -at last visit had painful bowel movements with BRBP and constipation and based upon rectal exam, suspected anal fissure and prescribed rectiv; she took rectiv for last 3 weeks and had improvement in symptoms  -given has been 3 weeks, told to stop rectiv -continue fiber intake -has follow up with colorectal surgery in 1 week  Rash -patient with new rash, that started 2 weeks ago, similar to rash that she had with IFX and was placed on opzelura with resolution of symptoms, currently no systemic signs, thus will refer to dermatology  Elevated LFTs -patient with hx of elevated LFTs -in 8/2024 AST hemolyzed, ALT WNL, alkphos 118, GGT WNL  -in 6/2024 AST 15, ALT 9, T.bili 0.3, AlkPhos 89, GGT elevated at 51 -in 5/2024 AST 22, ALT 19, T.bili 0.5, Alkphos 124, GGT elevated at 83 -given hx of elevated AlkPhos and GGT, obtained following IgG, anti-mitochondrial-Ab, anti-smooth muscle antibodies, GONZALO, IgG4, and alkphos-isoenzymes; results show elevated IgG 1930, IgG4 6.2, AlkPhos Iso WNL, anti-mitochondrial-Ab WNL, anti-smooth muscle antibodies WNL, GONZALO 1:80 homogenous -MRCP obtained with no biliary abnormalities -will continue to monitor as no descernible etiology   Follow up per research protocol    MD Krunal Tim IBD Fellow [facility]   Discussed with Dr Bautista

## 2024-10-24 ENCOUNTER — APPOINTMENT (OUTPATIENT)
Dept: GASTROENTEROLOGY | Facility: CLINIC | Age: 34
End: 2024-10-24

## 2024-10-24 ENCOUNTER — NON-APPOINTMENT (OUTPATIENT)
Age: 34
End: 2024-10-24

## 2024-10-24 VITALS
DIASTOLIC BLOOD PRESSURE: 64 MMHG | TEMPERATURE: 97.7 F | RESPIRATION RATE: 18 BRPM | SYSTOLIC BLOOD PRESSURE: 106 MMHG | HEART RATE: 98 BPM | BODY MASS INDEX: 25.74 KG/M2 | HEIGHT: 67 IN | OXYGEN SATURATION: 99 % | WEIGHT: 164 LBS

## 2024-10-28 ENCOUNTER — APPOINTMENT (OUTPATIENT)
Dept: GASTROENTEROLOGY | Facility: CLINIC | Age: 34
End: 2024-10-28

## 2024-11-06 ENCOUNTER — APPOINTMENT (OUTPATIENT)
Dept: GASTROENTEROLOGY | Facility: CLINIC | Age: 34
End: 2024-11-06
Payer: COMMERCIAL

## 2024-11-06 VITALS
OXYGEN SATURATION: 96 % | HEIGHT: 67 IN | DIASTOLIC BLOOD PRESSURE: 70 MMHG | HEART RATE: 106 BPM | BODY MASS INDEX: 25.27 KG/M2 | SYSTOLIC BLOOD PRESSURE: 130 MMHG | RESPIRATION RATE: 18 BRPM | WEIGHT: 161 LBS | TEMPERATURE: 96.3 F

## 2024-11-06 PROCEDURE — G2211 COMPLEX E/M VISIT ADD ON: CPT | Mod: NC

## 2024-11-06 PROCEDURE — 99215 OFFICE O/P EST HI 40 MIN: CPT

## 2024-11-07 LAB — SARS-COV-2 N GENE NPH QL NAA+PROBE: NOT DETECTED

## 2024-11-08 RX ORDER — POLYETHYLENE GLYCOL 3350 AND ELECTROLYTES WITH LEMON FLAVOR 236; 22.74; 6.74; 5.86; 2.97 G/4L; G/4L; G/4L; G/4L; G/4L
236 POWDER, FOR SOLUTION ORAL
Qty: 1 | Refills: 0 | Status: ACTIVE | COMMUNITY
Start: 2024-11-08 | End: 1900-01-01

## 2024-11-08 RX ORDER — SODIUM PHOSPHATE, DIBASIC AND SODIUM PHOSPHATE, MONOBASIC 7; 19 G/230ML; G/230ML
ENEMA RECTAL
Qty: 1 | Refills: 0 | Status: ACTIVE | COMMUNITY
Start: 2024-11-08 | End: 1900-01-01

## 2024-11-12 ENCOUNTER — APPOINTMENT (OUTPATIENT)
Dept: GASTROENTEROLOGY | Facility: HOSPITAL | Age: 34
End: 2024-11-12

## 2024-11-12 ENCOUNTER — TRANSCRIPTION ENCOUNTER (OUTPATIENT)
Age: 34
End: 2024-11-12

## 2024-11-12 ENCOUNTER — OUTPATIENT (OUTPATIENT)
Dept: OUTPATIENT SERVICES | Facility: HOSPITAL | Age: 34
LOS: 1 days | Discharge: HOME | End: 2024-11-12
Payer: SUBSIDIZED

## 2024-11-12 VITALS — HEIGHT: 67 IN | WEIGHT: 160.94 LBS

## 2024-11-12 DIAGNOSIS — Z00.6 ENCOUNTER FOR EXAMINATION FOR NORMAL COMPARISON AND CONTROL IN CLINICAL RESEARCH PROGRAM: ICD-10-CM

## 2024-11-12 PROCEDURE — 45380 COLONOSCOPY AND BIOPSY: CPT

## 2024-11-12 PROCEDURE — 45378 DIAGNOSTIC COLONOSCOPY: CPT

## 2024-11-12 NOTE — PACU DISCHARGE NOTE - AIRWAY PATENCY:
Confirmed with patient that in the event she were to stop breathing or her heart would stop, she would want all life saving measures including medications, CPR, intubation. Patient verbalizes yes she would. This had already been discussed with the MD. On call MD notified and code status changed to Full code.    Satisfactory

## 2024-11-18 ENCOUNTER — APPOINTMENT (OUTPATIENT)
Dept: GASTROENTEROLOGY | Facility: CLINIC | Age: 34
End: 2024-11-18
Payer: COMMERCIAL

## 2024-11-18 VITALS
WEIGHT: 162 LBS | TEMPERATURE: 96.7 F | HEIGHT: 67 IN | BODY MASS INDEX: 25.43 KG/M2 | RESPIRATION RATE: 17 BRPM | DIASTOLIC BLOOD PRESSURE: 58 MMHG | HEART RATE: 104 BPM | SYSTOLIC BLOOD PRESSURE: 98 MMHG | OXYGEN SATURATION: 98 %

## 2024-11-18 PROCEDURE — 99499A: CUSTOM | Mod: NC

## 2024-11-18 RX ORDER — PREDNISONE 20 MG/1
20 TABLET ORAL DAILY
Qty: 10 | Refills: 2 | Status: ACTIVE | COMMUNITY
Start: 2024-11-18 | End: 1900-01-01

## 2024-11-18 RX ORDER — PREDNISONE 20 MG/1
20 TABLET ORAL DAILY
Qty: 60 | Refills: 1 | Status: ACTIVE | COMMUNITY
Start: 2024-11-18 | End: 1900-01-01

## 2024-12-06 ENCOUNTER — APPOINTMENT (OUTPATIENT)
Dept: COLORECTAL SURGERY | Facility: CLINIC | Age: 34
End: 2024-12-06

## 2024-12-19 ENCOUNTER — APPOINTMENT (OUTPATIENT)
Dept: GASTROENTEROLOGY | Facility: CLINIC | Age: 34
End: 2024-12-19
Payer: COMMERCIAL

## 2024-12-19 VITALS
DIASTOLIC BLOOD PRESSURE: 75 MMHG | SYSTOLIC BLOOD PRESSURE: 116 MMHG | OXYGEN SATURATION: 98 % | HEIGHT: 67 IN | BODY MASS INDEX: 25.58 KG/M2 | TEMPERATURE: 97.3 F | WEIGHT: 163 LBS | HEART RATE: 96 BPM

## 2024-12-19 DIAGNOSIS — K50.90 CROHN'S DISEASE, UNSPECIFIED, W/OUT COMPLICATIONS: ICD-10-CM

## 2024-12-19 PROCEDURE — 99499A: CUSTOM | Mod: NC

## 2025-01-13 ENCOUNTER — APPOINTMENT (OUTPATIENT)
Dept: GASTROENTEROLOGY | Facility: CLINIC | Age: 35
End: 2025-01-13
Payer: COMMERCIAL

## 2025-01-13 ENCOUNTER — NON-APPOINTMENT (OUTPATIENT)
Age: 35
End: 2025-01-13

## 2025-01-13 VITALS
OXYGEN SATURATION: 98 % | HEIGHT: 67 IN | SYSTOLIC BLOOD PRESSURE: 125 MMHG | DIASTOLIC BLOOD PRESSURE: 80 MMHG | BODY MASS INDEX: 25.58 KG/M2 | RESPIRATION RATE: 16 BRPM | TEMPERATURE: 96.3 F | HEART RATE: 94 BPM | WEIGHT: 163 LBS

## 2025-01-13 PROCEDURE — 99499A: CUSTOM | Mod: NC

## 2025-02-06 ENCOUNTER — APPOINTMENT (OUTPATIENT)
Dept: GASTROENTEROLOGY | Facility: CLINIC | Age: 35
End: 2025-02-06
Payer: COMMERCIAL

## 2025-02-06 VITALS
OXYGEN SATURATION: 98 % | WEIGHT: 167 LBS | SYSTOLIC BLOOD PRESSURE: 131 MMHG | BODY MASS INDEX: 26.21 KG/M2 | HEART RATE: 104 BPM | TEMPERATURE: 97.2 F | DIASTOLIC BLOOD PRESSURE: 70 MMHG | HEIGHT: 67 IN

## 2025-02-06 PROCEDURE — 99499A: CUSTOM | Mod: NC

## 2025-03-06 ENCOUNTER — APPOINTMENT (OUTPATIENT)
Dept: GASTROENTEROLOGY | Facility: CLINIC | Age: 35
End: 2025-03-06
Payer: COMMERCIAL

## 2025-03-06 VITALS
WEIGHT: 165.2 LBS | DIASTOLIC BLOOD PRESSURE: 66 MMHG | HEIGHT: 67 IN | OXYGEN SATURATION: 98 % | SYSTOLIC BLOOD PRESSURE: 114 MMHG | TEMPERATURE: 97.8 F | HEART RATE: 90 BPM | BODY MASS INDEX: 25.93 KG/M2

## 2025-03-06 VITALS
WEIGHT: 165.4 LBS | DIASTOLIC BLOOD PRESSURE: 67 MMHG | HEIGHT: 67 IN | OXYGEN SATURATION: 97 % | SYSTOLIC BLOOD PRESSURE: 112 MMHG | HEART RATE: 94 BPM | BODY MASS INDEX: 25.96 KG/M2 | TEMPERATURE: 98.2 F

## 2025-03-06 DIAGNOSIS — K50.90 CROHN'S DISEASE, UNSPECIFIED, W/OUT COMPLICATIONS: ICD-10-CM

## 2025-03-06 PROCEDURE — 99499A: CUSTOM | Mod: NC

## 2025-03-11 ENCOUNTER — APPOINTMENT (OUTPATIENT)
Dept: HEART AND VASCULAR | Facility: CLINIC | Age: 35
End: 2025-03-11

## 2025-03-18 ENCOUNTER — APPOINTMENT (OUTPATIENT)
Dept: HEART AND VASCULAR | Facility: CLINIC | Age: 35
End: 2025-03-18
Payer: COMMERCIAL

## 2025-03-18 ENCOUNTER — NON-APPOINTMENT (OUTPATIENT)
Age: 35
End: 2025-03-18

## 2025-03-18 VITALS
HEIGHT: 67 IN | OXYGEN SATURATION: 98 % | DIASTOLIC BLOOD PRESSURE: 74 MMHG | HEART RATE: 96 BPM | BODY MASS INDEX: 25.9 KG/M2 | WEIGHT: 165 LBS | SYSTOLIC BLOOD PRESSURE: 108 MMHG

## 2025-03-18 DIAGNOSIS — R55 SYNCOPE AND COLLAPSE: ICD-10-CM

## 2025-03-18 DIAGNOSIS — R00.2 PALPITATIONS: ICD-10-CM

## 2025-03-18 DIAGNOSIS — Z13.1 ENCOUNTER FOR SCREENING FOR DIABETES MELLITUS: ICD-10-CM

## 2025-03-18 DIAGNOSIS — Z13.220 ENCOUNTER FOR SCREENING FOR LIPOID DISORDERS: ICD-10-CM

## 2025-03-18 DIAGNOSIS — R07.89 OTHER CHEST PAIN: ICD-10-CM

## 2025-03-18 PROCEDURE — 99204 OFFICE O/P NEW MOD 45 MIN: CPT

## 2025-03-18 PROCEDURE — 93000 ELECTROCARDIOGRAM COMPLETE: CPT

## 2025-03-18 PROCEDURE — 93306 TTE W/DOPPLER COMPLETE: CPT

## 2025-03-18 PROCEDURE — 36415 COLL VENOUS BLD VENIPUNCTURE: CPT

## 2025-03-20 ENCOUNTER — NON-APPOINTMENT (OUTPATIENT)
Age: 35
End: 2025-03-20

## 2025-03-20 LAB
ALBUMIN SERPL ELPH-MCNC: 3.5 G/DL
ALP BLD-CCNC: 118 U/L
ALT SERPL-CCNC: 14 U/L
ANION GAP SERPL CALC-SCNC: 10 MMOL/L
AST SERPL-CCNC: 18 U/L
BILIRUB SERPL-MCNC: 0.3 MG/DL
BUN SERPL-MCNC: 9 MG/DL
CALCIUM SERPL-MCNC: 9.5 MG/DL
CHLORIDE SERPL-SCNC: 101 MMOL/L
CHOLEST SERPL-MCNC: 119 MG/DL
CO2 SERPL-SCNC: 26 MMOL/L
CREAT SERPL-MCNC: 0.77 MG/DL
EGFRCR SERPLBLD CKD-EPI 2021: 102 ML/MIN/1.73M2
ESTIMATED AVERAGE GLUCOSE: 108 MG/DL
GLUCOSE SERPL-MCNC: 89 MG/DL
HBA1C MFR BLD HPLC: 5.4 %
HCT VFR BLD CALC: 36.9 %
HDLC SERPL-MCNC: 47 MG/DL
HGB BLD-MCNC: 11.3 G/DL
LDLC SERPL-MCNC: 61 MG/DL
MCHC RBC-ENTMCNC: 26.7 PG
MCHC RBC-ENTMCNC: 30.6 G/DL
MCV RBC AUTO: 87.2 FL
NONHDLC SERPL-MCNC: 72 MG/DL
PLATELET # BLD AUTO: 462 K/UL
POTASSIUM SERPL-SCNC: 4.4 MMOL/L
PROT SERPL-MCNC: 8.2 G/DL
RBC # BLD: 4.23 M/UL
RBC # FLD: 14.7 %
SODIUM SERPL-SCNC: 136 MMOL/L
TRIGL SERPL-MCNC: 43 MG/DL
TSH SERPL-ACNC: 0.9 UIU/ML
WBC # FLD AUTO: 9.52 K/UL

## 2025-04-07 ENCOUNTER — APPOINTMENT (OUTPATIENT)
Dept: GASTROENTEROLOGY | Facility: CLINIC | Age: 35
End: 2025-04-07

## 2025-04-07 ENCOUNTER — APPOINTMENT (OUTPATIENT)
Dept: GASTROENTEROLOGY | Facility: CLINIC | Age: 35
End: 2025-04-07
Payer: COMMERCIAL

## 2025-04-07 VITALS
SYSTOLIC BLOOD PRESSURE: 126 MMHG | DIASTOLIC BLOOD PRESSURE: 73 MMHG | RESPIRATION RATE: 14 BRPM | HEIGHT: 67 IN | TEMPERATURE: 96.7 F | HEART RATE: 73 BPM | BODY MASS INDEX: 25.27 KG/M2 | WEIGHT: 161 LBS

## 2025-04-07 PROCEDURE — 99499A: CUSTOM | Mod: NC

## 2025-04-08 ENCOUNTER — APPOINTMENT (OUTPATIENT)
Dept: HEART AND VASCULAR | Facility: CLINIC | Age: 35
End: 2025-04-08

## 2025-04-22 RX ORDER — POLYETHYLENE GLYCOL 3350 AND ELECTROLYTES WITH LEMON FLAVOR 236; 22.74; 6.74; 5.86; 2.97 G/4L; G/4L; G/4L; G/4L; G/4L
236 POWDER, FOR SOLUTION ORAL
Qty: 1 | Refills: 0 | Status: ACTIVE | COMMUNITY
Start: 2025-04-22 | End: 1900-01-01

## 2025-04-29 ENCOUNTER — APPOINTMENT (OUTPATIENT)
Dept: GASTROENTEROLOGY | Facility: HOSPITAL | Age: 35
End: 2025-04-29

## 2025-04-29 ENCOUNTER — OUTPATIENT (OUTPATIENT)
Dept: OUTPATIENT SERVICES | Facility: HOSPITAL | Age: 35
LOS: 1 days | Discharge: HOME | End: 2025-04-29
Payer: SUBSIDIZED

## 2025-04-29 VITALS — HEIGHT: 67 IN | WEIGHT: 160.94 LBS

## 2025-04-29 PROCEDURE — 45380 COLONOSCOPY AND BIOPSY: CPT

## 2025-04-29 PROCEDURE — 45382 COLONOSCOPY W/CONTROL BLEED: CPT

## 2025-04-29 PROCEDURE — 45382 COLONOSCOPY W/CONTROL BLEED: CPT | Mod: XS

## 2025-04-29 PROCEDURE — 45378 DIAGNOSTIC COLONOSCOPY: CPT

## 2025-04-29 PROCEDURE — C1889: CPT

## 2025-04-29 DEVICE — CLIP RESOLUTION 360 ULTRA 235CM 20/BX: Type: IMPLANTABLE DEVICE | Status: FUNCTIONAL

## 2025-05-05 ENCOUNTER — APPOINTMENT (OUTPATIENT)
Dept: GASTROENTEROLOGY | Facility: CLINIC | Age: 35
End: 2025-05-05
Payer: COMMERCIAL

## 2025-05-05 VITALS
DIASTOLIC BLOOD PRESSURE: 72 MMHG | WEIGHT: 160 LBS | HEIGHT: 67 IN | SYSTOLIC BLOOD PRESSURE: 118 MMHG | OXYGEN SATURATION: 98 % | BODY MASS INDEX: 25.11 KG/M2 | TEMPERATURE: 97.2 F | HEART RATE: 84 BPM | RESPIRATION RATE: 15 BRPM

## 2025-05-05 DIAGNOSIS — K50.90 CROHN'S DISEASE, UNSPECIFIED, W/OUT COMPLICATIONS: ICD-10-CM

## 2025-05-05 PROCEDURE — 99499A: CUSTOM | Mod: NC

## 2025-05-29 ENCOUNTER — APPOINTMENT (OUTPATIENT)
Dept: GASTROENTEROLOGY | Facility: CLINIC | Age: 35
End: 2025-05-29
Payer: COMMERCIAL

## 2025-05-29 VITALS
WEIGHT: 163 LBS | BODY MASS INDEX: 25.58 KG/M2 | SYSTOLIC BLOOD PRESSURE: 123 MMHG | DIASTOLIC BLOOD PRESSURE: 70 MMHG | HEIGHT: 67 IN | HEART RATE: 107 BPM | OXYGEN SATURATION: 99 % | TEMPERATURE: 97.2 F

## 2025-05-29 DIAGNOSIS — R53.83 OTHER FATIGUE: ICD-10-CM

## 2025-05-29 DIAGNOSIS — K50.90 CROHN'S DISEASE, UNSPECIFIED, W/OUT COMPLICATIONS: ICD-10-CM

## 2025-05-29 PROCEDURE — 36415 COLL VENOUS BLD VENIPUNCTURE: CPT

## 2025-05-29 PROCEDURE — 99499A: CUSTOM | Mod: NC

## 2025-05-30 LAB
BASOPHILS # BLD AUTO: 0.07 K/UL
BASOPHILS NFR BLD AUTO: 0.7 %
EOSINOPHIL # BLD AUTO: 0.27 K/UL
EOSINOPHIL NFR BLD AUTO: 2.7 %
HCT VFR BLD CALC: 31.9 %
HGB BLD-MCNC: 10.1 G/DL
IMM GRANULOCYTES NFR BLD AUTO: 0.2 %
LYMPHOCYTES # BLD AUTO: 2.03 K/UL
LYMPHOCYTES NFR BLD AUTO: 20.5 %
MAN DIFF?: NORMAL
MCHC RBC-ENTMCNC: 26.4 PG
MCHC RBC-ENTMCNC: 31.7 G/DL
MCV RBC AUTO: 83.5 FL
MONOCYTES # BLD AUTO: 1.04 K/UL
MONOCYTES NFR BLD AUTO: 10.5 %
NEUTROPHILS # BLD AUTO: 6.49 K/UL
NEUTROPHILS NFR BLD AUTO: 65.4 %
PLATELET # BLD AUTO: 454 K/UL
PMV BLD AUTO: 0 /100 WBCS
RBC # BLD: 3.82 M/UL
RBC # FLD: 14.7 %
WBC # FLD AUTO: 9.92 K/UL

## 2025-06-03 ENCOUNTER — NON-APPOINTMENT (OUTPATIENT)
Age: 35
End: 2025-06-03

## 2025-06-03 LAB
25(OH)D3 SERPL-MCNC: 21.1 NG/ML
ALBUMIN SERPL ELPH-MCNC: 3.3 G/DL
ALP BLD-CCNC: 121 U/L
ALT SERPL-CCNC: 20 U/L
ANION GAP SERPL CALC-SCNC: 14 MMOL/L
AST SERPL-CCNC: 21 U/L
BILIRUB SERPL-MCNC: 0.2 MG/DL
BUN SERPL-MCNC: 8 MG/DL
CALCIUM SERPL-MCNC: 9.2 MG/DL
CHLORIDE SERPL-SCNC: 103 MMOL/L
CO2 SERPL-SCNC: 21 MMOL/L
CREAT SERPL-MCNC: 0.88 MG/DL
CRP SERPL-MCNC: 31 MG/L
EGFRCR SERPLBLD CKD-EPI 2021: 87 ML/MIN/1.73M2
FOLATE SERPL-MCNC: >20 NG/ML
GLUCOSE SERPL-MCNC: 69 MG/DL
IRON SATN MFR SERPL: 7 %
IRON SERPL-MCNC: 19 UG/DL
POTASSIUM SERPL-SCNC: 4.7 MMOL/L
PROT SERPL-MCNC: 7.5 G/DL
SODIUM SERPL-SCNC: 138 MMOL/L
TIBC SERPL-MCNC: 296 UG/DL
TSH SERPL-ACNC: 0.72 UIU/ML
UIBC SERPL-MCNC: 276 UG/DL
VIT B12 SERPL-MCNC: 503 PG/ML

## 2025-06-09 ENCOUNTER — EMERGENCY (EMERGENCY)
Facility: HOSPITAL | Age: 35
LOS: 1 days | End: 2025-06-09
Attending: EMERGENCY MEDICINE | Admitting: EMERGENCY MEDICINE
Payer: COMMERCIAL

## 2025-06-09 VITALS
OXYGEN SATURATION: 100 % | SYSTOLIC BLOOD PRESSURE: 115 MMHG | DIASTOLIC BLOOD PRESSURE: 76 MMHG | HEART RATE: 100 BPM | RESPIRATION RATE: 16 BRPM | TEMPERATURE: 98 F | WEIGHT: 162.92 LBS | HEIGHT: 67 IN

## 2025-06-09 VITALS
HEART RATE: 85 BPM | OXYGEN SATURATION: 100 % | SYSTOLIC BLOOD PRESSURE: 111 MMHG | TEMPERATURE: 98 F | DIASTOLIC BLOOD PRESSURE: 75 MMHG | RESPIRATION RATE: 16 BRPM

## 2025-06-09 LAB
ADD ON TEST-SPECIMEN IN LAB: SIGNIFICANT CHANGE UP
ANION GAP SERPL CALC-SCNC: 7 MMOL/L — SIGNIFICANT CHANGE UP (ref 5–17)
BASOPHILS # BLD AUTO: 0.05 K/UL — SIGNIFICANT CHANGE UP (ref 0–0.2)
BASOPHILS NFR BLD AUTO: 0.5 % — SIGNIFICANT CHANGE UP (ref 0–2)
BLD GP AB SCN SERPL QL: NEGATIVE — SIGNIFICANT CHANGE UP
BUN SERPL-MCNC: 9 MG/DL — SIGNIFICANT CHANGE UP (ref 7–23)
CALCIUM SERPL-MCNC: 8.6 MG/DL — SIGNIFICANT CHANGE UP (ref 8.4–10.5)
CHLORIDE SERPL-SCNC: 103 MMOL/L — SIGNIFICANT CHANGE UP (ref 96–108)
CO2 SERPL-SCNC: 27 MMOL/L — SIGNIFICANT CHANGE UP (ref 22–31)
CREAT SERPL-MCNC: 0.7 MG/DL — SIGNIFICANT CHANGE UP (ref 0.5–1.3)
D DIMER BLD IA.RAPID-MCNC: 594 NG/ML DDU — HIGH
EGFR: 114 ML/MIN/1.73M2 — SIGNIFICANT CHANGE UP
EGFR: 114 ML/MIN/1.73M2 — SIGNIFICANT CHANGE UP
EOSINOPHIL # BLD AUTO: 0.31 K/UL — SIGNIFICANT CHANGE UP (ref 0–0.5)
EOSINOPHIL NFR BLD AUTO: 3.2 % — SIGNIFICANT CHANGE UP (ref 0–6)
GLUCOSE SERPL-MCNC: 87 MG/DL — SIGNIFICANT CHANGE UP (ref 70–99)
HCT VFR BLD CALC: 33.1 % — LOW (ref 34.5–45)
HGB BLD-MCNC: 10.7 G/DL — LOW (ref 11.5–15.5)
IMM GRANULOCYTES NFR BLD AUTO: 0.2 % — SIGNIFICANT CHANGE UP (ref 0–0.9)
LYMPHOCYTES # BLD AUTO: 2.48 K/UL — SIGNIFICANT CHANGE UP (ref 1–3.3)
LYMPHOCYTES # BLD AUTO: 25.6 % — SIGNIFICANT CHANGE UP (ref 13–44)
MCHC RBC-ENTMCNC: 26.7 PG — LOW (ref 27–34)
MCHC RBC-ENTMCNC: 32.3 G/DL — SIGNIFICANT CHANGE UP (ref 32–36)
MCV RBC AUTO: 82.5 FL — SIGNIFICANT CHANGE UP (ref 80–100)
MONOCYTES # BLD AUTO: 1.15 K/UL — HIGH (ref 0–0.9)
MONOCYTES NFR BLD AUTO: 11.9 % — SIGNIFICANT CHANGE UP (ref 2–14)
NEUTROPHILS # BLD AUTO: 5.68 K/UL — SIGNIFICANT CHANGE UP (ref 1.8–7.4)
NEUTROPHILS NFR BLD AUTO: 58.6 % — SIGNIFICANT CHANGE UP (ref 43–77)
NRBC BLD AUTO-RTO: 0 /100 WBCS — SIGNIFICANT CHANGE UP (ref 0–0)
PLATELET # BLD AUTO: 401 K/UL — HIGH (ref 150–400)
POTASSIUM SERPL-MCNC: 4.3 MMOL/L — SIGNIFICANT CHANGE UP (ref 3.5–5.3)
POTASSIUM SERPL-SCNC: 4.3 MMOL/L — SIGNIFICANT CHANGE UP (ref 3.5–5.3)
RBC # BLD: 4.01 M/UL — SIGNIFICANT CHANGE UP (ref 3.8–5.2)
RBC # FLD: 14.8 % — HIGH (ref 10.3–14.5)
RH IG SCN BLD-IMP: POSITIVE — SIGNIFICANT CHANGE UP
SODIUM SERPL-SCNC: 137 MMOL/L — SIGNIFICANT CHANGE UP (ref 135–145)
TROPONIN T, HIGH SENSITIVITY RESULT: <6 NG/L — SIGNIFICANT CHANGE UP (ref 0–51)
WBC # BLD: 9.69 K/UL — SIGNIFICANT CHANGE UP (ref 3.8–10.5)
WBC # FLD AUTO: 9.69 K/UL — SIGNIFICANT CHANGE UP (ref 3.8–10.5)

## 2025-06-09 PROCEDURE — 85025 COMPLETE CBC W/AUTO DIFF WBC: CPT

## 2025-06-09 PROCEDURE — 85379 FIBRIN DEGRADATION QUANT: CPT

## 2025-06-09 PROCEDURE — 83880 ASSAY OF NATRIURETIC PEPTIDE: CPT

## 2025-06-09 PROCEDURE — 71046 X-RAY EXAM CHEST 2 VIEWS: CPT

## 2025-06-09 PROCEDURE — 36415 COLL VENOUS BLD VENIPUNCTURE: CPT

## 2025-06-09 PROCEDURE — 99284 EMERGENCY DEPT VISIT MOD MDM: CPT | Mod: GC

## 2025-06-09 PROCEDURE — 80048 BASIC METABOLIC PNL TOTAL CA: CPT

## 2025-06-09 PROCEDURE — 71046 X-RAY EXAM CHEST 2 VIEWS: CPT | Mod: 26

## 2025-06-09 PROCEDURE — 71275 CT ANGIOGRAPHY CHEST: CPT

## 2025-06-09 PROCEDURE — 99284 EMERGENCY DEPT VISIT MOD MDM: CPT | Mod: 25

## 2025-06-09 PROCEDURE — 86850 RBC ANTIBODY SCREEN: CPT

## 2025-06-09 PROCEDURE — 86901 BLOOD TYPING SEROLOGIC RH(D): CPT

## 2025-06-09 PROCEDURE — 71275 CT ANGIOGRAPHY CHEST: CPT | Mod: 26

## 2025-06-09 PROCEDURE — 84484 ASSAY OF TROPONIN QUANT: CPT

## 2025-06-09 PROCEDURE — 99285 EMERGENCY DEPT VISIT HI MDM: CPT

## 2025-06-09 PROCEDURE — 86900 BLOOD TYPING SEROLOGIC ABO: CPT

## 2025-06-09 NOTE — ED PROVIDER NOTE - CLINICAL SUMMARY MEDICAL DECISION MAKING FREE TEXT BOX
Patient with a history of Crohn's disease present emergency room shortness of breath and chest tightness as well as history of anemia.  Patient well-appearing in no acute distress good auscultation bilaterally no signs of DVT.  Abdomen soft.  Clear etiology possible worsening anemia low suspicion for ACS, pulmonary edema.  Given inflammatory disease will rule out PE.  If workup reassuring can likely discharge patient home with instructions for close outpatient follow-up.

## 2025-06-09 NOTE — ED PROVIDER NOTE - CONSTITUTIONAL, MLM
normal... Well appearing, awake, alert, oriented to person, place, time/situation and in no apparent distress, pale appearing

## 2025-06-09 NOTE — ED ADULT TRIAGE NOTE - GLASGOW COMA SCALE: EYE OPENING, MLM
Doxycycline Counseling:  Patient counseled regarding possible photosensitivity and increased risk for sunburn.  Patient instructed to avoid sunlight, if possible.  When exposed to sunlight, patients should wear protective clothing, sunglasses, and sunscreen.  The patient was instructed to call the office immediately if the following severe adverse effects occur:  hearing changes, easy bruising/bleeding, severe headache, or vision changes.  The patient verbalized understanding of the proper use and possible adverse effects of doxycycline.  All of the patient's questions and concerns were addressed. (E4) spontaneous Topical Sulfur Applications Counseling: Topical Sulfur Counseling: Patient counseled that this medication may cause skin irritation or allergic reactions.  In the event of skin irritation, the patient was advised to reduce the amount of the drug applied or use it less frequently.   The patient verbalized understanding of the proper use and possible adverse effects of topical sulfur application.  All of the patient's questions and concerns were addressed. Tazorac Pregnancy And Lactation Text: This medication is not safe during pregnancy. It is unknown if this medication is excreted in breast milk. Benzoyl Peroxide Counseling: Patient counseled that medicine may cause skin irritation and bleach clothing.  In the event of skin irritation, the patient was advised to reduce the amount of the drug applied or use it less frequently.   The patient verbalized understanding of the proper use and possible adverse effects of benzoyl peroxide.  All of the patient's questions and concerns were addressed. High Dose Vitamin A Counseling: Side effects reviewed, pt to contact office should one occur. Birth Control Pills Counseling: Birth Control Pill Counseling: I discussed with the patient the potential side effects of OCPs including but not limited to increased risk of stroke, heart attack, thrombophlebitis, deep venous thrombosis, hepatic adenomas, breast changes, GI upset, headaches, and depression.  The patient verbalized understanding of the proper use and possible adverse effects of OCPs. All of the patient's questions and concerns were addressed. Benzoyl Peroxide Pregnancy And Lactation Text: This medication is Pregnancy Category C. It is unknown if benzoyl peroxide is excreted in breast milk. Doxycycline Pregnancy And Lactation Text: This medication is Pregnancy Category D and not consider safe during pregnancy. It is also excreted in breast milk but is considered safe for shorter treatment courses. Isotretinoin Pregnancy And Lactation Text: This medication is Pregnancy Category X and is considered extremely dangerous during pregnancy. It is unknown if it is excreted in breast milk. Topical Retinoid counseling:  Patient advised to apply a pea-sized amount only at bedtime and wait 30 minutes after washing their face before applying.  If too drying, patient may add a non-comedogenic moisturizer. The patient verbalized understanding of the proper use and possible adverse effects of retinoids.  All of the patient's questions and concerns were addressed. Tetracycline Counseling: Patient counseled regarding possible photosensitivity and increased risk for sunburn.  Patient instructed to avoid sunlight, if possible.  When exposed to sunlight, patients should wear protective clothing, sunglasses, and sunscreen.  The patient was instructed to call the office immediately if the following severe adverse effects occur:  hearing changes, easy bruising/bleeding, severe headache, or vision changes.  The patient verbalized understanding of the proper use and possible adverse effects of tetracycline.  All of the patient's questions and concerns were addressed. Patient understands to avoid pregnancy while on therapy due to potential birth defects. Bactrim Pregnancy And Lactation Text: This medication is Pregnancy Category D and is known to cause fetal risk.  It is also excreted in breast milk. Erythromycin Counseling:  I discussed with the patient the risks of erythromycin including but not limited to GI upset, allergic reaction, drug rash, diarrhea, increase in liver enzymes, and yeast infections. Azithromycin Pregnancy And Lactation Text: This medication is considered safe during pregnancy and is also secreted in breast milk. Erythromycin Pregnancy And Lactation Text: This medication is Pregnancy Category B and is considered safe during pregnancy. It is also excreted in breast milk. Tetracycline Pregnancy And Lactation Text: This medication is Pregnancy Category D and not consider safe during pregnancy. It is also excreted in breast milk. Birth Control Pills Pregnancy And Lactation Text: This medication should be avoided if pregnant and for the first 30 days post-partum. Azithromycin Counseling:  I discussed with the patient the risks of azithromycin including but not limited to GI upset, allergic reaction, drug rash, diarrhea, and yeast infections. Tazorac Counseling:  Patient advised that medication is irritating and drying.  Patient may need to apply sparingly and wash off after an hour before eventually leaving it on overnight.  The patient verbalized understanding of the proper use and possible adverse effects of tazorac.  All of the patient's questions and concerns were addressed. Topical Clindamycin Pregnancy And Lactation Text: This medication is Pregnancy Category B and is considered safe during pregnancy. It is unknown if it is excreted in breast milk. Spironolactone Pregnancy And Lactation Text: This medication can cause feminization of the male fetus and should be avoided during pregnancy. The active metabolite is also found in breast milk. Bactrim Counseling:  I discussed with the patient the risks of sulfa antibiotics including but not limited to GI upset, allergic reaction, drug rash, diarrhea, dizziness, photosensitivity, and yeast infections.  Rarely, more serious reactions can occur including but not limited to aplastic anemia, agranulocytosis, methemoglobinemia, blood dyscrasias, liver or kidney failure, lung infiltrates or desquamative/blistering drug rashes. Topical Retinoid Pregnancy And Lactation Text: This medication is Pregnancy Category C. It is unknown if this medication is excreted in breast milk. Dapsone Pregnancy And Lactation Text: This medication is Pregnancy Category C and is not considered safe during pregnancy or breast feeding. Minocycline Counseling: Patient advised regarding possible photosensitivity and discoloration of the teeth, skin, lips, tongue and gums.  Patient instructed to avoid sunlight, if possible.  When exposed to sunlight, patients should wear protective clothing, sunglasses, and sunscreen.  The patient was instructed to call the office immediately if the following severe adverse effects occur:  hearing changes, easy bruising/bleeding, severe headache, or vision changes.  The patient verbalized understanding of the proper use and possible adverse effects of minocycline.  All of the patient's questions and concerns were addressed. Isotretinoin Counseling: Patient should get monthly blood tests, not donate blood, not drive at night if vision affected, not share medication, and not undergo elective surgery for 6 months after tx completed. Side effects reviewed, pt to contact office should one occur. Dapsone Counseling: I discussed with the patient the risks of dapsone including but not limited to hemolytic anemia, agranulocytosis, rashes, methemoglobinemia, kidney failure, peripheral neuropathy, headaches, GI upset, and liver toxicity.  Patients who start dapsone require monitoring including baseline LFTs and weekly CBCs for the first month, then every month thereafter.  The patient verbalized understanding of the proper use and possible adverse effects of dapsone.  All of the patient's questions and concerns were addressed. Topical Sulfur Applications Pregnancy And Lactation Text: This medication is Pregnancy Category C and has an unknown safety profile during pregnancy. It is unknown if this topical medication is excreted in breast milk. Detail Level: Zone Spironolactone Counseling: Patient advised regarding risks of diarrhea, abdominal pain, hyperkalemia, birth defects (for female patients), liver toxicity and renal toxicity. The patient may need blood work to monitor liver and kidney function and potassium levels while on therapy. The patient verbalized understanding of the proper use and possible adverse effects of spironolactone.  All of the patient's questions and concerns were addressed. High Dose Vitamin A Pregnancy And Lactation Text: High dose vitamin A therapy is contraindicated during pregnancy and breast feeding. Topical Clindamycin Counseling: Patient counseled that this medication may cause skin irritation or allergic reactions.  In the event of skin irritation, the patient was advised to reduce the amount of the drug applied or use it less frequently.   The patient verbalized understanding of the proper use and possible adverse effects of clindamycin.  All of the patient's questions and concerns were addressed.

## 2025-06-09 NOTE — CONSULT NOTE ADULT - TIME BILLING
Time spent includes direct patient care (interview and examination of patient), discussion with other providers, support staff and/or patient's family members, review of medical records, ordering diagnostic tests, and analyzing results, documentation.

## 2025-06-09 NOTE — ED ADULT NURSE NOTE - NSFALLUNIVINTERV_ED_ALL_ED
Bed/Stretcher in lowest position, wheels locked, appropriate side rails in place/Call bell, personal items and telephone in reach/Instruct patient to call for assistance before getting out of bed/chair/stretcher/Non-slip footwear applied when patient is off stretcher/[place] to call system/Physically safe environment - no spills, clutter or unnecessary equipment/Purposeful proactive rounding/Room/bathroom lighting operational, light cord in reach

## 2025-06-09 NOTE — ED PROVIDER NOTE - NSFOLLOWUPCLINICS_GEN_ALL_ED_FT
[Home] : at home, [unfilled] , at the time of the visit. [Medical Office: ([facility])___] : at the medical office located in  [Patient] : the patient [FreeTextEntry1] : medication refill [de-identified] : medication renewal Cardiology at [facility]  Cardiology  [address] [place] [address], [address] [place] [address]  Phone: [phone]  Fax:

## 2025-06-09 NOTE — ED PROVIDER NOTE - OBJECTIVE STATEMENT
Patient is a 35-year-old female with severe Crohn's disease on experimental monoclonal therapy present Emergency Department with shortness of breath and chest tightness.  Patient states the symptoms have been ongoing steadily worsening.  He followed up with her primary and GI was told that she likely is experiencing the symptoms due to iron deficiency anemia.  Patient reports stable blood in the stool denies any fevers abdominal pain.  Reports shortness of breath and chest tightness with exertion not at rest.  Denies any syncope visual changes focal weakness numbness or tingling.  Denies any leg swelling, recent travel, prior DVT or PE, sick contacts, cough, fever.

## 2025-06-09 NOTE — CONSULT NOTE ADULT - SUBJECTIVE AND OBJECTIVE BOX
GASTROENTEROLOGY CONSULT NOTE  HPI: 34 yo female w/ hx of stricturing and fistulizing Crohn's disease (dx 2020), previously on IFX who is currently enrolled in a randomized clinical trial (Guselkumab vs. Golimuab) who presents with shortness of breath and chest pain with exertion. Patient has felt generalized fatigue for the past few weeks. This weekend patient began to feel SOB w/ exertion. After walking for around a block patient begins to feel SOB and gets substernal chest discomfort which then quickly resolves with rest. No chest pain or SOB at rest. No cough or sputum production. No fever or chills. Patient feels that her Crohn's symptoms are well controlled. No abdominal pain, nausea, vomiting, melena, or hematochezia. Having her baseline 2 brown BMs daily, one well formed and one soft/loose stool. No increased fecal urgency or fecal incontinence.     Allergies    Nuts (Anaphylaxis)  No Known Drug Allergies  Seafood (Anaphylaxis)  Soy (Anaphylaxis)  Felt throat closing after getting &quot;oral contrast&quot; many yrs ago. Has had oral contrast since then w/o issues. Has never had reaction to IV contrast. (Unknown)    Intolerances      Home Medications:    MEDICATIONS:  MEDICATIONS  (STANDING):    MEDICATIONS  (PRN):    PAST MEDICAL & SURGICAL HISTORY:  Crohn disease      No significant past surgical history        FAMILY HISTORY:  No pertinent family history in first degree relatives      SOCIAL HISTORY:  Tobacco: [ ] Current, [ ] Former, [ ] Never; Pack Years:  Alcohol:  Illicit Drugs:    REVIEW OF SYSTEMS:  All other 10 review of systems is negative unless indicated above.    Vital Signs Last 24 Hrs  T(C): 36.9 (09 Jun 2025 16:20), Max: 36.9 (09 Jun 2025 16:20)  T(F): 98.4 (09 Jun 2025 16:20), Max: 98.4 (09 Jun 2025 16:20)  HR: 100 (09 Jun 2025 16:20) (100 - 100)  BP: 115/76 (09 Jun 2025 16:20) (115/76 - 115/76)  BP(mean): --  RR: 16 (09 Jun 2025 16:20) (16 - 16)  SpO2: 100% (09 Jun 2025 16:20) (100% - 100%)    Parameters below as of 09 Jun 2025 16:20  Patient On (Oxygen Delivery Method): room air          PHYSICAL EXAM:    General: lying in bed, in no acute distress  HEENT: Neck supple, mmm, no jvd  Lungs: Normal respiratory effort, no intercostal retractions  Cardiovascular: regular rate  Abdomen: Soft, non-tender non-distended; No rebound or guarding  Neurological: AOx3, speech fluent  Skin: Warm and dry. No obvious rash    LABS:                        10.7   9.69  )-----------( 401      ( 09 Jun 2025 17:31 )             33.1                   RADIOLOGY & ADDITIONAL STUDIES:     Reviewed

## 2025-06-09 NOTE — CONSULT NOTE ADULT - ASSESSMENT
34 yo female w/ hx of stricturing and fistulizing Crohn's disease (dx 2020), previously on IFX who is currently enrolled in a randomized clinical trial (Guselkumab vs. Golimuab) who presents with shortness of breath and chest pain with exertion and GI was consulted given hx of Crohn's:     Prior Hgb on 5/5/25 was 11.6, today is 10.7. CXR w/ no acute pathology identified. CT PE pending given D dimer of 594.     Recommendations:  - F/u CT PE     Case seen and discussed w/ GI attending Dr. Sd Tamez MD  PGY-4 GI Fellow  GI consult pager (M-F [id number]): [phone]  Please call  for on-call fellow after hours

## 2025-06-09 NOTE — ED PROVIDER NOTE - PROGRESS NOTE DETAILS
MK- Patient signed out to me for follow-up repeat CT read troponin proBNP negative hemoglobin 10.7 no indication for transfusion patient well-appearing will advise for close PMD cardiology and GI follow-up

## 2025-06-09 NOTE — ED PROVIDER NOTE - PATIENT PORTAL LINK FT
You can access the FollowMyHealth Patient Portal offered by [facility] by registering at the following website: [email or web address]. By joining [facility]’s FollowMyHealth portal, you will also be able to view your health information using other applications (apps) compatible with our system.

## 2025-06-09 NOTE — CONSULT NOTE ADULT - ATTENDING COMMENTS
35F PMHx stricturing and fistulizing Crohn's disease (dx 2020), previously on IFX who is currently enrolled in a randomized clinical trial (Guselkumab vs. Golimuab) presented to ED with shortness of breath and chest pain with exertion, GI was consulted given hx of Crohn's. D dimer elevated. First CT PE indeterminate, pending repeat CT PE study.     No GI related complaints including diarrhea, bloody or black BMs, abdominal pain, nausea/vomiting.     Remainder of management as per ED team    Discussed above noted plan with patient, Dr Tamez, ED.

## 2025-06-09 NOTE — ED PROVIDER NOTE - NSFOLLOWUPINSTRUCTIONS_ED_ALL_ED_FT
HIV Infection    WHAT YOU NEED TO KNOW:    HIV (human immunodeficiency virus) is a viral infection that slowly weakens your immune system. The virus kills a type of white blood cell called CD4. A normal CD4 count ranges from 500 to 2,000. You have HIV when your CD4 count ranges from 200 to 500. You have AIDS (acquired immune deficiency syndrome) when your CD4 count is less than 200. AIDS is the final stage of HIV infection. AIDS means your immune system cannot fight off infections and disease. This can become life-threatening. Seek care immediately if you think you may have been exposed to HIV. Drug treatments are available after exposure to HIV.    DISCHARGE INSTRUCTIONS:    Call your local emergency number (911 in the ) if:    You have trouble breathing.    You have a seizure.  Return to the emergency department if:    You have a fever and night sweats.    You cough up blood.    You have a headache and a stiff neck.    You are confused and notice changes in the way you think.  Call your doctor if:    You are having side effects from your medicines that make you want to stop taking them.    You are more tired than usual or you have lost weight without trying.    You have ongoing nausea, vomiting, and diarrhea.    You have white vaginal discharge and vaginal pain or swelling.    You see white spots or hairy patches inside your mouth.    You have a rash, blisters, bruises, or other skin changes.    You have a cough that will not go away.    You have questions or concerns about your condition or care.  Medicines: Tell your provider if you ever had an allergic reaction to or other problem with any medicine. You may need any of the following:    Antiretroviral medicines slow the progression of HIV. They are given in different combinations called highly active antiretroviral therapy (HAART). Your healthcare provider will decide the kind of HAART you need. You may need to make HAART changes if you have severe side effects or develop resistance to a medicine.    Antimicrobial medicines help treat or prevent bacterial, viral, or fungal infections.    Antinausea medicine helps calm your stomach and prevent vomiting.    Prescription pain medicine may be given. Ask how to take this medicine safely.    Antidepressants may be given to help improve your mood.    Preventative medicines help protect you from opportunistic infections. The infections can be very dangerous for a person who has an HIV infection. Examples include toxoplasmosis, Pneumocystis pneumonia (PCP), and tuberculosis.    Take your medicine as directed. Contact your healthcare provider if you think your medicine is not helping or if you have side effects. Tell your provider if you are allergic to any medicine. Keep a list of the medicines, vitamins, and herbs you take. Include the amounts, and when and why you take them. Bring the list or the pill bottles to follow-up visits. Carry your medicine list with you in case of an emergency.  Improve your quality of life: Early treatment and good management can help you live for years with an HIV infection. You will need to learn about HIV and manage your health to improve your quality of life. Do the following to help keep your immune system strong:    Talk to your healthcare provider about your family medical history. Tell your provider about any disease that runs in your family. Examples include cancer, heart disease, diabetes, and arthritis. Some diseases can weaken the immune system. Tell your provider about all medicines you take. This will help your provider make sure your medicines do not interfere with HIV medicines.    Keep all follow-up visits. You will need to visit your provider for blood tests and frequent physical exams. These can help your provider find and possibly treat a health condition that develops. You may also be screened for certain infections, such as tuberculosis. Your provider may ask about support you have from family or friends to manage HIV. Tell your provider how you are feeling emotionally and physically at each visit.    Eat a variety of healthy foods. A dietitian can help you learn about nutrition. You need to eat enough calories to prevent weight loss caused by HIV. You also need protein and iron to prevent anemia, and calcium to prevent bone loss. Never eat raw eggs, unpasteurized foods, undercooked meat, or anything else that could cause food poisoning.  Healthy Foods      Stay active. Most people with HIV can safely exercise for at least 20 minutes, 3 times a week. Regular exercise can strengthen your heart and help prevent depression. Ask your provider about the best exercise plan for you.  Walking for Exercise      Do not smoke. If you currently smoke, ask for information on how to stop. HIV and medicines to treat HIV can increase your risk for heart disease. Nicotine increases the risk even higher. Do not use e-cigarettes or smokeless tobacco products instead of cigarettes. They still contain nicotine.    Find support in your community. Ask your provider to help you find counseling and other mental health services. You may want to join a support group for people with HIV.  Prevent the spread of HIV through body fluid:    Have safe sex. Tell your sex partners that you are HIV-positive. Use a latex condom correctly each time you have vaginal, anal, or oral sex. If you are female, you may use a latex female condom when a male condom cannot be used. Do not share sex toys.    Tell healthcare providers you are HIV-positive. Include all providers, such as your doctor, dentist, and anyone taking a blood sample.    Be careful with body fluids. Do not let your body fluids get near the mouth, eyes, anus, or open skin cuts of others. Do not let anyone who is not wearing gloves touch your sores, cuts, blood, or body fluids.    Do not donate blood, tissues, or sperm. You may be able to donate an organ to another person who has HIV. Your provider can give you more information on donation guidelines.    Do not share needles or other injectable drug equipment. Use a needle exchange program to get clean needles. Also do not share syringes, rinse water, or anything else used to prepare drugs for injection. Ask your provider for information if you need help to stop using illegal drugs.    Do not share objects or tools. Examples include razors, toothbrushes, or tweezers. They may cut or scrape the skin and cause others to come into contact with blood.    Do not beach your ears, navel, or any other place on your body. Piercing can cause bleeding. This may spread HIV.  Other ways to prevent the spread of HIV:    Take every dose of HAART medicines exactly as directed. This will prevent the virus from mutating and becoming much harder to treat. Consistent use of HAART medicines may help prevent the spread of HIV to a sex partner or an unborn baby.    Join a risk reduction program. Ask your healthcare provider or local health department to help you find a risk reduction program. This program will teach you how to tell others that you have HIV and ask sex partners to use condoms.    Treat STIs right away. If you are sexually active, get tested for STIs at least 1 time each year. If you become infected with an STI, treat it right away. This may help reduce the risk that you will spread HIV to a sex partner.  What you need to know if you are female and have HIV:    HIV increases the risk for cancer of the cervix, anus, vulva, and vagina. Follow up with your healthcare provider and get a Pap smear as directed. Pap smears check for signs of cervical cancer.    Ask your provider which birth control method is best for you. Condoms are the best way to prevent passing HIV to a sex partner. In addition to condoms, use a second form of birth control to prevent pregnancy. Do not use vaginal spermicides, because they may increase the risk that you will spread HIV.    Tell your provider if you are pregnant or want to become pregnant. Treatment can lower the risk that you will spread HIV to your baby. Attend all prenatal visits. Follow your provider's advice for a healthy outcome for you and your baby. Talk to your provider about breastfeeding.  Prevent the spread of germs:      Wash your hands often. Wash your hands several times each day. Wash after you use the bathroom, change a child's diaper, and before you prepare or eat food. Use soap and water every time. Rub your soapy hands together, lacing your fingers. Wash the front and back of your hands, and in between your fingers. Use the fingers of one hand to scrub under the fingernails of the other hand. Wash for at least 20 seconds. Rinse with warm, running water for several seconds. Then dry your hands with a clean towel or paper towel. Use hand  that contains alcohol if soap and water are not available. Do not touch your eyes, nose, or mouth without washing your hands first.  Handwashing      Cover a sneeze or cough. Use a tissue that covers your mouth and nose. Throw the tissue away in a trash can right away. Use the bend of your arm if a tissue is not available. Wash your hands well with soap and water or use a hand .    Avoid others when you have active signs or symptoms. Stay home as much as possible. Avoid crowds if you must go out. Do not have close contact with someone who is sick.    Ask about vaccines you may need. Talk to your healthcare provider about your vaccine history. Your provider will tell you which vaccines you need, and when to get them. Vaccines can help keep your immune system healthy by preventing infections.  Get the influenza (flu) vaccine as soon as recommended each year. The flu vaccine is available starting in September or October. Flu viruses change, so it is important to get a flu vaccine every year.    Get a COVID-19 vaccine as directed. At least 1 dose of an updated vaccine is recommended for everyone 6 months or older. COVID-19 vaccines are given as a shot in 1 to 3 doses, depending on the age of the person who receives it. COVID-19 vaccines are updated throughout the year. Your healthcare provider can help you schedule all needed doses as updated vaccines become available.    Get the pneumonia vaccine if recommended. This vaccine is usually recommended every 5 years. Your provider will tell you when to get this vaccine, if needed.  Follow up with your doctor every 3 or 4 months or as directed: You will need to return regularly for blood tests to measure CD4 cell and viral load counts. Your healthcare provider may do tests if your ARV medicines are not working as expected. You may need other tests on a regular basis. Write down your questions so you remember to ask them during your visits.    © [employer] 1973, 2025 Shortness of Breath, Adult  Shortness of breath is when a person has trouble breathing or when a person feels like she or he is having trouble breathing in enough air. Shortness of breath could be a sign of a medical problem.    Follow these instructions at home:  A sign showing that a person should not smoke.  Pollutants    Do not use any products that contain nicotine or tobacco. These products include cigarettes, chewing tobacco, and vaping devices, such as e-cigarettes. This also includes cigars and pipes. If you need help quitting, ask your health care provider.  Avoid things that can irritate your airways, including:  Smoke. This includes campfire smoke, forest fire smoke, and secondhand smoke from tobacco products. Do not smoke or allow others to smoke in your home.  Mold.  Dust.  Air pollution.  Chemical fumes.  Things that can give you an allergic reaction (allergens) if you have allergies. Common allergens include pollen from grasses or trees and animal dander.  Keep your living space clean and free of mold and dust.  General instructions    Pay attention to any changes in your symptoms.  Take over-the-counter and prescription medicines only as told by your health care provider. This includes oxygen therapy and inhaled medicines.  Rest as needed.  Return to your normal activities as told by your health care provider. Ask your health care provider what activities are safe for you.  Keep all follow-up visits. This is important.  Contact a health care provider if:  Your condition does not improve as soon as expected.  You have a hard time doing your normal activities, even after you rest.  You have new symptoms.  You cannot walk up stairs or exercise the way that you normally do.  Get help right away if:  Your shortness of breath gets worse.  You have shortness of breath when you are resting.  You feel light-headed or you faint.  You have a cough that is not controlled with medicines.  You cough up blood.  You have pain with breathing.  You have pain in your chest, arms, shoulders, or abdomen.  You have a fever.  These symptoms may be an emergency. Get help right away. Call 911.  Do not wait to see if the symptoms will go away.  Do not drive yourself to the hospital.  Summary  Shortness of breath is when a person has trouble breathing enough air. It can be a sign of a medical problem.  Avoid things that irritate your lungs, such as smoking, pollution, mold, and dust.  Pay attention to changes in your symptoms and contact your health care provider if you have a hard time completing daily activities because of shortness of breath.  This information is not intended to replace advice given to you by your health care provider. Make sure you discuss any questions you have with your health care provider.

## 2025-06-09 NOTE — ED ADULT NURSE NOTE - OBJECTIVE STATEMENT
34 y/o female presents to the ED c/o intermittent SOB x3 days. pmhx crohs's & anemia. Currently Denies CP, SOB, HA, F/C, N/V/D, weakness, dizziness, and any other complaints at this time. On exam A&Ox4, RA, ambulatory, NAD. Speaking in clear coherent sentences, respirations are spontaneous and unlabored. No obvious signs of injury, WOLF.

## 2025-06-12 ENCOUNTER — APPOINTMENT (OUTPATIENT)
Dept: ULTRASOUND IMAGING | Facility: CLINIC | Age: 35
End: 2025-06-12

## 2025-06-12 DIAGNOSIS — R07.89 OTHER CHEST PAIN: ICD-10-CM

## 2025-06-12 DIAGNOSIS — K50.90 CROHN'S DISEASE, UNSPECIFIED, WITHOUT COMPLICATIONS: ICD-10-CM

## 2025-06-12 DIAGNOSIS — R06.02 SHORTNESS OF BREATH: ICD-10-CM

## 2025-06-12 DIAGNOSIS — Z91.018 ALLERGY TO OTHER FOODS: ICD-10-CM

## 2025-06-26 ENCOUNTER — APPOINTMENT (OUTPATIENT)
Dept: GASTROENTEROLOGY | Facility: CLINIC | Age: 35
End: 2025-06-26

## 2025-06-26 VITALS
HEIGHT: 67 IN | BODY MASS INDEX: 24.8 KG/M2 | HEART RATE: 77 BPM | RESPIRATION RATE: 15 BRPM | TEMPERATURE: 97.2 F | SYSTOLIC BLOOD PRESSURE: 110 MMHG | WEIGHT: 158 LBS | DIASTOLIC BLOOD PRESSURE: 60 MMHG

## 2025-06-26 PROCEDURE — 99499A: CUSTOM | Mod: NC

## 2025-07-14 ENCOUNTER — APPOINTMENT (OUTPATIENT)
Dept: HEMATOLOGY ONCOLOGY | Facility: CLINIC | Age: 35
End: 2025-07-14
Payer: COMMERCIAL

## 2025-07-14 VITALS
HEIGHT: 67 IN | TEMPERATURE: 96.1 F | OXYGEN SATURATION: 99 % | WEIGHT: 160 LBS | SYSTOLIC BLOOD PRESSURE: 110 MMHG | BODY MASS INDEX: 25.11 KG/M2 | HEART RATE: 90 BPM | DIASTOLIC BLOOD PRESSURE: 68 MMHG

## 2025-07-14 PROBLEM — U07.1 COVID-19 VIRUS INFECTION: Status: RESOLVED | Noted: 2025-07-14 | Resolved: 2025-07-14

## 2025-07-14 PROBLEM — D50.9 IRON DEFICIENCY ANEMIA: Status: ACTIVE | Noted: 2025-07-14

## 2025-07-14 PROBLEM — D75.838 REACTIVE THROMBOCYTOSIS: Status: ACTIVE | Noted: 2025-07-14

## 2025-07-14 PROCEDURE — 36415 COLL VENOUS BLD VENIPUNCTURE: CPT

## 2025-07-14 PROCEDURE — 99204 OFFICE O/P NEW MOD 45 MIN: CPT

## 2025-07-15 LAB
ALBUMIN SERPL ELPH-MCNC: 3.5 G/DL
ALP BLD-CCNC: 100 U/L
ALT SERPL-CCNC: 13 U/L
ANION GAP SERPL CALC-SCNC: 13 MMOL/L
ANISOCYTOSIS BLD QL: SLIGHT
AST SERPL-CCNC: 18 U/L
AUTO BASOPHILS #: 0.05 K/UL
AUTO BASOPHILS %: 0.5 %
AUTO EOSINOPHILS #: 0.29 K/UL
AUTO EOSINOPHILS %: 2.9 %
AUTO IMMATURE GRANULOCYTES #: 0.08 K/UL
AUTO LYMPHOCYTES #: 2.48 K/UL
AUTO LYMPHOCYTES %: 25.1 %
AUTO MONOCYTES #: 1.07 K/UL
AUTO MONOCYTES %: 10.8 %
AUTO NEUTROPHILS #: 5.9 K/UL
AUTO NEUTROPHILS %: 59.9 %
AUTO NRBC #: 0 K/UL
BILIRUB SERPL-MCNC: 0.3 MG/DL
BUN SERPL-MCNC: 7 MG/DL
BURR CELLS BLD QL SMEAR: SLIGHT
CALCIUM SERPL-MCNC: 9.2 MG/DL
CHLORIDE SERPL-SCNC: 100 MMOL/L
CO2 SERPL-SCNC: 23 MMOL/L
CREAT SERPL-MCNC: 0.75 MG/DL
EGFRCR SERPLBLD CKD-EPI 2021: 105 ML/MIN/1.73M2
ERYTHROCYTE [SEDIMENTATION RATE] IN BLOOD BY WESTERGREN METHOD: 59 MM/HR
FERRITIN SERPL-MCNC: 40 NG/ML
FOLATE SERPL-MCNC: 13.7 NG/ML
GIANT PLATELETS BLD QL SMEAR: PRESENT
GLUCOSE SERPL-MCNC: 103 MG/DL
HAPTOGLOB SERPL-MCNC: 335 MG/DL
HCT VFR BLD CALC: 34.8 %
HGB BLD-MCNC: 10.7 G/DL
HYPOCHROMIA BLD QL SMEAR: SLIGHT
IMM GRANULOCYTES NFR BLD AUTO: 0.8 %
IMMATURE RETICULOCYTE FRACTION %: 20.1 %
IRON SATN MFR SERPL: 5 %
IRON SERPL-MCNC: 13 UG/DL
LDH SERPL-CCNC: 145 U/L
MAN DIFF?: NORMAL
MANUAL BASOPHILS #: 0.09 K/UL
MANUAL BASOPHILS %: 0.9 %
MANUAL EOSINOPHILS #: 0.51 K/UL
MANUAL EOSINOPHILS %: 5.2 %
MANUAL LYMPHOCYTES #: 1.95 K/UL
MANUAL LYMPHOCYTES %: 19.8 %
MANUAL MONOCYTES #: 1.27 K/UL
MANUAL MONOCYTES %: 12.9 %
MANUAL NEUTROPHILS #: 6.04 K/UL
MANUAL NEUTROPHILS %: 61.2 %
MCHC RBC-ENTMCNC: 25.9 PG
MCHC RBC-ENTMCNC: 30.7 G/DL
MCV RBC AUTO: 84.3 FL
MICROCYTES BLD QL SMEAR: SLIGHT
OVALOCYTES BLD QL SMEAR: SLIGHT
PLAT MORPH BLD: ABNORMAL
PLATELET # BLD AUTO: 526 K/UL
PLATELET ESTIMATE: ABNORMAL
PMV BLD AUTO: 0 /100 WBCS
PMV BLD: 9.3 FL
POIKILOCYTOSIS BLD QL SMEAR: SLIGHT
POLYCHROMASIA BLD QL SMEAR: SLIGHT
POTASSIUM SERPL-SCNC: 4.2 MMOL/L
PROT SERPL-MCNC: 7.7 G/DL
RBC # BLD: 4.13 M/UL
RBC # BLD: 4.13 M/UL
RBC # FLD: 14.6 %
RBC BLD AUTO: ABNORMAL
RETICS # AUTO: 1.4 %
RETICS AGGREG/RBC NFR: 55.8 K/UL
RETICULOCYTE HEMOGLOBIN EQUIVALENT: 26.5 PG
SMUDGE CELLS # BLD: PRESENT
SODIUM SERPL-SCNC: 135 MMOL/L
SPHEROCYTES BLD QL SMEAR: SLIGHT
TIBC SERPL-MCNC: 268 UG/DL
UIBC SERPL-MCNC: 255 UG/DL
VIT B12 SERPL-MCNC: 695 PG/ML
WBC # FLD AUTO: 9.87 K/UL

## 2025-07-22 ENCOUNTER — APPOINTMENT (OUTPATIENT)
Dept: GASTROENTEROLOGY | Facility: CLINIC | Age: 35
End: 2025-07-22

## 2025-07-24 ENCOUNTER — APPOINTMENT (OUTPATIENT)
Dept: GASTROENTEROLOGY | Facility: CLINIC | Age: 35
End: 2025-07-24
Payer: COMMERCIAL

## 2025-07-24 VITALS
SYSTOLIC BLOOD PRESSURE: 103 MMHG | DIASTOLIC BLOOD PRESSURE: 70 MMHG | TEMPERATURE: 96.4 F | WEIGHT: 157 LBS | RESPIRATION RATE: 15 BRPM | HEART RATE: 71 BPM | HEIGHT: 67 IN | BODY MASS INDEX: 24.64 KG/M2

## 2025-07-24 DIAGNOSIS — K50.90 CROHN'S DISEASE, UNSPECIFIED, W/OUT COMPLICATIONS: ICD-10-CM

## 2025-07-24 PROCEDURE — 99499A: CUSTOM | Mod: NC

## 2025-08-18 ENCOUNTER — APPOINTMENT (OUTPATIENT)
Dept: GASTROENTEROLOGY | Facility: CLINIC | Age: 35
End: 2025-08-18

## 2025-08-18 VITALS
WEIGHT: 154 LBS | SYSTOLIC BLOOD PRESSURE: 113 MMHG | TEMPERATURE: 96.3 F | RESPIRATION RATE: 16 BRPM | HEIGHT: 67 IN | OXYGEN SATURATION: 96 % | BODY MASS INDEX: 24.17 KG/M2 | HEART RATE: 95 BPM | DIASTOLIC BLOOD PRESSURE: 60 MMHG

## 2025-08-20 ENCOUNTER — NON-APPOINTMENT (OUTPATIENT)
Age: 35
End: 2025-08-20

## 2025-08-26 ENCOUNTER — OUTPATIENT (OUTPATIENT)
Dept: OUTPATIENT SERVICES | Facility: HOSPITAL | Age: 35
LOS: 1 days | End: 2025-08-26
Payer: COMMERCIAL

## 2025-08-26 ENCOUNTER — APPOINTMENT (OUTPATIENT)
Dept: INFUSION THERAPY | Facility: CLINIC | Age: 35
End: 2025-08-26

## 2025-08-26 VITALS
RESPIRATION RATE: 18 BRPM | DIASTOLIC BLOOD PRESSURE: 67 MMHG | OXYGEN SATURATION: 97 % | TEMPERATURE: 98 F | HEART RATE: 97 BPM | SYSTOLIC BLOOD PRESSURE: 103 MMHG

## 2025-08-26 VITALS
OXYGEN SATURATION: 95 % | RESPIRATION RATE: 18 BRPM | DIASTOLIC BLOOD PRESSURE: 60 MMHG | TEMPERATURE: 98 F | SYSTOLIC BLOOD PRESSURE: 101 MMHG | HEART RATE: 100 BPM | HEIGHT: 67 IN | WEIGHT: 151.9 LBS

## 2025-08-26 DIAGNOSIS — D50.9 IRON DEFICIENCY ANEMIA, UNSPECIFIED: ICD-10-CM

## 2025-08-26 PROCEDURE — 96365 THER/PROPH/DIAG IV INF INIT: CPT

## 2025-08-26 RX ORDER — IRON SUCROSE 20 MG/ML
200 INJECTION, SOLUTION INTRAVENOUS ONCE
Refills: 0 | Status: COMPLETED | OUTPATIENT
Start: 2025-08-26 | End: 2025-08-26

## 2025-08-26 RX ADMIN — IRON SUCROSE 200 MILLIGRAM(S): 20 INJECTION, SOLUTION INTRAVENOUS at 13:00

## 2025-08-26 RX ADMIN — IRON SUCROSE 220 MILLIGRAM(S): 20 INJECTION, SOLUTION INTRAVENOUS at 12:24

## 2025-09-03 ENCOUNTER — NON-APPOINTMENT (OUTPATIENT)
Age: 35
End: 2025-09-03

## 2025-09-04 ENCOUNTER — OUTPATIENT (OUTPATIENT)
Dept: OUTPATIENT SERVICES | Facility: HOSPITAL | Age: 35
LOS: 1 days | End: 2025-09-04
Payer: COMMERCIAL

## 2025-09-04 ENCOUNTER — APPOINTMENT (OUTPATIENT)
Dept: INFUSION THERAPY | Facility: CLINIC | Age: 35
End: 2025-09-04

## 2025-09-04 VITALS
RESPIRATION RATE: 18 BRPM | HEIGHT: 67 IN | WEIGHT: 154.1 LBS | DIASTOLIC BLOOD PRESSURE: 71 MMHG | HEART RATE: 100 BPM | OXYGEN SATURATION: 100 % | SYSTOLIC BLOOD PRESSURE: 108 MMHG | TEMPERATURE: 98 F

## 2025-09-04 DIAGNOSIS — D50.9 IRON DEFICIENCY ANEMIA, UNSPECIFIED: ICD-10-CM

## 2025-09-04 PROCEDURE — 96365 THER/PROPH/DIAG IV INF INIT: CPT

## 2025-09-04 RX ORDER — IRON SUCROSE 20 MG/ML
200 INJECTION, SOLUTION INTRAVENOUS ONCE
Refills: 0 | Status: COMPLETED | OUTPATIENT
Start: 2025-09-04 | End: 2025-09-04

## 2025-09-04 RX ADMIN — IRON SUCROSE 200 MILLIGRAM(S): 20 INJECTION, SOLUTION INTRAVENOUS at 11:00

## 2025-09-04 RX ADMIN — IRON SUCROSE 220 MILLIGRAM(S): 20 INJECTION, SOLUTION INTRAVENOUS at 10:25

## 2025-09-12 ENCOUNTER — APPOINTMENT (OUTPATIENT)
Dept: INFUSION THERAPY | Facility: CLINIC | Age: 35
End: 2025-09-12

## 2025-09-15 ENCOUNTER — APPOINTMENT (OUTPATIENT)
Dept: GASTROENTEROLOGY | Facility: CLINIC | Age: 35
End: 2025-09-15
Payer: COMMERCIAL

## 2025-09-15 VITALS
TEMPERATURE: 97.9 F | WEIGHT: 155.4 LBS | BODY MASS INDEX: 24.39 KG/M2 | OXYGEN SATURATION: 99 % | RESPIRATION RATE: 15 BRPM | HEART RATE: 103 BPM | HEIGHT: 67 IN | SYSTOLIC BLOOD PRESSURE: 116 MMHG | DIASTOLIC BLOOD PRESSURE: 68 MMHG

## 2025-09-15 DIAGNOSIS — Z23 ENCOUNTER FOR IMMUNIZATION: ICD-10-CM

## 2025-09-15 DIAGNOSIS — K50.90 CROHN'S DISEASE, UNSPECIFIED, W/OUT COMPLICATIONS: ICD-10-CM

## 2025-09-15 PROCEDURE — 99499A: CUSTOM | Mod: NC

## 2025-09-18 ENCOUNTER — APPOINTMENT (OUTPATIENT)
Dept: INFUSION THERAPY | Facility: CLINIC | Age: 35
End: 2025-09-18

## 2025-09-19 LAB
ALBUMIN SERPL ELPH-MCNC: 3.2 G/DL
ALP BLD-CCNC: 86 U/L
ALT SERPL-CCNC: 12 U/L
ANION GAP SERPL CALC-SCNC: 12 MMOL/L
AST SERPL-CCNC: 22 U/L
BASOPHILS # BLD AUTO: 0.04 K/UL
BASOPHILS NFR BLD AUTO: 0.5 %
BILIRUB SERPL-MCNC: 0.2 MG/DL
BUN SERPL-MCNC: 7 MG/DL
CALCIUM SERPL-MCNC: 8.9 MG/DL
CHLORIDE SERPL-SCNC: 103 MMOL/L
CO2 SERPL-SCNC: 22 MMOL/L
CREAT SERPL-MCNC: 0.88 MG/DL
CRP SERPL-MCNC: 25 MG/L
EGFRCR SERPLBLD CKD-EPI 2021: 87 ML/MIN/1.73M2
EOSINOPHIL # BLD AUTO: 0.27 K/UL
EOSINOPHIL NFR BLD AUTO: 3.3 %
GLUCOSE SERPL-MCNC: 69 MG/DL
HCT VFR BLD CALC: 34.7 %
HGB BLD-MCNC: 10.4 G/DL
IMM GRANULOCYTES NFR BLD AUTO: 0.2 %
LYMPHOCYTES # BLD AUTO: 1.94 K/UL
LYMPHOCYTES NFR BLD AUTO: 24.1 %
MAN DIFF?: NORMAL
MCHC RBC-ENTMCNC: 26 PG
MCHC RBC-ENTMCNC: 30 G/DL
MCV RBC AUTO: 86.8 FL
MONOCYTES # BLD AUTO: 0.8 K/UL
MONOCYTES NFR BLD AUTO: 9.9 %
NEUTROPHILS # BLD AUTO: 4.99 K/UL
NEUTROPHILS NFR BLD AUTO: 62 %
PLATELET # BLD AUTO: 461 K/UL
POTASSIUM SERPL-SCNC: 4.5 MMOL/L
PROT SERPL-MCNC: 7.4 G/DL
RBC # BLD: 4 M/UL
RBC # FLD: 18.4 %
SODIUM SERPL-SCNC: 137 MMOL/L
WBC # FLD AUTO: 8.06 K/UL

## 2025-09-26 RX ORDER — PREDNISONE 10 MG/1
10 TABLET ORAL DAILY
Qty: 60 | Refills: 1 | Status: ACTIVE | COMMUNITY
Start: 2025-09-26 | End: 1900-01-01

## (undated) DEVICE — SYR LUER LOK 30CC

## (undated) DEVICE — LUBRICATING JELLY ONESHOT 1.25OZ

## (undated) DEVICE — ATTACHMENT DISTAL 4X13.4MM

## (undated) DEVICE — KIT ENDO PROCEDURE CUST W/VLV

## (undated) DEVICE — DVC INFLATION CRE STERIFLATE